# Patient Record
Sex: MALE | Race: WHITE | NOT HISPANIC OR LATINO | Employment: FULL TIME | URBAN - METROPOLITAN AREA
[De-identification: names, ages, dates, MRNs, and addresses within clinical notes are randomized per-mention and may not be internally consistent; named-entity substitution may affect disease eponyms.]

---

## 2017-01-03 ENCOUNTER — PATIENT MESSAGE (OUTPATIENT)
Dept: UROLOGY | Facility: CLINIC | Age: 47
End: 2017-01-03

## 2017-01-04 ENCOUNTER — PATIENT MESSAGE (OUTPATIENT)
Dept: UROLOGY | Facility: CLINIC | Age: 47
End: 2017-01-04

## 2017-01-04 ENCOUNTER — TELEPHONE (OUTPATIENT)
Dept: UROLOGY | Facility: CLINIC | Age: 47
End: 2017-01-04

## 2017-01-10 ENCOUNTER — CLINICAL SUPPORT (OUTPATIENT)
Dept: UROLOGY | Facility: CLINIC | Age: 47
End: 2017-01-10
Payer: COMMERCIAL

## 2017-01-10 ENCOUNTER — TELEPHONE (OUTPATIENT)
Dept: UROLOGY | Facility: CLINIC | Age: 47
End: 2017-01-10

## 2017-01-10 DIAGNOSIS — Z30.2 ENCOUNTER FOR STERILIZATION: Primary | ICD-10-CM

## 2017-01-10 NOTE — PROGRESS NOTES
Dr Tomlin reviewed the pt's semen specimen under the microscope and saw no sperm. Pt is still sterile. Pt will be informed.

## 2017-01-13 ENCOUNTER — OFFICE VISIT (OUTPATIENT)
Dept: OPTOMETRY | Facility: CLINIC | Age: 47
End: 2017-01-13
Payer: COMMERCIAL

## 2017-01-13 DIAGNOSIS — Z01.00 EXAMINATION OF EYES AND VISION: Primary | ICD-10-CM

## 2017-01-13 DIAGNOSIS — H52.7 REFRACTIVE ERROR: ICD-10-CM

## 2017-01-13 PROCEDURE — 99999 PR PBB SHADOW E&M-EST. PATIENT-LVL II: CPT | Mod: PBBFAC,,, | Performed by: OPTOMETRIST

## 2017-01-13 PROCEDURE — 92015 DETERMINE REFRACTIVE STATE: CPT | Mod: S$GLB,,, | Performed by: OPTOMETRIST

## 2017-01-13 PROCEDURE — 92014 COMPRE OPH EXAM EST PT 1/>: CPT | Mod: S$GLB,,, | Performed by: OPTOMETRIST

## 2017-01-13 NOTE — PROGRESS NOTES
HPI     CC: Pt here today yearly eye exam. Pt states over the last year reading   has gotten blurry. Pt has tried using OTC readers but does not see clear   and sharp with the OTC readers.     (-) pain / (-) discomfort  (-) headaches  (-) diplopia   (-) flashes / (-) floaters         Last edited by Heriberto Flores on 1/13/2017  8:05 AM.     ROS     Positive for: Eyes    Negative for: Constitutional, Gastrointestinal, Neurological, Skin,   Genitourinary, Musculoskeletal, HENT, Endocrine, Cardiovascular,   Respiratory, Psychiatric, Allergic/Imm, Heme/Lymph    Last edited by Brenton Alejo, OD on 1/13/2017  8:30 AM. (History)        Assessment /Plan     For exam results, see Encounter Report.    Examination of eyes and vision    Refractive error      Good ocular health OU. Dispensed updated spectacle Rx. Discussed various spectacle lens options, recommend PALs. Discussed adaptation period to new specs.     Discussed good CLs candidacy, will return as desired for first time fit.       RTC in 1 year for comprehensive eye exam, or sooner prn.

## 2017-01-17 ENCOUNTER — PATIENT MESSAGE (OUTPATIENT)
Dept: FAMILY MEDICINE | Facility: CLINIC | Age: 47
End: 2017-01-17

## 2017-01-20 NOTE — TELEPHONE ENCOUNTER
Talked with patient; acute issues were discussed at visit is why additional coding than physical. This is why there is copay. He is not happy, but voices understanding

## 2017-10-12 ENCOUNTER — TELEPHONE (OUTPATIENT)
Dept: NEUROSURGERY | Facility: CLINIC | Age: 47
End: 2017-10-12

## 2017-10-12 NOTE — TELEPHONE ENCOUNTER
Called pt to schedule appt with CATHY Rascon. Date, time, and location confirmed. Pt verbalized understanding.    ----- Message from Catherine Hylton sent at 10/12/2017 10:41 AM CDT -----  Contact: Self  Calling to schedule an appointment for lower back pain and is radiating down both legs.  Please call patient to schedule.

## 2017-11-03 ENCOUNTER — TELEPHONE (OUTPATIENT)
Dept: NEUROSURGERY | Facility: CLINIC | Age: 47
End: 2017-11-03

## 2017-11-29 DIAGNOSIS — M54.50 LOW BACK PAIN RADIATING TO LOWER EXTREMITY: Primary | ICD-10-CM

## 2017-11-29 DIAGNOSIS — M79.606 LOW BACK PAIN RADIATING TO LOWER EXTREMITY: Primary | ICD-10-CM

## 2017-12-05 ENCOUNTER — HOSPITAL ENCOUNTER (OUTPATIENT)
Dept: RADIOLOGY | Facility: HOSPITAL | Age: 47
Discharge: HOME | End: 2017-12-05
Attending: NURSE PRACTITIONER
Payer: COMMERCIAL

## 2017-12-05 DIAGNOSIS — M54.50 LOW BACK PAIN RADIATING TO LOWER EXTREMITY: ICD-10-CM

## 2017-12-05 DIAGNOSIS — M79.606 LOW BACK PAIN RADIATING TO LOWER EXTREMITY: ICD-10-CM

## 2017-12-07 ENCOUNTER — HOSPITAL ENCOUNTER (OUTPATIENT)
Dept: RADIOLOGY | Facility: HOSPITAL | Age: 47
Discharge: HOME | End: 2017-12-07
Attending: NURSE PRACTITIONER
Payer: COMMERCIAL

## 2017-12-07 PROCEDURE — 72148 MRI LUMBAR SPINE W/O DYE: CPT | Mod: TC

## 2017-12-07 PROCEDURE — 72148 MRI LUMBAR SPINE W/O DYE: CPT | Mod: 26,,, | Performed by: RADIOLOGY

## 2017-12-12 ENCOUNTER — TELEPHONE (OUTPATIENT)
Dept: NEUROSURGERY | Facility: CLINIC | Age: 47
End: 2017-12-12

## 2017-12-12 NOTE — TELEPHONE ENCOUNTER
Left voice message with pt regarding request to make appt. Awaiting response. Can see ANDRÉS Randle, unless he can get a referral from MD then we can schedule with Dr. Smith. He has MRI.

## 2017-12-12 NOTE — TELEPHONE ENCOUNTER
Pt called back and stated he will have his MD send a referral to see Dr. Smith. Will call pt once referral is sent.

## 2017-12-14 ENCOUNTER — OFFICE VISIT (OUTPATIENT)
Dept: PAIN MEDICINE | Facility: CLINIC | Age: 47
End: 2017-12-14
Payer: COMMERCIAL

## 2017-12-14 VITALS
HEART RATE: 72 BPM | BODY MASS INDEX: 24.52 KG/M2 | HEIGHT: 73 IN | WEIGHT: 185 LBS | SYSTOLIC BLOOD PRESSURE: 122 MMHG | DIASTOLIC BLOOD PRESSURE: 70 MMHG

## 2017-12-14 DIAGNOSIS — M51.26 LUMBAR DISC HERNIATION: Primary | ICD-10-CM

## 2017-12-14 DIAGNOSIS — M54.16 LUMBAR RADICULITIS: ICD-10-CM

## 2017-12-14 DIAGNOSIS — M54.16 LUMBAR RADICULOPATHY: Primary | ICD-10-CM

## 2017-12-14 PROCEDURE — 99999 PR PBB SHADOW E&M-EST. PATIENT-LVL III: CPT | Mod: PBBFAC,,, | Performed by: ANESTHESIOLOGY

## 2017-12-14 PROCEDURE — 99204 OFFICE O/P NEW MOD 45 MIN: CPT | Mod: S$GLB,,, | Performed by: ANESTHESIOLOGY

## 2017-12-14 NOTE — PROGRESS NOTES
This note was completed with dictation software and grammatical errors may exist.    Referring Physician: No ref. provider found    PCP: Gregory Parmar MD      CC: low back and leg pain    HPI:   Valentin Johnson is a 47 y.o. male presents to us with low back and bilateral leg pain.  Pain has gradually worsened over past 4 months.  No recent traumatic incident.  His constant aching, burning, grabbing pain in his lower back.  Pain radiates down his bilateral legs, right greater than left.  Pain travels down to his right calf.  Pain worsens with standing, bending, lateral rotation.  Pain improves with rest and walking.  He has tried chiropractor therapy with minimal benefit.  MRI of his lumbar spine performed recently shows L5-S1 disc extrusion.  He denies any weakness.  No bowel bladder changes.  He has not had any interventional procedures.  He rates pain 6/10 today.    ROS:  CONSTITUTIONAL: No fevers, chills, night sweats, wt. loss, appetite changes  SKIN: no rashes or itching  ENT: No headaches, head trauma, vision changes, or eye pain  LYMPH NODES: None noticed   CV: No chest pain, palpitations.   RESP: No shortness of breath, dyspnea on exertion, cough, wheezing, or hemoptysis  GI: No nausea, emesis, diarrhea, constipation, melena, hematochezia, pain.    : No dysuria, hematuria, urgency, or frequency   HEME: No easy bruising, bleeding problems  PSYCHIATRIC: No depression, anxiety, psychosis, hallucinations.  NEURO: No seizures, memory loss, dizziness or difficulty sleeping  MSK:  +history of present illness      Past Medical History:   Diagnosis Date    Colon polyp     GERD (gastroesophageal reflux disease)     Giardial enteritis     H. pylori infection     Prostatitis      Past Surgical History:   Procedure Laterality Date    COLONOSCOPY      COLONOSCOPY N/A 12/16/2015    Procedure: COLONOSCOPY;  Surgeon: Anton Matos MD;  Location: [facility];  Service: Endoscopy;  Laterality: N/A;    HERNIA  "REPAIR Right     inguinal    NASAL SEPTUM SURGERY      UPPER GASTROINTESTINAL ENDOSCOPY       Family History   Problem Relation Age of Onset    Hypertension Mother     Hypertension Father     Macular degeneration Father     Migraines Sister     Hypertension Brother     Obesity Brother     Macular degeneration Maternal Grandmother     Glaucoma Neg Hx     Retinal detachment Neg Hx     Strabismus Neg Hx      Social History     Social History    Marital status:      Spouse name: N/A    Number of children: N/A    Years of education: N/A     Occupational History    sports medicine      Social History Main Topics    Smoking status: Never Smoker    Smokeless tobacco: Never Used    Alcohol use Yes      Comment: seldom    Drug use: No    Sexual activity: Not Asked     Other Topics Concern    None     Social History Narrative    None         Medications/Allergies: See med card    Vitals:    12/14/17 0955   BP: 122/70   Pulse: 72   Weight: 83.9 kg (185 lb)   Height: 6' 1" (1.854 m)   PainSc:   4   PainLoc: Back         Physical exam:    GENERAL: A and O x3, the patient appears well groomed and is in no acute distress.  Skin: No rashes or obvious lesions  HEENT: normocephalic, atraumatic  CARDIOVASCULAR:  Palpable peripheral pulses  LUNGS: easy work of breathing  ABDOMEN: soft, nontender   UPPER EXTREMITIES: Normal alignment, normal range of motion, no atrophy, no skin changes,  hair growth and nail growth normal and equal bilaterally. No swelling, no tenderness.    LOWER EXTREMITIES:  Normal alignment, normal range of motion, no atrophy, no skin changes,  hair growth and nail growth normal and equal bilaterally. No swelling, no tenderness.    LUMBAR SPINE  Lumbar spine: ROM is full with flexion extension and oblique extension with moderate increased pain.    Yo's test causes no increased pain on either side.    Supine straight leg raise is positive on the right at 45 degrees  Internal and " external rotation of the hip causes no increased pain on either side.  Myofascial exam: No tenderness to palpation across lumbar paraspinous muscles.      MENTAL STATUS: normal orientation, speech, language, and fund of knowledge for social situation.  Emotional state appropriate.    CRANIAL NERVES:  II:  PERRL bilaterally,   III,IV,VI: EOMI.    V:  Facial sensation equal bilaterally  VII:  Facial motor function normal.  VIII:  Hearing equal to finger rub bilaterally  IX/X: Gag normal, palate symmetric  XI:  Shoulder shrug equal, head turn equal  XII:  Tongue midline without fasciculations      MOTOR: Tone and bulk: normal bilateral upper and lower Strength: normal   Delt Bi Tri WE WF     R 5 5 5 5 5 5   L 5 5 5 5 5 5     IP ADD ABD Quad TA Gas HAM  R 5 5 5 5 5 5 5  L 5 5 5 5 5 5 5    SENSATION: Light touch and pinprick intact bilaterally  REFLEXES: normal, symmetric, nonbrisk.  Toes down, no clonus. No hoffmans.  GAIT: normal rise, base, steps, and arm swing.        Imaging:  MRI L-spine 12/7/2017  Conus terminates at L1. Within the lateral recess at the L5-S1 level and extending cephalad along the posterior aspect of the L5 vertebral body, there is an oval, circumscribed T1/T2 isointense extra dural mass measuring 1.4 x 0.8 x 1.0 cm (craniocaudal by AP by transverse). This abuts the superior margin of the L5-S1 disc. There is moderate broad-based posterior bulging of L5-S1 disc and the findings may represent migrated disc material in the setting of a disc extrusion.    L1-L2:  No disc herniation. No spinal canal or neuroforaminal narrowing.    L2-L3:  Mild generalized disc bulging and facet arthropathy are present resulting in mild left neuroforaminal narrowing.    L3-L4:  Bilateral facet arthropathy contributes to mild bilateral neuroforaminal narrowing.    L4-L5:  No disc herniation. Moderate bilateral facet arthropathy is present, contributing to mild bilateral neuroforaminal narrowing.    L5-S1:  Bilateral  L5 pars interarticularis defects are present without associated subluxation. The above described moderate broad-based posterior disc bulge along with the right lateral recess/right paracentral extradural mass are clear there is also a 1.2 x 1.2 x 0.5 cm extradural cystic lesion within the left lateral aspect of the spinal canal, abutting the left facet joint, compatible with a synovial cyst. There is also moderate bilateral facet arthropathy. These findings contribute to moderate bilateral neuroforaminal narrowing and mild spinal canal narrowing.    Assessment:  Patient presents with low back and bilateral leg pain  1. Lumbar disc herniation    2. Lumbar radiculitis          Plan:  - I have stressed the importance of physical activity and exercise to improve overall health  - I think that the patient's back pain and radicular leg symptoms are due to disc extrusion and have recommended a lumbar epidural steroid injection to the Bilateral L5-S1 level.   - May consider neurosurgery referral if above is not helpful  - Follow up after procedure        Thank you for referring this interesting patient, and I look forward to continuing to collaborate in his care.

## 2017-12-19 ENCOUNTER — SURGERY (OUTPATIENT)
Age: 47
End: 2017-12-19

## 2017-12-19 ENCOUNTER — HOSPITAL ENCOUNTER (OUTPATIENT)
Facility: AMBULARY SURGERY CENTER | Age: 47
Discharge: HOME | End: 2017-12-19
Attending: ANESTHESIOLOGY | Admitting: ANESTHESIOLOGY
Payer: COMMERCIAL

## 2017-12-19 DIAGNOSIS — M54.16 LUMBAR RADICULITIS: Primary | ICD-10-CM

## 2017-12-19 PROCEDURE — 64483 NJX AA&/STRD TFRM EPI L/S 1: CPT | Mod: 50,,, | Performed by: ANESTHESIOLOGY

## 2017-12-19 PROCEDURE — 64483 NJX AA&/STRD TFRM EPI L/S 1: CPT | Mod: RT | Performed by: ANESTHESIOLOGY

## 2017-12-19 RX ORDER — LIDOCAINE HYDROCHLORIDE 10 MG/ML
INJECTION, SOLUTION EPIDURAL; INFILTRATION; INTRACAUDAL; PERINEURAL
Status: DISCONTINUED
Start: 2017-12-19 | End: 2017-12-19 | Stop reason: HOSPADM

## 2017-12-19 RX ORDER — LIDOCAINE HYDROCHLORIDE 10 MG/ML
INJECTION, SOLUTION EPIDURAL; INFILTRATION; INTRACAUDAL; PERINEURAL
Status: DISCONTINUED | OUTPATIENT
Start: 2017-12-19 | End: 2017-12-19 | Stop reason: HOSPADM

## 2017-12-19 RX ORDER — BUPIVACAINE HYDROCHLORIDE 2.5 MG/ML
INJECTION, SOLUTION EPIDURAL; INFILTRATION; INTRACAUDAL
Status: DISCONTINUED | OUTPATIENT
Start: 2017-12-19 | End: 2017-12-19 | Stop reason: HOSPADM

## 2017-12-19 RX ORDER — SODIUM CHLORIDE, SODIUM LACTATE, POTASSIUM CHLORIDE, CALCIUM CHLORIDE 600; 310; 30; 20 MG/100ML; MG/100ML; MG/100ML; MG/100ML
INJECTION, SOLUTION INTRAVENOUS ONCE AS NEEDED
Status: DISCONTINUED | OUTPATIENT
Start: 2017-12-19 | End: 2017-12-19 | Stop reason: HOSPADM

## 2017-12-19 RX ORDER — DEXAMETHASONE SODIUM PHOSPHATE 10 MG/ML
INJECTION INTRAMUSCULAR; INTRAVENOUS
Status: DISCONTINUED | OUTPATIENT
Start: 2017-12-19 | End: 2017-12-19 | Stop reason: HOSPADM

## 2017-12-19 RX ORDER — DEXAMETHASONE SODIUM PHOSPHATE 10 MG/ML
INJECTION INTRAMUSCULAR; INTRAVENOUS
Status: DISCONTINUED
Start: 2017-12-19 | End: 2017-12-19 | Stop reason: HOSPADM

## 2017-12-19 RX ORDER — BUPIVACAINE HYDROCHLORIDE 2.5 MG/ML
INJECTION, SOLUTION EPIDURAL; INFILTRATION; INTRACAUDAL
Status: DISCONTINUED
Start: 2017-12-19 | End: 2017-12-19 | Stop reason: HOSPADM

## 2017-12-19 RX ADMIN — LIDOCAINE HYDROCHLORIDE 5 ML: 10 INJECTION, SOLUTION EPIDURAL; INFILTRATION; INTRACAUDAL; PERINEURAL at 01:12

## 2017-12-19 RX ADMIN — DEXAMETHASONE SODIUM PHOSPHATE 10 MG: 10 INJECTION INTRAMUSCULAR; INTRAVENOUS at 01:12

## 2017-12-19 RX ADMIN — BUPIVACAINE HYDROCHLORIDE 3 ML: 2.5 INJECTION, SOLUTION EPIDURAL; INFILTRATION; INTRACAUDAL at 01:12

## 2017-12-19 NOTE — PLAN OF CARE
Patient denies he has pain, nausea or any weakness. Able to transfer to chair independently. States he is ready to drive self home.

## 2017-12-19 NOTE — H&P (VIEW-ONLY)
This note was completed with dictation software and grammatical errors may exist.    Referring Physician: No ref. provider found    PCP: Gregory Parmar MD      CC: low back and leg pain    HPI:   Valentin Johnson is a 47 y.o. male presents to us with low back and bilateral leg pain.  Pain has gradually worsened over past 4 months.  No recent traumatic incident.  His constant aching, burning, grabbing pain in his lower back.  Pain radiates down his bilateral legs, right greater than left.  Pain travels down to his right calf.  Pain worsens with standing, bending, lateral rotation.  Pain improves with rest and walking.  He has tried chiropractor therapy with minimal benefit.  MRI of his lumbar spine performed recently shows L5-S1 disc extrusion.  He denies any weakness.  No bowel bladder changes.  He has not had any interventional procedures.  He rates pain 6/10 today.    ROS:  CONSTITUTIONAL: No fevers, chills, night sweats, wt. loss, appetite changes  SKIN: no rashes or itching  ENT: No headaches, head trauma, vision changes, or eye pain  LYMPH NODES: None noticed   CV: No chest pain, palpitations.   RESP: No shortness of breath, dyspnea on exertion, cough, wheezing, or hemoptysis  GI: No nausea, emesis, diarrhea, constipation, melena, hematochezia, pain.    : No dysuria, hematuria, urgency, or frequency   HEME: No easy bruising, bleeding problems  PSYCHIATRIC: No depression, anxiety, psychosis, hallucinations.  NEURO: No seizures, memory loss, dizziness or difficulty sleeping  MSK:  +history of present illness      Past Medical History:   Diagnosis Date    Colon polyp     GERD (gastroesophageal reflux disease)     Giardial enteritis     H. pylori infection     Prostatitis      Past Surgical History:   Procedure Laterality Date    COLONOSCOPY      COLONOSCOPY N/A 12/16/2015    Procedure: COLONOSCOPY;  Surgeon: Anton Matos MD;  Location: [facility];  Service: Endoscopy;  Laterality: N/A;    HERNIA  "REPAIR Right     inguinal    NASAL SEPTUM SURGERY      UPPER GASTROINTESTINAL ENDOSCOPY       Family History   Problem Relation Age of Onset    Hypertension Mother     Hypertension Father     Macular degeneration Father     Migraines Sister     Hypertension Brother     Obesity Brother     Macular degeneration Maternal Grandmother     Glaucoma Neg Hx     Retinal detachment Neg Hx     Strabismus Neg Hx      Social History     Social History    Marital status:      Spouse name: N/A    Number of children: N/A    Years of education: N/A     Occupational History    sports medicine      Social History Main Topics    Smoking status: Never Smoker    Smokeless tobacco: Never Used    Alcohol use Yes      Comment: seldom    Drug use: No    Sexual activity: Not Asked     Other Topics Concern    None     Social History Narrative    None         Medications/Allergies: See med card    Vitals:    12/14/17 0955   BP: 122/70   Pulse: 72   Weight: 83.9 kg (185 lb)   Height: 6' 1" (1.854 m)   PainSc:   4   PainLoc: Back         Physical exam:    GENERAL: A and O x3, the patient appears well groomed and is in no acute distress.  Skin: No rashes or obvious lesions  HEENT: normocephalic, atraumatic  CARDIOVASCULAR:  Palpable peripheral pulses  LUNGS: easy work of breathing  ABDOMEN: soft, nontender   UPPER EXTREMITIES: Normal alignment, normal range of motion, no atrophy, no skin changes,  hair growth and nail growth normal and equal bilaterally. No swelling, no tenderness.    LOWER EXTREMITIES:  Normal alignment, normal range of motion, no atrophy, no skin changes,  hair growth and nail growth normal and equal bilaterally. No swelling, no tenderness.    LUMBAR SPINE  Lumbar spine: ROM is full with flexion extension and oblique extension with moderate increased pain.    Yo's test causes no increased pain on either side.    Supine straight leg raise is positive on the right at 45 degrees  Internal and " external rotation of the hip causes no increased pain on either side.  Myofascial exam: No tenderness to palpation across lumbar paraspinous muscles.      MENTAL STATUS: normal orientation, speech, language, and fund of knowledge for social situation.  Emotional state appropriate.    CRANIAL NERVES:  II:  PERRL bilaterally,   III,IV,VI: EOMI.    V:  Facial sensation equal bilaterally  VII:  Facial motor function normal.  VIII:  Hearing equal to finger rub bilaterally  IX/X: Gag normal, palate symmetric  XI:  Shoulder shrug equal, head turn equal  XII:  Tongue midline without fasciculations      MOTOR: Tone and bulk: normal bilateral upper and lower Strength: normal   Delt Bi Tri WE WF     R 5 5 5 5 5 5   L 5 5 5 5 5 5     IP ADD ABD Quad TA Gas HAM  R 5 5 5 5 5 5 5  L 5 5 5 5 5 5 5    SENSATION: Light touch and pinprick intact bilaterally  REFLEXES: normal, symmetric, nonbrisk.  Toes down, no clonus. No hoffmans.  GAIT: normal rise, base, steps, and arm swing.        Imaging:  MRI L-spine 12/7/2017  Conus terminates at L1. Within the lateral recess at the L5-S1 level and extending cephalad along the posterior aspect of the L5 vertebral body, there is an oval, circumscribed T1/T2 isointense extra dural mass measuring 1.4 x 0.8 x 1.0 cm (craniocaudal by AP by transverse). This abuts the superior margin of the L5-S1 disc. There is moderate broad-based posterior bulging of L5-S1 disc and the findings may represent migrated disc material in the setting of a disc extrusion.    L1-L2:  No disc herniation. No spinal canal or neuroforaminal narrowing.    L2-L3:  Mild generalized disc bulging and facet arthropathy are present resulting in mild left neuroforaminal narrowing.    L3-L4:  Bilateral facet arthropathy contributes to mild bilateral neuroforaminal narrowing.    L4-L5:  No disc herniation. Moderate bilateral facet arthropathy is present, contributing to mild bilateral neuroforaminal narrowing.    L5-S1:  Bilateral  L5 pars interarticularis defects are present without associated subluxation. The above described moderate broad-based posterior disc bulge along with the right lateral recess/right paracentral extradural mass are clear there is also a 1.2 x 1.2 x 0.5 cm extradural cystic lesion within the left lateral aspect of the spinal canal, abutting the left facet joint, compatible with a synovial cyst. There is also moderate bilateral facet arthropathy. These findings contribute to moderate bilateral neuroforaminal narrowing and mild spinal canal narrowing.    Assessment:  Patient presents with low back and bilateral leg pain  1. Lumbar disc herniation    2. Lumbar radiculitis          Plan:  - I have stressed the importance of physical activity and exercise to improve overall health  - I think that the patient's back pain and radicular leg symptoms are due to disc extrusion and have recommended a lumbar epidural steroid injection to the Bilateral L5-S1 level.   - May consider neurosurgery referral if above is not helpful  - Follow up after procedure        Thank you for referring this interesting patient, and I look forward to continuing to collaborate in his care.

## 2017-12-19 NOTE — DISCHARGE SUMMARY
[facility]  Discharge Note  Short Stay    Admit Date: 12/19/2017    Discharge Date and Time: 12/19/2017    Attending Physician: Servando Jo MD     Discharge Provider: Servando Jo    Diagnoses:  Active Hospital Problems    Diagnosis  POA    *Lumbar radiculitis [M54.16]  Yes      Resolved Hospital Problems    Diagnosis Date Resolved POA   No resolved problems to display.       Hospital Course: Lumbar MARIAMA  Discharged Condition: Good    Final Diagnoses:   Active Hospital Problems    Diagnosis  POA    *Lumbar radiculitis [M54.16]  Yes      Resolved Hospital Problems    Diagnosis Date Resolved POA   No resolved problems to display.       Disposition: Home or Self Care    Follow up/Patient Instructions:    Medications:  Reconciled Home Medications:   Current Discharge Medication List      CONTINUE these medications which have NOT CHANGED    Details   azelastine (ASTELIN) 137 mcg (0.1 %) nasal spray 2 sprays (274 mcg total) by Nasal route once daily. May use once to twice per day depending on severity of symptoms  Qty: 30 mL, Refills: 12    Associated Diagnoses: Right acute serous otitis media, recurrence not specified      B INFANTIS/B ANI/B SASHA/B BIFID (PROBIOTIC 4X ORAL) Take by mouth once daily.      multivitamin (THERAGRAN) per tablet Take 1 tablet by mouth once daily.             Discharge Procedure Orders  Diet general     Activity as tolerated     Call MD for:  temperature >100.4     Call MD for:  persistent nausea and vomiting or diarrhea     Call MD for:  severe uncontrolled pain     Call MD for:  redness, tenderness, or signs of infection (pain, swelling, redness, odor or green/yellow discharge around incision site)     Call MD for:  difficulty breathing or increased cough     Call MD for:  severe persistent headache          Follow up with MD in 2-3 weeks    Discharge Procedure Orders (must include Diet, Follow-up, Activity):    Discharge Procedure Orders (must include Diet, Follow-up, Activity)  Diet  general     Activity as tolerated     Call MD for:  temperature >100.4     Call MD for:  persistent nausea and vomiting or diarrhea     Call MD for:  severe uncontrolled pain     Call MD for:  redness, tenderness, or signs of infection (pain, swelling, redness, odor or green/yellow discharge around incision site)     Call MD for:  difficulty breathing or increased cough     Call MD for:  severe persistent headache

## 2017-12-19 NOTE — DISCHARGE INSTRUCTIONS
Recovery After Procedural Sedation (Adult)  You have been given medicine by vein to make you sleep during your surgery. This may have included both a pain medicine and sleeping medicine. Most of the effects have worn off. But you may still have some drowsiness for the next 6 to 8 hours.  Home care  Follow these guidelines when you get home:  · For the next 8 hours, you should be watched by a responsible adult. This person should make sure your condition is not getting worse.  · Don't drink any alcohol for the next 24 hours or when taking narcotic pain medications.  · Don't drive, operate dangerous machinery, or make important business or personal decisions during the next 24 hours.  Note: Your healthcare provider may tell you not to take any medicine by mouth for pain or sleep in the next 4 hours. These medicines may react with the medicines you were given in the hospital. This could cause a much stronger response than usual.  Follow-up care  Follow up with your healthcare provider if you are not alert and back to your usual level of activity within 12 hours.  When to seek medical advice  Call your healthcare provider right away if any of these occur:  · Drowsiness gets worse  · Weakness or dizziness gets worse  · Repeated vomiting  · You can't be awakened   Date Last Reviewed: 10/18/2016  © 6508-5616 [employer]. [address] [place] [address], [place] [address]. All rights reserved. This information is not intended as a substitute for professional medical care. Always follow your healthcare professional's instructions.      Pain injection instructions:     Steroids take about a week to relieve pain.  Initially you may get pain relief from the local anesthetic but this may wear off before the steroid works.    No driving for 24 hrs   Activity as tolerated- gradually increase activities.  Dont lift over 10 lbs for 24 hrs   No heat at injection sites x 2 days. No heating pads, hot tubs, saunas or swimming  pools for 2 days  Use ice pack( given)  for mild swelling and for comfort at 20 minute intervals, 20 minutes on 20 minutes off.  May shower today. No  Tub baths for two days.      Resume Aspirin, Plavix, or Coumadin the day after the procedure unless otherwise instructed.   If diabetic,monitor your glucose carefully as steroids can increase glucose level    Seek immediate medical help for:   Severe increase in your usual pain or appearance of new pain.  Prolonged or increasing weakness or numbness in the legs or arms.    - Numbing medicine was injected that affects nerves that carry information from  The muscles to brain and the brain to the muscles.  This numbness can last 4-6 hrs so be very careful  get assistance when standing or walking.    Fever above 101 ,Drainage,redness,active bleeding, or increased swelling at the injection site.  Headache, shortness of breath, chest pain, or breathing problems.

## 2017-12-19 NOTE — OP NOTE
PROCEDURE DATE: 12/19/2017    PROCEDURE: Bilateral L5-S1 transforaminal epidural steroid injection under fluoroscopy    DIAGNOSIS: Lumbar disc displacement without myelopathy  Post op diagnosis: Same    PHYSICIAN: Servando Jo MD    MEDICATIONS INJECTED:  Dexamethasone 5mg (0.5ml) and 1.5ml 0.25% bupivicaine at each nerve root.     LOCAL ANESTHETIC INJECTED:  Lidocaine 1%. 2 ml per site.    SEDATION MEDICATIONS: None    ESTIMATED BLOOD LOSS:  None    COMPLICATIONS:  None    TECHNIQUE:   A time-out was taken to identify patient and procedure side prior to starting the procedure. The patient was placed in a prone position, prepped and draped in the usual sterile fashion using ChloraPrep and sterile towels.  The area to be injected was determined under fluoroscopic guidance in AP and oblique view.  Local anesthetic was given by raising a wheal and going down to the hub of a 25-gauge 1.5 inch needle.  In oblique view, a 3.5 inch 22-gauge bent-tip spinal needle was introduced towards 6 oclock position of the pedicle of each above named nerve root level.  The needle was walked medially then hinged into the neural foramen and position was confirmed in AP and lateral views.  1ml contrast dye was injected to confirm appropriate placement and that there was no vascular uptake.  After negative aspiration for blood or CSF, the medication was then injected. This was performed at the bilateral L5-S1 level(s). The patient tolerated the procedure well.    The patient was monitored after the procedure.  Patient was given post procedure and discharge instructions to follow at home. The patient was discharged in a stable condition.

## 2017-12-22 VITALS
WEIGHT: 185 LBS | RESPIRATION RATE: 16 BRPM | DIASTOLIC BLOOD PRESSURE: 89 MMHG | HEART RATE: 68 BPM | TEMPERATURE: 98 F | SYSTOLIC BLOOD PRESSURE: 135 MMHG | OXYGEN SATURATION: 98 % | HEIGHT: 73 IN | BODY MASS INDEX: 24.52 KG/M2

## 2018-01-02 ENCOUNTER — PATIENT MESSAGE (OUTPATIENT)
Dept: PAIN MEDICINE | Facility: CLINIC | Age: 48
End: 2018-01-02

## 2018-01-02 RX ORDER — BACLOFEN 10 MG/1
10 TABLET ORAL 3 TIMES DAILY
Qty: 90 TABLET | Refills: 1 | Status: SHIPPED | OUTPATIENT
Start: 2018-01-02 | End: 2020-07-21

## 2018-01-09 ENCOUNTER — DOCUMENTATION ONLY (OUTPATIENT)
Dept: FAMILY MEDICINE | Facility: CLINIC | Age: 48
End: 2018-01-09

## 2018-01-09 NOTE — PROGRESS NOTES
Pre-Visit Chart Review  For Appointment Scheduled on 1/12/18.    Health Maintenance Due   Topic Date Due    Influenza Vaccine  08/01/2017

## 2018-01-12 ENCOUNTER — LAB VISIT (OUTPATIENT)
Dept: LAB | Facility: HOSPITAL | Age: 48
End: 2018-01-12
Attending: FAMILY MEDICINE
Payer: COMMERCIAL

## 2018-01-12 ENCOUNTER — OFFICE VISIT (OUTPATIENT)
Dept: FAMILY MEDICINE | Facility: CLINIC | Age: 48
End: 2018-01-12
Payer: COMMERCIAL

## 2018-01-12 ENCOUNTER — TELEPHONE (OUTPATIENT)
Dept: FAMILY MEDICINE | Facility: CLINIC | Age: 48
End: 2018-01-12

## 2018-01-12 VITALS
BODY MASS INDEX: 24.69 KG/M2 | WEIGHT: 186.31 LBS | SYSTOLIC BLOOD PRESSURE: 128 MMHG | HEART RATE: 75 BPM | HEIGHT: 73 IN | DIASTOLIC BLOOD PRESSURE: 73 MMHG | TEMPERATURE: 98 F

## 2018-01-12 DIAGNOSIS — R35.0 INCREASED URINARY FREQUENCY: ICD-10-CM

## 2018-01-12 DIAGNOSIS — Z00.00 ROUTINE MEDICAL EXAM: ICD-10-CM

## 2018-01-12 DIAGNOSIS — R35.1 NOCTURIA: ICD-10-CM

## 2018-01-12 DIAGNOSIS — M51.9 LUMBAR DISC DISEASE: ICD-10-CM

## 2018-01-12 DIAGNOSIS — M54.16 LUMBAR RADICULOPATHY: ICD-10-CM

## 2018-01-12 DIAGNOSIS — Z00.00 ROUTINE MEDICAL EXAM: Primary | ICD-10-CM

## 2018-01-12 LAB
ALBUMIN SERPL BCP-MCNC: 4.2 G/DL
ALP SERPL-CCNC: 69 U/L
ALT SERPL W/O P-5'-P-CCNC: 14 U/L
ANION GAP SERPL CALC-SCNC: 7 MMOL/L
AST SERPL-CCNC: 17 U/L
BASOPHILS # BLD AUTO: 0.02 K/UL
BASOPHILS NFR BLD: 0.4 %
BILIRUB SERPL-MCNC: 0.8 MG/DL
BUN SERPL-MCNC: 16 MG/DL
CALCIUM SERPL-MCNC: 9.8 MG/DL
CHLORIDE SERPL-SCNC: 101 MMOL/L
CHOLEST SERPL-MCNC: 141 MG/DL
CHOLEST/HDLC SERPL: 3.1 {RATIO}
CO2 SERPL-SCNC: 30 MMOL/L
COMPLEXED PSA SERPL-MCNC: 0.94 NG/ML
CREAT SERPL-MCNC: 1.2 MG/DL
DIFFERENTIAL METHOD: NORMAL
EOSINOPHIL # BLD AUTO: 0.1 K/UL
EOSINOPHIL NFR BLD: 2.9 %
ERYTHROCYTE [DISTWIDTH] IN BLOOD BY AUTOMATED COUNT: 13.4 %
EST. GFR  (AFRICAN AMERICAN): >60 ML/MIN/1.73 M^2
EST. GFR  (NON AFRICAN AMERICAN): >60 ML/MIN/1.73 M^2
GLUCOSE SERPL-MCNC: 96 MG/DL
HCT VFR BLD AUTO: 45.2 %
HDLC SERPL-MCNC: 46 MG/DL
HDLC SERPL: 32.6 %
HGB BLD-MCNC: 14.9 G/DL
IMM GRANULOCYTES # BLD AUTO: 0.02 K/UL
IMM GRANULOCYTES NFR BLD AUTO: 0.4 %
LDLC SERPL CALC-MCNC: 77.2 MG/DL
LYMPHOCYTES # BLD AUTO: 1.4 K/UL
LYMPHOCYTES NFR BLD: 28.5 %
MCH RBC QN AUTO: 29.3 PG
MCHC RBC AUTO-ENTMCNC: 33 G/DL
MCV RBC AUTO: 89 FL
MONOCYTES # BLD AUTO: 0.4 K/UL
MONOCYTES NFR BLD: 7.2 %
NEUTROPHILS # BLD AUTO: 2.9 K/UL
NEUTROPHILS NFR BLD: 60.6 %
NONHDLC SERPL-MCNC: 95 MG/DL
NRBC BLD-RTO: 0 /100 WBC
PLATELET # BLD AUTO: 317 K/UL
PMV BLD AUTO: 9.8 FL
POTASSIUM SERPL-SCNC: 4.4 MMOL/L
PROT SERPL-MCNC: 7.6 G/DL
RBC # BLD AUTO: 5.09 M/UL
SODIUM SERPL-SCNC: 138 MMOL/L
TRIGL SERPL-MCNC: 89 MG/DL
TSH SERPL DL<=0.005 MIU/L-ACNC: 1.1 UIU/ML
WBC # BLD AUTO: 4.85 K/UL

## 2018-01-12 PROCEDURE — 99396 PREV VISIT EST AGE 40-64: CPT | Mod: S$GLB,,, | Performed by: FAMILY MEDICINE

## 2018-01-12 PROCEDURE — 36415 COLL VENOUS BLD VENIPUNCTURE: CPT | Mod: PO

## 2018-01-12 PROCEDURE — 84153 ASSAY OF PSA TOTAL: CPT

## 2018-01-12 PROCEDURE — 84443 ASSAY THYROID STIM HORMONE: CPT

## 2018-01-12 PROCEDURE — 85025 COMPLETE CBC W/AUTO DIFF WBC: CPT

## 2018-01-12 PROCEDURE — 99999 PR PBB SHADOW E&M-EST. PATIENT-LVL IV: CPT | Mod: PBBFAC,,, | Performed by: FAMILY MEDICINE

## 2018-01-12 PROCEDURE — 80053 COMPREHEN METABOLIC PANEL: CPT

## 2018-01-12 PROCEDURE — 80061 LIPID PANEL: CPT

## 2018-01-12 NOTE — TELEPHONE ENCOUNTER
Spoke with pt and confirmed appt. Date, time, and location verified. Pt verbalized understanding.

## 2018-01-15 ENCOUNTER — TELEPHONE (OUTPATIENT)
Dept: FAMILY MEDICINE | Facility: CLINIC | Age: 48
End: 2018-01-15

## 2018-01-15 DIAGNOSIS — R35.0 INCREASED URINARY FREQUENCY: ICD-10-CM

## 2018-01-15 DIAGNOSIS — R35.1 NOCTURIA: Primary | ICD-10-CM

## 2018-01-15 NOTE — TELEPHONE ENCOUNTER
UA and culture is negative. Am referring to urology for evaluation of his nocturia, increased urinary frequency

## 2018-01-16 ENCOUNTER — OFFICE VISIT (OUTPATIENT)
Dept: UROLOGY | Facility: CLINIC | Age: 48
End: 2018-01-16
Payer: COMMERCIAL

## 2018-01-16 VITALS
TEMPERATURE: 98 F | HEART RATE: 87 BPM | BODY MASS INDEX: 24.66 KG/M2 | SYSTOLIC BLOOD PRESSURE: 120 MMHG | HEIGHT: 73 IN | WEIGHT: 186.06 LBS | DIASTOLIC BLOOD PRESSURE: 81 MMHG | RESPIRATION RATE: 16 BRPM

## 2018-01-16 DIAGNOSIS — R35.0 URINARY FREQUENCY: ICD-10-CM

## 2018-01-16 DIAGNOSIS — R39.198 DECREASED URINE STREAM: Primary | ICD-10-CM

## 2018-01-16 DIAGNOSIS — R35.1 NOCTURIA: ICD-10-CM

## 2018-01-16 PROCEDURE — 99999 PR PBB SHADOW E&M-EST. PATIENT-LVL III: CPT | Mod: PBBFAC,,, | Performed by: NURSE PRACTITIONER

## 2018-01-16 PROCEDURE — 99214 OFFICE O/P EST MOD 30 MIN: CPT | Mod: S$GLB,,, | Performed by: NURSE PRACTITIONER

## 2018-01-16 PROCEDURE — 51798 US URINE CAPACITY MEASURE: CPT | Mod: S$GLB,,, | Performed by: NURSE PRACTITIONER

## 2018-01-16 NOTE — PROGRESS NOTES
"[facility] Note  Staff: CHARMAINE Balderas    Referring provider and please cc:   PCP: Dr. Gregory Parmar    Chief Complaint: Nocturia and urinary frequency, weak urine stream.    Subjective:        HPI: Valentin Johnson is a 47 y.o. male presents with complaints of increased frequency in urination and nocturia symptoms for "awhile now".  The reason for evaluation today is recently he noticed now his urine stream weaken.    The patient was last seen by Dr. Tomlin on 12/16/2015 for same symptoms and was treated at that time for probable prostatitis which pt does not remember today at that time whether his symptoms resolved after regimen of antibiotics.    *Pt does travel (since 2012) to [place], tx every 2-3 weeks to see his wife.  He tries to hold urine until completion of trip without stopping in between driving.  Back issues in past which are improving.    Nocturia 3-4x on worst nights.  On his best night he goes 1-2x at night.  Drinks a lot of water during the daytime.  Weak urine flow/stream    Family history:  Father recently diagnosed and treated for prostate cancer at 71 years old.    Last PSA Screening:   Lab Results   Component Value Date    PSADIAG 0.94 01/12/2018     REVIEW OF SYSTEMS:  Review of Systems   Constitutional: Negative for chills, diaphoresis, fever and weight loss.   HENT: Negative for congestion, hearing loss, nosebleeds and sore throat.    Eyes: Negative for blurred vision and pain.   Respiratory: Negative for cough and wheezing.    Cardiovascular: Negative for chest pain, palpitations and leg swelling.   Gastrointestinal: Negative for abdominal pain, heartburn, nausea and vomiting.   Genitourinary: Positive for frequency. Negative for dysuria, flank pain, hematuria and urgency.        +Nocturia  +Weak urine stream   Musculoskeletal: Negative for back pain, joint pain, myalgias and neck pain.   Skin: Negative for itching and rash.   Neurological: Negative for " dizziness, tremors, sensory change, seizures, loss of consciousness, weakness and headaches.   Endo/Heme/Allergies: Does not bruise/bleed easily.   Psychiatric/Behavioral: Negative for depression and suicidal ideas. The patient is not nervous/anxious.      Physical Exam    PMHx:  Past Medical History:   Diagnosis Date    Colon polyp     GERD (gastroesophageal reflux disease)     Giardial enteritis     H. pylori infection 2015    Prostatitis      Kidney stones: No  Cataracts? None    PSHx:  Past Surgical History:   Procedure Laterality Date    COLONOSCOPY N/A 12/16/2015    Procedure: COLONOSCOPY;  Surgeon: Anton Matos MD;  Location: [facility];  Service: Endoscopy;  Laterality: N/A;    HERNIA REPAIR Right     inguinal    NASAL SEPTUM SURGERY      deviated septum, chronic sinusitis    UPPER GASTROINTESTINAL ENDOSCOPY       Fam Hx:   malignancies: Yes - Father was recently diagnosed with prostate cancer.    kidney stones: No     Soc Hx:  No tobacco use  2 cups of coffee daily, remainder of day is water, soda can once per week.  *Pt does travel (since 2012) to [place], tx every 2-3 weeks to see his wife.  He tries to hold urine until completion of trip without stopping.    Allergies:  Penicillins    Medications: reviewed   Anticoagulation: No    Objective:     Vitals:    01/16/18 1105   BP: 120/81   Pulse: 87   Resp: 16   Temp: 98.2 °F (36.8 °C)     General:WDWN in NAD  Eyes: PERRLA, normal conjunctiva  Respiratory: no increased work on breathing, clear to auscultation  Cardiovascular: regular rate and rhythm. No obvious extremity edema.  GI: palpation of masses. No tenderness. No hepatosplenomegaly to palpation.  Musculoskeletal: normal range of motion of bilateral upper extremities. Normal muscle strength and tone.  Skin: no obvious rashes or lesions. No tightening of skin noted.  Neurologic: CN grossly normal. Normal sensation.   Psychiatric: awake, alert and oriented x 3. Mood and affect normal.  Cooperative.    :  Inspection of anus and perineum normal  No scrotal rashes, cysts or lesions  Epididymis normal in size, no tenderness  Testes normal and size, equal size bilaterally, no masses  Urethral meatus normal without discharge  Penis is circumcised,   NGUYEN: 25g gland without masses, tenderness. SV not palpable. Normal sphincter tone. No bilateral inguinal hernias noted     PVR by bladder scan performed by me today: 0 ML*    LABS REVIEW:  UA today:  Color:Clear, Yellow  Spec. Grav.  1.005  PH  7.0  Negative for leukocytes, nitrates, protein, glucose, ketones, urobili, bili, and blood.    UCx:   Results for orders placed or performed in visit on 01/12/18   Urine culture   Result Value Ref Range    Urine Culture, Routine No growth      Cr:   Lab Results   Component Value Date    CREATININE 1.2 01/12/2018     Assessment:       1. Decreased urine stream    2. Nocturia    3. Urinary frequency          Plan:   Prostate vs. Bladder issues vs. Interstitial cystitis symptoms with nocturia and frequency x 2-3 years:    -Due to pt chronically travelling long distances every 2-3 weeks--I have discussed with pt today about the importance of not holding his urine and training his bladder as appropriate and stopping to urinate when traveling when he gets the urge to urinate.      -Pt will f/up with one of Urologists for further evaluation.  Prior to ov with MD, pt will have a Uroflow study done.  He may need cysto in future.    [facility]: Active    Eula Oneil, CHARMAINE

## 2018-01-16 NOTE — LETTER
January 16, 2018      Rossi Gutierrez MD  [address]  [place] [address]           [place] [facility]  [address] [place] [address] [address]  [place] [address]  Phone: [phone]  Fax: [phone]          Patient: Valentin Johnson   MR Number: [id number]   YOB: 1970   Date of Visit: 1/16/2018       Dear Dr. Rossi Gutierrez:    Thank you for referring Valentin Johnson to me for evaluation. Attached you will find relevant portions of my assessment and plan of care.    If you have questions, please do not hesitate to call me. I look forward to following Valentin Johnson along with you.    Sincerely,    Eula Oneil, [facility]    Enclosure  CC:  No Recipients    If you would like to receive this communication electronically, please contact [email or web address] or [phone] to request more information on [employer] Link access.    For providers and/or their staff who would like to refer a patient to [facility], please contact us through our one-stop-shop provider referral line, [facility] , at [phone].    If you feel you have received this communication in error or would no longer like to receive these types of communications, please e-mail [email or web address]

## 2018-01-22 ENCOUNTER — INITIAL CONSULT (OUTPATIENT)
Dept: NEUROSURGERY | Facility: CLINIC | Age: 48
End: 2018-01-22
Payer: COMMERCIAL

## 2018-01-22 VITALS
BODY MASS INDEX: 24.73 KG/M2 | DIASTOLIC BLOOD PRESSURE: 72 MMHG | WEIGHT: 186.63 LBS | HEART RATE: 81 BPM | SYSTOLIC BLOOD PRESSURE: 99 MMHG | HEIGHT: 73 IN

## 2018-01-22 DIAGNOSIS — M51.27 LUMBOSACRAL DISC HERNIATION: ICD-10-CM

## 2018-01-22 DIAGNOSIS — G95.9 MYELOPATHY: ICD-10-CM

## 2018-01-22 DIAGNOSIS — M43.10 PARS DEFECT WITH SPONDYLOLISTHESIS: ICD-10-CM

## 2018-01-22 PROCEDURE — 99204 OFFICE O/P NEW MOD 45 MIN: CPT | Mod: S$GLB,,, | Performed by: NEUROLOGICAL SURGERY

## 2018-01-22 PROCEDURE — 99999 PR PBB SHADOW E&M-EST. PATIENT-LVL III: CPT | Mod: PBBFAC,,, | Performed by: NEUROLOGICAL SURGERY

## 2018-01-22 NOTE — PROGRESS NOTES
CHIEF COMPLAINT:  Routine medical exam      HISTORY OF PRESENT ILLNESS:  Valentin Johnson is a 47 y.o. male patient of Dr. Parmar who presents to clinic for a routine medical exam.  He also has noticed increased urinary frequency, urgency, nocturia and decreased urinary stream.  He has lumbar disc disease and radiculopathy and requests referral to neurosurgery.  He is up to date on his immunizations. He follows up with his dentist. He tries to eat a healthy diet.       REVIEW OF SYSTEMS:  The patient denies any fever, chills, night sweats, headaches, vision changes, difficulty speaking or swallowing, decreased hearing, weight loss, weight gain, chest pain, palpitations, shortness of breath, cough, nausea, vomiting, abdominal pain, dysuria, diarrhea, constipation, hematuria, hematochezia, melena, changes in his hair, skin, nails, numbness or weakness in his extremities, erythema,  over any of his joints, myalgia, swollen glands, easy bruising, fatigue, edema. He denies any scrotal/testicular masses, penile discharge. He denies any symptoms of anxiety or depression.      MEDICATIONS:   Reviewed and/or reconciled in EPIC    ALLERGIES:  Reviewed and/or reconciled in [facility]    PAST MEDICAL/SURGICAL HISTORY:   Past Medical History:   Diagnosis Date    Colon polyp     GERD (gastroesophageal reflux disease)     Giardial enteritis     H. pylori infection 2015    Prostatitis       Past Surgical History:   Procedure Laterality Date    COLONOSCOPY N/A 12/16/2015    Procedure: COLONOSCOPY;  Surgeon: Anton Matos MD;  Location: [facility];  Service: Endoscopy;  Laterality: N/A;    HERNIA REPAIR Right     inguinal    NASAL SEPTUM SURGERY      deviated septum, chronic sinusitis    UPPER GASTROINTESTINAL ENDOSCOPY         FAMILY HISTORY:    Family History   Problem Relation Age of Onset    Hypertension Mother     Hypertension Father     Macular degeneration Father     Prostate cancer Father 70    Migraines  "Sister     Hypertension Brother     Obesity Brother     Macular degeneration Maternal Grandmother     Stroke Maternal Grandmother     Cancer Paternal Grandfather      unknown    Glaucoma Neg Hx     Retinal detachment Neg Hx     Strabismus Neg Hx        SOCIAL HISTORY:    Social History     Social History    Marital status:      Spouse name: N/A    Number of children: N/A    Years of education: N/A     Occupational History    sports medicine      Social History Main Topics    Smoking status: Never Smoker    Smokeless tobacco: Never Used    Alcohol use Yes      Comment: seldom    Drug use: No    Sexual activity: Yes     Partners: Female     Other Topics Concern    Not on file     Social History Narrative    No narrative on file       PHYSICAL EXAM:  VITAL SIGNS:   Vitals:    01/12/18 0923   BP: 128/73   BP Location: Right arm   Patient Position: Sitting   BP Method: Small (Automatic)   Pulse: 75   Temp: 98 °F (36.7 °C)   TempSrc: Oral   Weight: 84.5 kg (186 lb 4.6 oz)   Height: 6' 1" (1.854 m)     GENERAL:  Patient appears well nourished, sitting on exam table, in no acute distress.  HEENT:  Atraumatic, normocephalic, PERRLA, EOMI, no conjunctival injection, sclerae are anicteric, normal external auditory canals,TMs clear b/l, gross hearing intact to whisper, MMM, no oropharygneal erythema or exudate.  NECK:  Supple, normal ROM, trachea is midline , no supraclavicular or cervical LAD or masses palpated.  Thyroid gland not palpable.  CARDIOVASCULAR:  RRR, normal S1 and S2, no m/r/g.  RESPIRATORY:  CTA b/l, no wheezes, rhonchi, rales.  No increased work of breathing, no  use of accessory muscles.  ABDOMEN:  Soft, nontender, nondistended, normoactive bowel sounds in all four quadrants, no rebound or guarding, no HSM or masses palpated.  Normal percussion.  EXTREMITIES:  2+ DP pulses b/l, no edema.  SKIN:  Warm, no lesions on exposed skin.  NEUROMUSCULAR:  Cranial nerves II-XII grossly intact.  " Strength is 4+/5 over upper and lower extremity flexors/extensors b/l, 2+ biceps and patellar reflexes b/l. No clubbing or cyanosis of digits/nails.  Steady gait.  PSYCH:  Patient is alert and oriented to person, time, place. They are appropriately dressed and groomed. There is normal eye contact. Rate and tone of speech is normal. Normal insight, judgement. Normal thought content and process.     LABORATORY/IMAGING STUDIES: pending    ASSESSMENT/PLAN: This is a 47 y.o. male who presents to clinic for routine medical exam    1. Routine medical exam   We will check a screening CBC, CMP, TSH, fasting lipid panel. Patient will be notified of these results and the need for any further evaluation and treatment. He will follow up with his dentist/optometrist as scheduled. We discussed the importance of increasing his exercise, continuing with a healthy, well-balanced diet.       2. Nocturia  - Urinalysis; Future  - Urine culture; Future  - PROSTATE SPECIFIC ANTIGEN, DIAGNOSTIC; Future    3. Increased urinary frequency  - Urinalysis; Future  - Urine culture; Future  - PROSTATE SPECIFIC ANTIGEN, DIAGNOSTIC; Future    4. Lumbar radiculopathy  - Ambulatory referral to Neurosurgery    5. Lumbar disc disease  - Ambulatory referral to Neurosurgery        Rossi Gutierrez MD

## 2018-01-22 NOTE — LETTER
January 23, 2018      Rossi Gutierrez MD  [address] [facility] [address]           [facility]  [address] [facility] [address] [place] [address]  Phone: [phone]  Fax: [phone]          Patient: Valentin Johnson   MR Number: [id number]   YOB: 1970   Date of Visit: 1/22/2018       Dear Dr. Rossi Gutierrez:    Thank you for referring Valentin Johnson to me for evaluation. Attached you will find relevant portions of my assessment and plan of care.    If you have questions, please do not hesitate to call me. I look forward to following Valentin Johnson along with you.    Sincerely,    Peter Smith MD    Enclosure  CC:  No Recipients    If you would like to receive this communication electronically, please contact [email or web address] or [phone] to request more information on [employer] Link access.    For providers and/or their staff who would like to refer a patient to [facility], please contact us through our one-stop-shop provider referral line, [facility], at [phone].    If you feel you have received this communication in error or would no longer like to receive these types of communications, please e-mail [email or web address]

## 2018-01-22 NOTE — PROGRESS NOTES
Neurosurgery History and Physical    Patient ID: Valentin Johnson is a 47 y.o. male.    Chief Complaint   Patient presents with    Lumbar Spine Pain (L-Spine)     pt states not extreme pain, just like back cramps and stiffness, began around the end of summer, pains began radiating to left side and down leg, has seen Dr. Jo and has had injections that did help some, but is not getting comfort anymore, pt states he wants to start exercising again and is unable to, bladder issues with decrease in flow, no bowel issues other than crampy         Review of Systems    Past Medical History:   Diagnosis Date    Colon polyp     GERD (gastroesophageal reflux disease)     Giardial enteritis     H. pylori infection 2015    Prostatitis      Social History     Social History    Marital status:      Spouse name: N/A    Number of children: N/A    Years of education: N/A     Occupational History    sports medicine      Social History Main Topics    Smoking status: Never Smoker    Smokeless tobacco: Never Used    Alcohol use Yes      Comment: seldom    Drug use: No    Sexual activity: Yes     Partners: Female     Other Topics Concern    Not on file     Social History Narrative    No narrative on file     Family History   Problem Relation Age of Onset    Hypertension Mother     Hypertension Father     Macular degeneration Father     Prostate cancer Father 70    Migraines Sister     Hypertension Brother     Obesity Brother     Macular degeneration Maternal Grandmother     Stroke Maternal Grandmother     Cancer Paternal Grandfather      unknown    Glaucoma Neg Hx     Retinal detachment Neg Hx     Strabismus Neg Hx      Review of patient's allergies indicates:   Allergen Reactions    Penicillins      Childhood allergy. Reaction unknown.       Current Outpatient Prescriptions:     B INFANTIS/B ANI/B SASHA/B BIFID (PROBIOTIC 4X ORAL), Take by mouth once daily., Disp: , Rfl:     baclofen (LIORESAL) 10  "MG tablet, Take 1 tablet (10 mg total) by mouth 3 (three) times daily., Disp: 90 tablet, Rfl: 1    multivitamin (THERAGRAN) per tablet, Take 1 tablet by mouth once daily., Disp: , Rfl:   Blood pressure 99/72, pulse 81, height 6' 1" (1.854 m), weight 84.6 kg (186 lb 9.9 oz).      Neurologic Exam     Mental Status   Oriented to person, place, and time.   Attention: normal. Concentration: normal.   Speech: speech is normal   Level of consciousness: alert  Knowledge: good.     Cranial Nerves     CN II   Visual acuity: normal    CN III, IV, VI   Pupils are equal, round, and reactive to light.  Extraocular motions are normal.     CN V   Facial sensation intact.     CN VII   Facial expression full, symmetric.     CN VIII   Hearing: intact    CN IX, X   Palate: symmetric    CN XI   CN XI normal.     CN XII   CN XII normal.     Motor Exam   Muscle bulk: normal  Overall muscle tone: normal  Right arm pronator drift: absent  Left arm pronator drift: absent    Strength   Right deltoid: 5/5  Left deltoid: 5/5  Right biceps: 5/5  Left biceps: 5/5  Right triceps: 5/5  Left triceps: 5/5  Right wrist flexion: 5/5  Left wrist flexion: 5/5  Right wrist extension: 5/5  Left wrist extension: 5/5  Right interossei: 5/5  Left interossei: 5/5  Right iliopsoas: 5/5  Left iliopsoas: 5/5  Right quadriceps: 5/5  Left quadriceps: 5/5  Right hamstrin/5  Left hamstrin/5  Right anterior tibial: 5/5  Left anterior tibial: 5/5  Right posterior tibial: 5/5  Left posterior tibial: 5/5  Right peroneal: 5/5  Left peroneal: 5/5  Right gastroc: 5/5  Left gastroc: 5/5    Sensory Exam   Light touch normal.     Gait, Coordination, and Reflexes     Gait  Gait: normal    Coordination   Romberg: negative  Finger to nose coordination: normal    Tremor   Resting tremor: absent    Reflexes   Right brachioradialis: 3+  Left brachioradialis: 3+  Right biceps: 3+  Left biceps: 3+  Right triceps: 3+  Left triceps: 3+  Right patellar: 3+  Left patellar: " "3+  Right achilles: 2+  Left achilles: 2+  Right plantar: normal  Left plantar: normal  Right Campos: present  Left Campos: present  Right ankle clonus: present (3 beats)  Left ankle clonus: present (3 beats)      Physical Exam   Constitutional: He is oriented to person, place, and time. He appears well-developed and well-nourished.   HENT:   Head: Normocephalic and atraumatic.   Eyes: EOM are normal. Pupils are equal, round, and reactive to light.   Neck: Normal range of motion. Neck supple.   Cardiovascular: Normal rate and regular rhythm.    Pulmonary/Chest: Effort normal.   Abdominal: Soft.   Musculoskeletal: Normal range of motion.   Neurological: He is alert and oriented to person, place, and time. He has a normal Finger-Nose-Finger Test and a normal Romberg Test. Gait normal.   Reflex Scores:       Tricep reflexes are 3+ on the right side and 3+ on the left side.       Bicep reflexes are 3+ on the right side and 3+ on the left side.       Brachioradialis reflexes are 3+ on the right side and 3+ on the left side.       Patellar reflexes are 3+ on the right side and 3+ on the left side.       Achilles reflexes are 2+ on the right side and 2+ on the left side.  Skin: Skin is warm and dry.   Psychiatric: He has a normal mood and affect. His speech is normal and behavior is normal. Judgment and thought content normal.   Nursing note and vitals reviewed.      Vital Signs  Pulse: 81  BP: 99/72  Pain Score:   2  Pain Loc: Back  Height and Weight  Height: 6' 1" (185.4 cm)  Weight: 84.6 kg (186 lb 9.9 oz)  BSA (Calculated - sq m): 2.09 sq meters  BMI (Calculated): 24.7  Weight in (lb) to have BMI = 25: 189.1]    Provider dictation:  I reviewed the imaging. He appears to have bilateral L5 pars defects and a slight L5-S1 spondylolisthesis. There is a large rostrally extruded disc fragment compressing the right L5 nerve root. He states he has known about the spondylolysis for decades and has been suffering from low back " pain over the years. This current exacerbation has been particularly debilitating and associated with severe radiating right buttock and leg pain. It began 4-5 months ago. He has gotten relief of his leg pain following the MARIAMA but he remains significantly impaired by his low back pain. It is interfering with him playing with his children, and any bending or lifting. It is also affecting his sleep. On exam, he has no weakness but was incidentally found to have signs of cervical myelopathy. He denies gait imbalance, upper extremity paresthesias or numbness or clumsiness.     At this point, I will order a round of PT for his low back pain, an MRI C spine without contrast to investigate his cervical myelopathy and have him return in one month. If his low back issues continue to be severe or worsen at that time, I will evaluate him for an L5-S1 TLIF, although if severe cervical spine issues are discovered on MRI, these may need to be addressed first.     Visit Diagnosis:  Lumbosacral disc herniation    Pars defect with spondylolisthesis    Myelopathy

## 2018-01-30 ENCOUNTER — HOSPITAL ENCOUNTER (OUTPATIENT)
Dept: RADIOLOGY | Facility: HOSPITAL | Age: 48
Discharge: HOME | End: 2018-01-30
Attending: NEUROLOGICAL SURGERY
Payer: COMMERCIAL

## 2018-01-30 DIAGNOSIS — G95.9 CERVICAL MYELOPATHY: Primary | ICD-10-CM

## 2018-01-30 DIAGNOSIS — M51.27 LUMBOSACRAL DISC HERNIATION: ICD-10-CM

## 2018-01-30 DIAGNOSIS — G95.9 MYELOPATHY: ICD-10-CM

## 2018-01-30 DIAGNOSIS — M54.2 NECK PAIN: ICD-10-CM

## 2018-01-30 DIAGNOSIS — G95.9 CERVICAL MYELOPATHY: ICD-10-CM

## 2018-01-30 DIAGNOSIS — M43.10 PARS DEFECT WITH SPONDYLOLISTHESIS: ICD-10-CM

## 2018-01-30 PROCEDURE — 72141 MRI NECK SPINE W/O DYE: CPT | Mod: TC

## 2018-01-30 PROCEDURE — 72100 X-RAY EXAM L-S SPINE 2/3 VWS: CPT | Mod: TC,FY

## 2018-01-30 PROCEDURE — 72100 X-RAY EXAM L-S SPINE 2/3 VWS: CPT | Mod: 26,,, | Performed by: RADIOLOGY

## 2018-01-30 PROCEDURE — 72131 CT LUMBAR SPINE W/O DYE: CPT | Mod: 26,,, | Performed by: RADIOLOGY

## 2018-01-30 PROCEDURE — 72131 CT LUMBAR SPINE W/O DYE: CPT | Mod: TC

## 2018-01-30 PROCEDURE — 72120 X-RAY BEND ONLY L-S SPINE: CPT | Mod: 26,,, | Performed by: RADIOLOGY

## 2018-01-30 PROCEDURE — 72141 MRI NECK SPINE W/O DYE: CPT | Mod: 26,,, | Performed by: RADIOLOGY

## 2018-01-31 ENCOUNTER — OFFICE VISIT (OUTPATIENT)
Dept: UROLOGY | Facility: CLINIC | Age: 48
End: 2018-01-31
Payer: COMMERCIAL

## 2018-01-31 VITALS
HEART RATE: 85 BPM | WEIGHT: 186.5 LBS | DIASTOLIC BLOOD PRESSURE: 85 MMHG | RESPIRATION RATE: 18 BRPM | HEIGHT: 73 IN | BODY MASS INDEX: 24.72 KG/M2 | SYSTOLIC BLOOD PRESSURE: 125 MMHG

## 2018-01-31 DIAGNOSIS — R39.9 LOWER URINARY TRACT SYMPTOMS (LUTS): Primary | ICD-10-CM

## 2018-01-31 PROCEDURE — 99999 PR PBB SHADOW E&M-EST. PATIENT-LVL III: CPT | Mod: PBBFAC,,, | Performed by: UROLOGY

## 2018-01-31 PROCEDURE — 3008F BODY MASS INDEX DOCD: CPT | Mod: S$GLB,,, | Performed by: UROLOGY

## 2018-01-31 PROCEDURE — 99212 OFFICE O/P EST SF 10 MIN: CPT | Mod: S$GLB,,, | Performed by: UROLOGY

## 2018-01-31 NOTE — PROGRESS NOTES
OFFICE NOTE    CHIEF COMPLAINT:  Urinary tract symptoms.    Mr. Johnson is a 47-year-old male initially evaluated by Ms. Eula Oneil on   01/16/2018.  The patient's main complaint is nocturia and urinary frequency with   decrease in the urine flow.  Her evaluation revealed that the patient have no   evidence of any predisposing conditions for urethral strictures or significant   age for BPH.  In any extent, the patient was referred to our evaluation for   further treatment.  He refers to have nocturia x2.  In occasions, the flow is   adequate.  In occasion, is restricted.  Denies dysuria.  Denies any gross   hematuria.  The patient also refers that most of the time the sensation that he   can empty the bladder satisfactorily, but many times have interrupted voiding.    The patient underwent today a flow study that was nondiagnostic.  The volume of   urine was 750, and evidently, the patient has significant abdominal voiding and   in general in between the abdominal contractions, the patient has a low urinary   flow.  I suggested to the patient that we need to do a better evaluation and I   explained to him how he needs to behave and urinate in the urine flow machine   and we are going to proceed with a new urine flow study and an ultrasound of the   prostate to rule out median lobe enlargement.  All the questions were answered   at his satisfaction and he agreed to proceed with it.  Depending on the result   of those tests, we may or not require to perform a cystoscopic evaluation.    It is to be noted that family history of Mr. Adrian is positive for prostate   cancer.  His father has recently been diagnosed with that condition.    He agreed to proceed with that testing.  He left the office in satisfactory   condition.  I spent approximately 15 to 20 minutes with Mr. Adrian.  All the time   was spent was counseling.      EOR/HN  dd: 01/31/2018 12:08:21 (CST)  td: 02/01/2018 04:26:58 (CST)  Doc ID   #[id number]  Job ID  #[id number]    CC:

## 2018-02-01 ENCOUNTER — TELEPHONE (OUTPATIENT)
Dept: NEUROSURGERY | Facility: CLINIC | Age: 48
End: 2018-02-01

## 2018-02-01 NOTE — TELEPHONE ENCOUNTER
Spoke with Smiley with insurance company and stated that MRI is valid from 1/30/18-2/14/18 with ref#[id number]

## 2018-02-08 ENCOUNTER — PROCEDURE VISIT (OUTPATIENT)
Dept: UROLOGY | Facility: CLINIC | Age: 48
End: 2018-02-08
Payer: COMMERCIAL

## 2018-02-08 VITALS
DIASTOLIC BLOOD PRESSURE: 86 MMHG | HEART RATE: 73 BPM | RESPIRATION RATE: 18 BRPM | HEIGHT: 73 IN | BODY MASS INDEX: 24.72 KG/M2 | SYSTOLIC BLOOD PRESSURE: 128 MMHG | WEIGHT: 186.5 LBS

## 2018-02-08 DIAGNOSIS — R39.9 LOWER URINARY TRACT SYMPTOMS (LUTS): ICD-10-CM

## 2018-02-08 PROCEDURE — 51741 ELECTRO-UROFLOWMETRY FIRST: CPT | Mod: 51,S$GLB,, | Performed by: UROLOGY

## 2018-02-08 PROCEDURE — 76872 US TRANSRECTAL: CPT | Mod: S$GLB,,, | Performed by: UROLOGY

## 2018-02-08 NOTE — PROCEDURES
Procedures   TRANSRECTAL PROSTATIC ULTRASOUND AND URINE FLOW STUDY REPORT    DATE: 2018  NAME:Valentin Johnson  :1970   DIAGNOSIS:Severe LUTS  Current PSA:0.94  PSAD: 0.04         URINE FLOW STUDY:  Volume:  243 ml             Average Flow:  4  ml/sec  Peak Flow: 6 ml/sec      Curve Shape: Obstructed  IMPRESSION: Restricted urine flow study                      TRANSRECTAL ULTRASOUND  Measurements:  Height: 26 mm  Width: 46 mm  Length: 40.9 mm  Volume: 25.6 cm3    Seminal vesicles/Ejaculatory Ducts: Normal  Outline/Symmetry of Prostate: WNL  Central Gland/Transition Zone: Well demarcated  Peripheral Zone: WNL  Bladder: Normal  MedianLobe: Normal    Documentation images were taken.  IMP: No evidence of BPH or median lobe  PLAN: I suggested a cystoscopy under anesthesia in case we found a urethral stricture. The rational risks and compliocations fully explained. He took the consent.  He is to call with the most convenient date.

## 2018-02-09 ENCOUNTER — CLINICAL SUPPORT (OUTPATIENT)
Dept: REHABILITATION | Facility: HOSPITAL | Age: 48
End: 2018-02-09
Attending: NEUROLOGICAL SURGERY
Payer: COMMERCIAL

## 2018-02-09 DIAGNOSIS — M51.27 LUMBOSACRAL DISC HERNIATION: ICD-10-CM

## 2018-02-09 DIAGNOSIS — M43.10 PARS DEFECT WITH SPONDYLOLISTHESIS: ICD-10-CM

## 2018-02-09 DIAGNOSIS — G95.9 MYELOPATHY: ICD-10-CM

## 2018-02-09 PROCEDURE — 97110 THERAPEUTIC EXERCISES: CPT | Mod: PN | Performed by: PHYSICAL THERAPIST

## 2018-02-09 PROCEDURE — 97161 PT EVAL LOW COMPLEX 20 MIN: CPT | Mod: PN | Performed by: PHYSICAL THERAPIST

## 2018-02-09 PROCEDURE — 97140 MANUAL THERAPY 1/> REGIONS: CPT | Mod: PN | Performed by: PHYSICAL THERAPIST

## 2018-02-09 NOTE — PATIENT INSTRUCTIONS
Hamstring Stretch: Active        Support behind right knee. Starting with knee bent, attempt to straighten knee until a comfortable stretch is felt in back of thigh. Hold _15___ seconds.  Repeat _10___ times per set. Do __1__ sets per session. Do __2__ sessions per day.     [email or web address]     Copyright © [employer]. All rights reserved.   Knee-to-Chest Stretch: Unilateral        With hand behind right knee, pull knee in to chest until a comfortable stretch is felt in lower back and buttocks. Keep back relaxed. Hold _2___ seconds.  Repeat _10___ times per set. Do _3___ sets per session. Do __2__ sessions per day.     [email or web address]     Copyright © [employer]. All rights reserved.   Piriformis (Supine)        Cross legs, right on top. Gently pull other knee toward chest until stretch is felt in buttock/hip of top leg. Hold __10__ seconds.  Repeat __10__ times per set. Do _1___ sets per session. Do __2__ sessions per day.     [email or web address]     Copyright © [employer]. All rights reserved.

## 2018-02-09 NOTE — PLAN OF CARE
TIME RECORD    Date: 02/09/2018    Start Time:  900  Stop Time:  1000    PROCEDURES:    TIMED  Procedure Time Min.    Start:  Stop:     Start:  Stop:     Start:  Stop:     Start:  Stop:          UNTIMED  Procedure Time Min.    Start:  Stop:     Start:  Stop:      Total Timed Minutes:  30  Total Timed Units:  2  Total Untimed Units:  1  Charges Billed/# of units:  3    OUTPATIENT PHYSICAL THERAPY   PATIENT EVALUATION  Onset Date: Chronic  Primary Diagnosis: No diagnosis found.  Treatment Diagnosis: Lumbar disc herniation & Right hip pain  Past Medical History:   Diagnosis Date    Colon polyp     GERD (gastroesophageal reflux disease)     Giardial enteritis     H. pylori infection 2015    Prostatitis      Precautions: Standard  Prior Therapy: PT  Medications: Valentin Johnson has a current medication list which includes the following prescription(s): b infantis/b ani/b manju/b bifid, baclofen, and multivitamin.  Nutrition:  Normal  History of Present Illness: The patient reports a 6 month h/o lumbar and right LE pain.  Prior Level of Function: Independent  Social History: The patient is  and works as a Certified Athletic Trainer  Place of Residence (Steps/Adaptations): Single story on a slab  Functional Deficits Leading to Referral/Nature of Injury: Decreased functional status, limitations with ADL  Patient Therapy Goals: Return to previous level of function    Subjective     Valentin Johnson states he has had a 6 month h/o bilateral lumbar pain with symptoms extending into the right LE past the knee. He reports pain with prolonged sitting and standing. An MRI revealed multilevel degenerative spondylosis. The patient reports he has pain with extension and pain at end range with flexion.    Pain:  Location: back  and buttocks   Description: Aching  Activities Which Increase Pain: Sitting, Standing and Lifting  Activities Which Decrease Pain: rest  Pain Scale: 0/10 at best 3/10 now  5/10 at  worst    Objective     Posture: Decreased lumbar lordosis  Palpation: Moderate point tenderness noted with palpation to the right piriformis and TFL.  Sensation: intact  Range of Motion/Strength:     Lumbar AROM: ROM-   Response to repeated movements   Flexion 22* - pain at end range- decreased pain as a result   Extension 10* - pain during the movement - no worse as a result   Right side bending    Left side bending         Flexibility:   Left   Right  Hamstrings  60*   55*   Achilles  10*   10*    Gait: Without AD    Special Tests:   Left  Right  SLR    Neg  Neg   PHILIP   Pos  Pos   Piriformis   Pos  Pos     Other:   Oswestry low back questionnaire: 46% impairment ( G code CK )    Treatment:     Dry needling was performed to the patient's right piriformis with 60 mm needles for 15 minutes. Written consent was obtained prior to the procedure and decreased pain was the result  Hamstring stretch in supine 10 x 15 sec bilaterally  SKTC x 30 bilaterally  LTR x 30   Piriformis stretch 10 x 10 sec bilaterally      Assessment       Initial Assessment (Pertinent finding, problem list and factors affecting outcome):   1. Decreased LROM  2. Decreased hamstring flexibility  3. Soft tissue tenderness  4. Oswestry impairment rating    Rehab Potiential: good    Short Term Goals (3 Weeks):   1. The patient will begin a written HEP  2. Increase hamstring flexibility to 65*  3. Decrease soft tissue tenderness to mild    Long Term Goals (6 Weeks):   1. The patient will be independent with his HEP for maintenance  2. Increase hamstring flexibility to 70*  3. Decrease Oswestry score to <20 ( G code CI )  4. Eliminate soft tissue tenderness    Plan     Certification Period: 2/9/2018 to 3/30/2018  Recommended Treatment Plan: 1 times per week for 6 weeks: Cervical/Lumbar Traction, Electrical Stimulation IFC/TENS, Group Therapy, Manual Therapy, Moist Heat/ Ice, Patient Education, Therapeutic Activites, Therapeutic Exercise and Other Dry  needling  Other Recommendations: LROM exercises, LE flexibility exercises, lumbar stabilization/core strengthening, soft tissue mobilization, cardiovascular conditioning exercises, LE strengthening exercises    Thank you for this referral        Therapist: Richardson Maravilla PT    I CERTIFY THE NEED FOR THESE SERVICES FURNISHED UNDER THIS PLAN OF TREATMENT AND WHILE UNDER MY CARE    Physician's comments: ________________________________________________________________________________________________________________________________________________      Physician's Name: ___________________________________

## 2018-02-20 ENCOUNTER — CLINICAL SUPPORT (OUTPATIENT)
Dept: REHABILITATION | Facility: HOSPITAL | Age: 48
End: 2018-02-20
Attending: NEUROLOGICAL SURGERY
Payer: COMMERCIAL

## 2018-02-20 DIAGNOSIS — M51.27 LUMBOSACRAL DISC HERNIATION: ICD-10-CM

## 2018-02-20 PROCEDURE — 97110 THERAPEUTIC EXERCISES: CPT | Mod: PN

## 2018-02-20 NOTE — PATIENT INSTRUCTIONS
Strengthening: Hip Adduction - Isometric        With ball or folded pillow between knees, squeeze knees together. Hold ____ seconds.  Repeat ____ times per set. Do ____ sets per session. Do ____ sessions per day.     https://[employer]/612     Copyright © [employer]. All rights reserved.   Strengthening: Hip Abductor - Resisted        With band looped around both legs above knees, push thighs apart.  Repeat ____ times per set. Do ____ sets per session. Do ____ sessions per day.     https://[employer]/688     Copyright © [employer]. All rights reserved.   Lumbar Rotation (Non-Weight Bearing)        Feet on floor, slowly rock knees from side to side in small, pain-free range of motion. Allow lower back to rotate slightly.  Repeat ____ times per set. Do ____ sets per session. Do ____ sessions per day.     [email or web address]/160     Copyright © [employer]. All rights reserved.   Bridging        Slowly raise buttocks from floor, keeping stomach tight.  Repeat ____ times per set. Do ____ sets per session. Do ____ sessions per day.     [email or web address]/1096     Copyright © [employer]. All rights reserved.     Straight Leg Raise        Tighten stomach and slowly raise locked right leg ____ inches from floor.  Repeat ____ times per set. Do ____ sets per session. Do ____ sessions per day.     [email or web address]/1102     Copyright © [employer]. All rights reserved.

## 2018-02-20 NOTE — PROGRESS NOTES
[id number]  Date of Treatment: 02/20/2018   Diagnosis:   Encounter Diagnosis   Name Primary?    Lumbosacral disc herniation        Time in: 0905  Time Out: 0955  Total Treatment Time: 50    Subjective:    Valentin reports improvement of symptoms.  Patient denies pain at present, stating he has been feeling much better since eval/DN session.     Objective:    Patient received individual therapy to increase strength, endurance, ROM, flexibility, posture and core stabilization with activities as follows:     Valentin received therapeutic exercises to develop strength, endurance, ROM, flexibility, posture and core stabilization for 50 minutes including:     NuStep L5 10' LE's   Standing TrA sets with swiss ball 30  Squats 10/3   Standing gastroc stretches 3/30s B over 1/2 foam roll in // bars  Supine hamstring stretches 10 x 15s L/R  Supine piriformis stretches 10 x 10s L/R  Supine hooklying hip aDd ballsqueezes 10/3  Supine hooklying hip aBd clamshells isometrics 10/3 B  Bridges 10/3 B  Supine SLR L/R 10/3 low range for stabilization    Continue HEP daily. Pt verbalizes, demonstrates good understanding of proper body mechanics. Written and pictorial HEP of ex's in EPIC to add to HEP reviewed and issued to pt. Pt instructed to perform HEP daily and stop if symptoms increase.       Pt demo good understanding of the education provided. Patient demonstrated good return demonstration of activities.     Assessment:     Good tolerance for progression of ex's without discomfort reported. Motivated and eager to improve.     Pt will continue to benefit from skilled PT intervention. Medical Necessity is demonstrated by:  Requires skilled supervision to complete and progress HEP and Weakness.    Patient is making good progress towards established goals.    Plan:    Continue with established Plan of Care towards PT goals.

## 2018-04-20 NOTE — PROGRESS NOTES
REHAB SERVICES OUTPATIENT DISCHARGE SUMMARY  Physical Therapy      Name:  Valentin Johnson  Date:  4/20/2018  Date of Evaluation:  2/9/2018  Physician:  Peter Smith MD  Total # Of Visits:  2  Cancelled:  0  No Shows:  0  Diagnosis:    1. Lumbosacral disc herniation         Physical/Functional Status:  At time of discharge, patient was able to independently perform his/her written HEP.    The patient is to be discharged from our Therapy service for the following reason(s):  Patient has met all of his/her goals    Degree of Goal Achievement:  Patient has met all goals    Patient Education:  Patient was given a written HEP    Discharge Plan:  Home Program:

## 2018-05-23 ENCOUNTER — TELEPHONE (OUTPATIENT)
Dept: UROLOGY | Facility: CLINIC | Age: 48
End: 2018-05-23

## 2018-05-23 NOTE — TELEPHONE ENCOUNTER
----- Message from Tonya Delgado LPN sent at 5/22/2018  5:10 PM CDT -----  Kathleen JIMENEZ Staff  Caller: self (Today,  4:20 PM)         Patient want to speak with a nurse regarding scheduling procedure please call back at [phone]

## 2018-06-13 ENCOUNTER — OFFICE VISIT (OUTPATIENT)
Dept: OPTOMETRY | Facility: CLINIC | Age: 48
End: 2018-06-13
Payer: COMMERCIAL

## 2018-06-13 DIAGNOSIS — H52.7 REFRACTIVE ERROR: ICD-10-CM

## 2018-06-13 DIAGNOSIS — Z01.00 EXAMINATION OF EYES AND VISION: Primary | ICD-10-CM

## 2018-06-13 PROCEDURE — 92014 COMPRE OPH EXAM EST PT 1/>: CPT | Mod: S$GLB,,, | Performed by: OPTOMETRIST

## 2018-06-13 PROCEDURE — 92015 DETERMINE REFRACTIVE STATE: CPT | Mod: S$GLB,,, | Performed by: OPTOMETRIST

## 2018-06-13 PROCEDURE — 99999 PR PBB SHADOW E&M-EST. PATIENT-LVL II: CPT | Mod: PBBFAC,,, | Performed by: OPTOMETRIST

## 2018-06-13 NOTE — PROGRESS NOTES
HPI     Presenting Complaint: Pt here today for yearly eye exam. Pt states   distance vision has been stable.  Pt uses RX glasses for near and computer   distance. Pt tried progressive lens and was not happy with lens.    Ophthalmic medication / drops: None    (-) headaches  (-) diplopia   (-) flashes / (-) floaters      Last edited by Brenton Alejo, OD on 6/13/2018  1:50 PM. (History)            Assessment /Plan     For exam results, see Encounter Report.    Examination of eyes and vision    Refractive error      Good ocular health OU. Dispensed updated spectacle Rx. Discussed various spectacle lens options, pt prefers intermediate only and near only. Cut rx, pt has long arms and holds things at a distance. Discussed adaptation period to new specs.       RTC in 1 year for comprehensive eye exam, or sooner prn.

## 2019-06-05 DIAGNOSIS — Z00.00 ANNUAL PHYSICAL EXAM: Primary | ICD-10-CM

## 2019-06-11 ENCOUNTER — CLINICAL SUPPORT (OUTPATIENT)
Dept: INTERNAL MEDICINE | Facility: CLINIC | Age: 49
End: 2019-06-11
Attending: FAMILY MEDICINE
Payer: COMMERCIAL

## 2019-06-11 ENCOUNTER — HOSPITAL ENCOUNTER (OUTPATIENT)
Dept: RADIOLOGY | Facility: HOSPITAL | Age: 49
Discharge: HOME | End: 2019-06-11
Attending: FAMILY MEDICINE
Payer: COMMERCIAL

## 2019-06-11 ENCOUNTER — OFFICE VISIT (OUTPATIENT)
Dept: FAMILY MEDICINE | Facility: CLINIC | Age: 49
End: 2019-06-11
Attending: FAMILY MEDICINE
Payer: COMMERCIAL

## 2019-06-11 VITALS
WEIGHT: 185.88 LBS | HEIGHT: 72 IN | SYSTOLIC BLOOD PRESSURE: 118 MMHG | BODY MASS INDEX: 25.18 KG/M2 | TEMPERATURE: 98 F | DIASTOLIC BLOOD PRESSURE: 82 MMHG | HEART RATE: 62 BPM

## 2019-06-11 DIAGNOSIS — Z00.00 WELLNESS EXAMINATION: Primary | ICD-10-CM

## 2019-06-11 DIAGNOSIS — Z00.00 ANNUAL PHYSICAL EXAM: ICD-10-CM

## 2019-06-11 DIAGNOSIS — Z00.00 ANNUAL PHYSICAL EXAM: Primary | ICD-10-CM

## 2019-06-11 LAB
ALBUMIN SERPL BCP-MCNC: 4 G/DL (ref 3.5–5.2)
ALP SERPL-CCNC: 60 U/L (ref 55–135)
ALT SERPL W/O P-5'-P-CCNC: 12 U/L (ref 10–44)
ANION GAP SERPL CALC-SCNC: 7 MMOL/L (ref 8–16)
AST SERPL-CCNC: 16 U/L (ref 10–40)
BILIRUB SERPL-MCNC: 0.4 MG/DL (ref 0.1–1)
BUN SERPL-MCNC: 16 MG/DL (ref 6–20)
CALCIUM SERPL-MCNC: 9.7 MG/DL (ref 8.7–10.5)
CHLORIDE SERPL-SCNC: 105 MMOL/L (ref 95–110)
CHOLEST SERPL-MCNC: 141 MG/DL (ref 120–199)
CHOLEST/HDLC SERPL: 2.9 {RATIO} (ref 2–5)
CO2 SERPL-SCNC: 28 MMOL/L (ref 23–29)
CREAT SERPL-MCNC: 1 MG/DL (ref 0.5–1.4)
ERYTHROCYTE [DISTWIDTH] IN BLOOD BY AUTOMATED COUNT: 13.7 % (ref 11.5–14.5)
EST. GFR  (AFRICAN AMERICAN): >60 ML/MIN/1.73 M^2
EST. GFR  (NON AFRICAN AMERICAN): >60 ML/MIN/1.73 M^2
GLUCOSE SERPL-MCNC: 100 MG/DL (ref 70–110)
HCT VFR BLD AUTO: 42.9 % (ref 40–54)
HDLC SERPL-MCNC: 48 MG/DL (ref 40–75)
HDLC SERPL: 34 % (ref 20–50)
HGB BLD-MCNC: 13.8 G/DL (ref 14–18)
LDLC SERPL CALC-MCNC: 82.6 MG/DL (ref 63–159)
MCH RBC QN AUTO: 29.2 PG (ref 27–31)
MCHC RBC AUTO-ENTMCNC: 32.2 G/DL (ref 32–36)
MCV RBC AUTO: 91 FL (ref 82–98)
NONHDLC SERPL-MCNC: 93 MG/DL
PLATELET # BLD AUTO: 326 K/UL (ref 150–350)
PMV BLD AUTO: 10.4 FL (ref 9.2–12.9)
POTASSIUM SERPL-SCNC: 4.4 MMOL/L (ref 3.5–5.1)
PROT SERPL-MCNC: 6.9 G/DL (ref 6–8.4)
RBC # BLD AUTO: 4.72 M/UL (ref 4.6–6.2)
SODIUM SERPL-SCNC: 140 MMOL/L (ref 136–145)
TRIGL SERPL-MCNC: 52 MG/DL (ref 30–150)
WBC # BLD AUTO: 4.43 K/UL (ref 3.9–12.7)

## 2019-06-11 PROCEDURE — 71046 XR CHEST PA AND LATERAL: ICD-10-PCS | Mod: 26,,, | Performed by: RADIOLOGY

## 2019-06-11 PROCEDURE — 80053 COMPREHEN METABOLIC PANEL: CPT

## 2019-06-11 PROCEDURE — 99386 PR PREVENTIVE VISIT,NEW,40-64: ICD-10-PCS | Mod: S$GLB,,, | Performed by: FAMILY MEDICINE

## 2019-06-11 PROCEDURE — 71046 X-RAY EXAM CHEST 2 VIEWS: CPT | Mod: TC,FY,PO

## 2019-06-11 PROCEDURE — 85027 COMPLETE CBC AUTOMATED: CPT

## 2019-06-11 PROCEDURE — 99999 PR PBB SHADOW E&M-EST. PATIENT-LVL III: CPT | Mod: PBBFAC,,, | Performed by: FAMILY MEDICINE

## 2019-06-11 PROCEDURE — 99999 PR PBB SHADOW E&M-EST. PATIENT-LVL III: ICD-10-PCS | Mod: PBBFAC,,, | Performed by: FAMILY MEDICINE

## 2019-06-11 PROCEDURE — 99386 PREV VISIT NEW AGE 40-64: CPT | Mod: S$GLB,,, | Performed by: FAMILY MEDICINE

## 2019-06-11 PROCEDURE — 71046 X-RAY EXAM CHEST 2 VIEWS: CPT | Mod: 26,,, | Performed by: RADIOLOGY

## 2019-06-11 PROCEDURE — 80061 LIPID PANEL: CPT

## 2019-06-11 RX ORDER — DICLOFENAC SODIUM 50 MG/1
50 TABLET, DELAYED RELEASE ORAL 2 TIMES DAILY PRN
Qty: 30 TABLET | Refills: 1 | Status: SHIPPED | OUTPATIENT
Start: 2019-06-11 | End: 2020-07-21

## 2019-06-11 NOTE — PROGRESS NOTES
Subjective:       Patient ID: Valentin Johnson is a 48 y.o. male.    Chief Complaint: Chest Pain (cramps in his chest)    Here for executive health exam. Some chest discomfort with certain positions and movements.     Review of Systems   Constitutional: Negative for chills, fatigue and fever.   Respiratory: Negative for cough, chest tightness and shortness of breath.    Cardiovascular: Negative for chest pain, palpitations and leg swelling.   Endocrine: Negative for cold intolerance and heat intolerance.   Skin: Negative for rash.   Psychiatric/Behavioral: Negative for dysphoric mood. The patient is not nervous/anxious.        Objective:      Physical Exam   Constitutional: He appears well-developed and well-nourished.   HENT:   Head: Normocephalic and atraumatic.   Cardiovascular: Normal rate, regular rhythm and normal heart sounds.   Pulmonary/Chest: Effort normal and breath sounds normal.   Psychiatric: He has a normal mood and affect.   Nursing note and vitals reviewed.      Assessment:       1. Wellness examination        Plan:       Wellness examination    Other orders  -     diclofenac (VOLTAREN) 50 MG EC tablet; Take 1 tablet (50 mg total) by mouth 2 (two) times daily as needed.  Dispense: 30 tablet; Refill: 1      Trial of nsaid for likely costochondritis  I have reviewed your recent labs and stress test. They are in acceptable range with good exercise tolerance.  Thank you for choosing me for your executive health exam. If you have any questions do not hesitate to contact my office.    Thank you,    Dr. Castañeda     Follow up in about 1 year (around 6/11/2020), or if symptoms worsen or fail to improve.

## 2019-06-13 ENCOUNTER — CLINICAL SUPPORT (OUTPATIENT)
Dept: CARDIOLOGY | Facility: CLINIC | Age: 49
End: 2019-06-13
Attending: FAMILY MEDICINE
Payer: COMMERCIAL

## 2019-06-13 VITALS — BODY MASS INDEX: 25.06 KG/M2 | WEIGHT: 185 LBS | HEIGHT: 72 IN

## 2019-06-13 DIAGNOSIS — Z00.00 ANNUAL PHYSICAL EXAM: ICD-10-CM

## 2019-06-13 LAB
CV STRESS BASE HR: 58 BPM
DIASTOLIC BLOOD PRESSURE: 80 MMHG
OHS CV CPX 1 MINUTE RECOVERY HEART RATE: 144 BPM
OHS CV CPX 85 PERCENT MAX PREDICTED HEART RATE MALE: 146
OHS CV CPX ESTIMATED METS: 16
OHS CV CPX MAX PREDICTED HEART RATE: 172
OHS CV CPX PATIENT IS FEMALE: 0
OHS CV CPX PATIENT IS MALE: 1
OHS CV CPX PEAK DIASTOLIC BLOOD PRESSURE: 62 MMHG
OHS CV CPX PEAK HEAR RATE: 162 BPM
OHS CV CPX PEAK RATE PRESSURE PRODUCT: NORMAL
OHS CV CPX PEAK SYSTOLIC BLOOD PRESSURE: 156 MMHG
OHS CV CPX PERCENT MAX PREDICTED HEART RATE ACHIEVED: 94
OHS CV CPX RATE PRESSURE PRODUCT PRESENTING: 7076
STRESS ECHO POST EXERCISE DUR MIN: 8 MINUTES
STRESS ECHO POST EXERCISE DUR SEC: 49 SECONDS
STRESS ECHO TARGET HR: 146.2 BPM
SYSTOLIC BLOOD PRESSURE: 122 MMHG

## 2019-06-13 PROCEDURE — 99999 PR PBB SHADOW E&M-EST. PATIENT-LVL I: ICD-10-PCS | Mod: PBBFAC,,,

## 2019-06-13 PROCEDURE — 93015 TREADMILL STRESS TEST (CUPID ONLY): ICD-10-PCS | Mod: S$GLB,,, | Performed by: INTERNAL MEDICINE

## 2019-06-13 PROCEDURE — 93015 CV STRESS TEST SUPVJ I&R: CPT | Mod: S$GLB,,, | Performed by: INTERNAL MEDICINE

## 2019-06-13 PROCEDURE — 99999 PR PBB SHADOW E&M-EST. PATIENT-LVL I: CPT | Mod: PBBFAC,,,

## 2019-06-17 ENCOUNTER — TELEPHONE (OUTPATIENT)
Dept: FAMILY MEDICINE | Facility: CLINIC | Age: 49
End: 2019-06-17

## 2019-06-17 NOTE — TELEPHONE ENCOUNTER
Patient requesting Stress test results   States he is having Tingling in the left arm   No Chest pain   Advised ED if symptoms get worse

## 2019-06-17 NOTE — TELEPHONE ENCOUNTER
----- Message from Catherine Hylton sent at 6/17/2019  4:15 PM CDT -----  Contact: Patient  Patient wants to know if anything showed on the results of his stress test.  The symptoms are getting a little worse and feels like a tingling in his left arm.  Is there another test he needs to follow up with Dr. Castañeda?  Please call.

## 2020-02-05 ENCOUNTER — OFFICE VISIT (OUTPATIENT)
Dept: OPTOMETRY | Facility: CLINIC | Age: 50
End: 2020-02-05
Payer: COMMERCIAL

## 2020-02-05 DIAGNOSIS — H52.7 REFRACTIVE ERROR: ICD-10-CM

## 2020-02-05 DIAGNOSIS — Z01.00 EXAMINATION OF EYES AND VISION: Primary | ICD-10-CM

## 2020-02-05 PROCEDURE — 92014 PR EYE EXAM, EST PATIENT,COMPREHESV: ICD-10-PCS | Mod: S$GLB,,, | Performed by: OPTOMETRIST

## 2020-02-05 PROCEDURE — 92014 COMPRE OPH EXAM EST PT 1/>: CPT | Mod: S$GLB,,, | Performed by: OPTOMETRIST

## 2020-02-05 PROCEDURE — 92015 PR REFRACTION: ICD-10-PCS | Mod: S$GLB,,, | Performed by: OPTOMETRIST

## 2020-02-05 PROCEDURE — 99999 PR PBB SHADOW E&M-EST. PATIENT-LVL II: ICD-10-PCS | Mod: PBBFAC,,, | Performed by: OPTOMETRIST

## 2020-02-05 PROCEDURE — 92015 DETERMINE REFRACTIVE STATE: CPT | Mod: S$GLB,,, | Performed by: OPTOMETRIST

## 2020-02-05 PROCEDURE — 99999 PR PBB SHADOW E&M-EST. PATIENT-LVL II: CPT | Mod: PBBFAC,,, | Performed by: OPTOMETRIST

## 2020-02-05 NOTE — PROGRESS NOTES
HPI     DLS: 6/13/18    Pt here for annual exam. Pt states distance vision is great, has trouble   with near. Hard when driving and looking down at dashboard. States lost   specs, only has computer specs still. Pt denies pain in eyes today. Denies   F/F. Not using any gtts.     Last edited by Carmelita Robison on 2/5/2020  9:12 AM. (History)            Assessment /Plan     For exam results, see Encounter Report.    Examination of eyes and vision    Refractive error      Good ocular health of both eyes. Dispensed updated spectacle Rx. Discussed various spectacle lens options, tried PALs in the past with trouble. Recommend trying lined bifocal for distance/intermediate. Discussed adaptation period to new specs. Return if any trouble with new specs.       RTC in 1 year for comprehensive eye exam, or sooner prn.

## 2020-04-21 DIAGNOSIS — Z01.84 ANTIBODY RESPONSE EXAMINATION: ICD-10-CM

## 2020-04-23 ENCOUNTER — LAB VISIT (OUTPATIENT)
Dept: LAB | Facility: HOSPITAL | Age: 50
End: 2020-04-23
Attending: INTERNAL MEDICINE
Payer: COMMERCIAL

## 2020-04-23 DIAGNOSIS — Z01.84 ANTIBODY RESPONSE EXAMINATION: ICD-10-CM

## 2020-04-23 LAB — SARS-COV-2 IGG SERPL QL IA: NEGATIVE

## 2020-04-23 PROCEDURE — 36415 COLL VENOUS BLD VENIPUNCTURE: CPT

## 2020-04-23 PROCEDURE — 86769 SARS-COV-2 COVID-19 ANTIBODY: CPT

## 2020-07-10 ENCOUNTER — OFFICE VISIT (OUTPATIENT)
Dept: FAMILY MEDICINE | Facility: CLINIC | Age: 50
End: 2020-07-10
Payer: COMMERCIAL

## 2020-07-10 ENCOUNTER — HOSPITAL ENCOUNTER (OUTPATIENT)
Dept: RADIOLOGY | Facility: HOSPITAL | Age: 50
Discharge: HOME | End: 2020-07-10
Attending: NURSE PRACTITIONER
Payer: COMMERCIAL

## 2020-07-10 VITALS
WEIGHT: 195.13 LBS | HEART RATE: 60 BPM | HEIGHT: 72 IN | DIASTOLIC BLOOD PRESSURE: 82 MMHG | RESPIRATION RATE: 16 BRPM | TEMPERATURE: 98 F | OXYGEN SATURATION: 98 % | BODY MASS INDEX: 26.43 KG/M2 | SYSTOLIC BLOOD PRESSURE: 126 MMHG

## 2020-07-10 DIAGNOSIS — R10.32 LEFT LOWER QUADRANT PAIN: ICD-10-CM

## 2020-07-10 DIAGNOSIS — K57.30 DIVERTICULOSIS OF LARGE INTESTINE WITHOUT HEMORRHAGE: ICD-10-CM

## 2020-07-10 DIAGNOSIS — R19.5 ABNORMAL FINDINGS IN STOOL: Primary | ICD-10-CM

## 2020-07-10 DIAGNOSIS — Z86.010 HX OF COLONIC POLYPS: ICD-10-CM

## 2020-07-10 PROCEDURE — 3008F PR BODY MASS INDEX (BMI) DOCUMENTED: ICD-10-PCS | Mod: CPTII,S$GLB,, | Performed by: NURSE PRACTITIONER

## 2020-07-10 PROCEDURE — 3008F BODY MASS INDEX DOCD: CPT | Mod: CPTII,S$GLB,, | Performed by: NURSE PRACTITIONER

## 2020-07-10 PROCEDURE — 99214 PR OFFICE/OUTPT VISIT, EST, LEVL IV, 30-39 MIN: ICD-10-PCS | Mod: S$GLB,,, | Performed by: NURSE PRACTITIONER

## 2020-07-10 PROCEDURE — 74178 CT ABD&PLV WO CNTR FLWD CNTR: CPT | Mod: 26,,, | Performed by: RADIOLOGY

## 2020-07-10 PROCEDURE — 99999 PR PBB SHADOW E&M-EST. PATIENT-LVL V: CPT | Mod: PBBFAC,,, | Performed by: NURSE PRACTITIONER

## 2020-07-10 PROCEDURE — 74178 CT ABDOMEN PELVIS W WO CONTRAST: ICD-10-PCS | Mod: 26,,, | Performed by: RADIOLOGY

## 2020-07-10 PROCEDURE — 74178 CT ABD&PLV WO CNTR FLWD CNTR: CPT | Mod: TC

## 2020-07-10 PROCEDURE — 99214 OFFICE O/P EST MOD 30 MIN: CPT | Mod: S$GLB,,, | Performed by: NURSE PRACTITIONER

## 2020-07-10 PROCEDURE — 25500020 PHARM REV CODE 255

## 2020-07-10 PROCEDURE — A9698 NON-RAD CONTRAST MATERIALNOC: HCPCS

## 2020-07-10 PROCEDURE — 99999 PR PBB SHADOW E&M-EST. PATIENT-LVL V: ICD-10-PCS | Mod: PBBFAC,,, | Performed by: NURSE PRACTITIONER

## 2020-07-10 RX ADMIN — IOHEXOL 100 ML: 350 INJECTION, SOLUTION INTRAVENOUS at 07:07

## 2020-07-10 RX ADMIN — IOHEXOL 1000 ML: 9 SOLUTION ORAL at 07:07

## 2020-07-10 NOTE — PATIENT INSTRUCTIONS
Lower GI Bleeding (Stable)  You have signs of blood in your stool. This is called rectal bleeding. The bleeding may have begun in another part of your gastrointestinal (GI) tract. If the blood is bright red, it is likely coming from the lower part of the GI tract. If the blood is black or dark, it might be coming from higher up in the GI tract. Very small amounts of GI bleeding may not be visible and can only be discovered during a test on your stool. Possible causes of lower GI bleeding include:  · Hemorrhoids  · Anal fissures  · Diverticulitis  · Inflammatory bowel disease (Crohn's disease or ulcerative colitis)  · Polyps (growths) in the intestine  Note: Iron supplements and medicines for diarrhea or upset stomach can cause black stools. Foods such as licorice and red beets can also discolor the stool and be mistaken for bleeding. These are not bleeding and are not a cause for alarm.  Home care  You have not lost a large amount of blood and your condition appears stable at this time. You may resume normal activity as long as you feel well.  Avoid NSAIDs, such as aspirin, ibuprofen, or naproxen. They can irritate the stomach and cause further bleeding. If you are taking these medicines for other medical reasons, talk to your healthcare provider before you stop them.   Follow-up care  Follow up with your healthcare provider as advised. Further tests may be needed to find the cause of your bleeding.  When to seek medical advice  Call your healthcare provider for any of the following:  · Large amount of rectal bleeding   · Increasing abdominal pain  · Weakness, dizziness  Call 911  Get emergency medical care if any of the following occur:  · Loss of consciousness  · Vomiting blood  Date Last Reviewed: 6/24/2015  © 2952-0827 [employer]. [address] [place] [address]. All rights reserved. This information is not intended as a substitute for professional medical care. Always follow your  healthcare professional's instructions.

## 2020-07-10 NOTE — PROGRESS NOTES
Patient ID: Valentin Johnson is a 49 y.o. male.    Chief Complaint:   GI Problem    GI Problem  The primary symptoms include fatigue, abdominal pain and hematochezia. Primary symptoms do not include fever, weight loss, nausea, vomiting, diarrhea, melena, hematemesis, jaundice, dysuria, myalgias, arthralgias or rash. The illness began 3 to 5 days ago. The onset was gradual. The problem has been gradually worsening.   The hematochezia began 3 to 5 days ago. The hematochezia has occurred 2 to 5 times per day. The hematochezia is a new problem.   The illness does not include chills, dysphagia, odynophagia, bloating, constipation, back pain or itching.      Patient is new to me. Reviewed past medical and social history.    Past Medical History:   Diagnosis Date    Colon polyp     GERD (gastroesophageal reflux disease)     Giardial enteritis     H. pylori infection 2015    Prostatitis      Past Surgical History:   Procedure Laterality Date    COLONOSCOPY N/A 12/16/2015    Procedure: COLONOSCOPY;  Surgeon: Anton Matos MD;  Location: [facility];  Service: Endoscopy;  Laterality: N/A;    HERNIA REPAIR Right     inguinal    NASAL SEPTUM SURGERY      deviated septum, chronic sinusitis    UPPER GASTROINTESTINAL ENDOSCOPY       Current Outpatient Medications on File Prior to Visit   Medication Sig Dispense Refill    B INFANTIS/B ANI/B SASHA/B BIFID (PROBIOTIC 4X ORAL) Take by mouth once daily.      baclofen (LIORESAL) 10 MG tablet Take 1 tablet (10 mg total) by mouth 3 (three) times daily. (Patient not taking: Reported on 2/5/2020) 90 tablet 1    diclofenac (VOLTAREN) 50 MG EC tablet Take 1 tablet (50 mg total) by mouth 2 (two) times daily as needed. (Patient not taking: Reported on 2/5/2020) 30 tablet 1    multivitamin (THERAGRAN) per tablet Take 1 tablet by mouth once daily.       No current facility-administered medications on file prior to visit.        Review of Systems   Constitutional: Positive for  fatigue. Negative for chills, fever and weight loss.   HENT: Negative for congestion.    Respiratory: Negative for shortness of breath.    Cardiovascular: Negative for chest pain and palpitations.   Gastrointestinal: Positive for abdominal pain, blood in stool and hematochezia. Negative for abdominal distention, bloating, constipation, diarrhea, dysphagia, hematemesis, jaundice, melena, nausea, rectal pain and vomiting.   Genitourinary: Negative for dysuria.   Musculoskeletal: Negative for arthralgias, back pain and myalgias.   Skin: Negative for itching and rash.   All other systems reviewed and are negative.      Objective:      Physical Exam  Vitals signs reviewed.   Constitutional:       Appearance: Normal appearance. He is well-developed.   HENT:      Head: Normocephalic and atraumatic.      Mouth/Throat:      Pharynx: No oropharyngeal exudate.   Eyes:      Conjunctiva/sclera: Conjunctivae normal.   Neck:      Musculoskeletal: Normal range of motion.      Thyroid: No thyromegaly.      Vascular: No JVD.      Trachea: No tracheal deviation.   Cardiovascular:      Rate and Rhythm: Normal rate and regular rhythm.   Pulmonary:      Effort: Pulmonary effort is normal.   Abdominal:      General: Bowel sounds are normal. There is no distension.      Palpations: Abdomen is soft.      Tenderness: There is no abdominal tenderness.   Genitourinary:     Comments: Exam deferred per patient request  Musculoskeletal: Normal range of motion.   Skin:     General: Skin is warm and dry.      Capillary Refill: Capillary refill takes less than 2 seconds.   Neurological:      General: No focal deficit present.      Mental Status: He is alert and oriented to person, place, and time.   Psychiatric:         Mood and Affect: Mood normal.         Assessment:       1. Abnormal findings in stool    2. Left lower quadrant pain    3. Hx of colonic polyps    4. Diverticulosis of large intestine without hemorrhage        Plan:       Vaelntin mora  seen today for gi problem.    Diagnoses and all orders for this visit:    Abnormal findings in stool  -     CBC auto differential; Future    Left lower quadrant pain  -     CBC auto differential; Future  -     Basic metabolic panel; Future  -     Comprehensive metabolic panel; Future  -     CT Abdomen Pelvis W Wo Contrast; Future  -     Ambulatory referral/consult to Gastroenterology; Future    Hx of colonic polyps    Diverticulosis of large intestine without hemorrhage    CT and lab work today. Will call  Patient with results and discuss further plan of care.  Follow up with GI for further evaluation.  If bleeding worsens as well as you begin to feel weak, light headed, or dizzy go to the nearest ED for further evaluation.  Patient education provided.  All questions and concerns addressed  RTC PRN and if symptoms worsen or fail to improve  Patient verbalizes understanding        Patient Instructions     Lower GI Bleeding (Stable)  You have signs of blood in your stool. This is called rectal bleeding. The bleeding may have begun in another part of your gastrointestinal (GI) tract. If the blood is bright red, it is likely coming from the lower part of the GI tract. If the blood is black or dark, it might be coming from higher up in the GI tract. Very small amounts of GI bleeding may not be visible and can only be discovered during a test on your stool. Possible causes of lower GI bleeding include:  · Hemorrhoids  · Anal fissures  · Diverticulitis  · Inflammatory bowel disease (Crohn's disease or ulcerative colitis)  · Polyps (growths) in the intestine  Note: Iron supplements and medicines for diarrhea or upset stomach can cause black stools. Foods such as licorice and red beets can also discolor the stool and be mistaken for bleeding. These are not bleeding and are not a cause for alarm.  Home care  You have not lost a large amount of blood and your condition appears stable at this time. You may resume normal  activity as long as you feel well.  Avoid NSAIDs, such as aspirin, ibuprofen, or naproxen. They can irritate the stomach and cause further bleeding. If you are taking these medicines for other medical reasons, talk to your healthcare provider before you stop them.   Follow-up care  Follow up with your healthcare provider as advised. Further tests may be needed to find the cause of your bleeding.  When to seek medical advice  Call your healthcare provider for any of the following:  · Large amount of rectal bleeding   · Increasing abdominal pain  · Weakness, dizziness  Call 911  Get emergency medical care if any of the following occur:  · Loss of consciousness  · Vomiting blood  Date Last Reviewed: 6/24/2015  © 8589-2054 [employer]. [address] [place] [address], [place] [address]. All rights reserved. This information is not intended as a substitute for professional medical care. Always follow your healthcare professional's instructions.

## 2020-07-13 ENCOUNTER — TELEPHONE (OUTPATIENT)
Dept: FAMILY MEDICINE | Facility: CLINIC | Age: 50
End: 2020-07-13

## 2020-07-13 DIAGNOSIS — N28.89 OTHER SPECIFIED DISORDERS OF KIDNEY AND URETER: ICD-10-CM

## 2020-07-13 NOTE — TELEPHONE ENCOUNTER
----- Message from Diane Colón sent at 7/13/2020 10:35 AM CDT -----  Contact: Patient  Type: Needs Medical Advice  Who Called:  Patient  Best Call Back Number: [phone]  Additional Information: Calling to speak with someone about his upcoming MRI. Patient has already spoken with the  but he is concerned about waiting til the 15th for his appointment.

## 2020-07-13 NOTE — TELEPHONE ENCOUNTER
Spoke to patient in regards to test results. Patient verbalized understanding but is getting worried. Pt bleeding has subsided and he has not made an appointment with GI yet d/t waiting on CT results/labs. Pt stated, his MRI appointment is scheduled for Wednesday, 07/15/2020 at 0600 but would like to get it done sooner and would like to know if you could put order in as stat? Pt also have questions in regards to current CT compared to CT done in 2018 and would like further explanation as to if mass was noted in 2018 as well. Please advise.        Message sent to JOSE Stark DNP in separate encounter via result notes.

## 2020-07-14 ENCOUNTER — HOSPITAL ENCOUNTER (OUTPATIENT)
Dept: RADIOLOGY | Facility: HOSPITAL | Age: 50
Discharge: HOME | End: 2020-07-14
Attending: NURSE PRACTITIONER
Payer: COMMERCIAL

## 2020-07-14 ENCOUNTER — TELEPHONE (OUTPATIENT)
Dept: UROLOGY | Facility: CLINIC | Age: 50
End: 2020-07-14

## 2020-07-14 DIAGNOSIS — N28.89 RENAL MASS, RIGHT: Primary | ICD-10-CM

## 2020-07-14 DIAGNOSIS — N28.89 OTHER SPECIFIED DISORDERS OF KIDNEY AND URETER: ICD-10-CM

## 2020-07-14 PROCEDURE — 74183 MRI ABD W/O CNTR FLWD CNTR: CPT | Mod: 26,,, | Performed by: RADIOLOGY

## 2020-07-14 PROCEDURE — 25500020 PHARM REV CODE 255

## 2020-07-14 PROCEDURE — 74183 MRI ABDOMEN W WO CONTRAST: ICD-10-PCS | Mod: 26,,, | Performed by: RADIOLOGY

## 2020-07-14 PROCEDURE — A9585 GADOBUTROL INJECTION: HCPCS

## 2020-07-14 PROCEDURE — 74183 MRI ABD W/O CNTR FLWD CNTR: CPT | Mod: TC

## 2020-07-14 RX ORDER — GADOBUTROL 604.72 MG/ML
INJECTION INTRAVENOUS
Status: COMPLETED
Start: 2020-07-14 | End: 2020-07-14

## 2020-07-14 RX ADMIN — GADOBUTROL 8 ML: 604.72 INJECTION INTRAVENOUS at 06:07

## 2020-07-14 NOTE — TELEPHONE ENCOUNTER
Patient scheduled incorrectly with NP. Patient voiced understanding but stated that he needed to be seen very soon. Next available appointment in [facility] is in August, patient requested to look elsewhere. Soonest found in [place], patient scheduled with Dr. Tomlin 7/21/20

## 2020-07-21 ENCOUNTER — OFFICE VISIT (OUTPATIENT)
Dept: UROLOGY | Facility: CLINIC | Age: 50
End: 2020-07-21
Payer: COMMERCIAL

## 2020-07-21 VITALS
DIASTOLIC BLOOD PRESSURE: 86 MMHG | BODY MASS INDEX: 26.43 KG/M2 | HEIGHT: 72 IN | SYSTOLIC BLOOD PRESSURE: 119 MMHG | WEIGHT: 195.13 LBS | HEART RATE: 61 BPM

## 2020-07-21 DIAGNOSIS — N28.89 RENAL MASS, RIGHT: ICD-10-CM

## 2020-07-21 LAB
BILIRUB SERPL-MCNC: NORMAL MG/DL
BLOOD URINE, POC: NORMAL
CLARITY, POC UA: CLEAR
COLOR, POC UA: YELLOW
GLUCOSE UR QL STRIP: NORMAL
KETONES UR QL STRIP: NORMAL
LEUKOCYTE ESTERASE URINE, POC: NORMAL
NITRITE, POC UA: NORMAL
PH, POC UA: 7
PROTEIN, POC: NORMAL
SPECIFIC GRAVITY, POC UA: 1.01
UROBILINOGEN, POC UA: 0.2

## 2020-07-21 PROCEDURE — 99214 PR OFFICE/OUTPT VISIT, EST, LEVL IV, 30-39 MIN: ICD-10-PCS | Mod: 25,S$GLB,, | Performed by: UROLOGY

## 2020-07-21 PROCEDURE — 81002 POCT URINE DIPSTICK WITHOUT MICROSCOPE: ICD-10-PCS | Mod: S$GLB,,, | Performed by: UROLOGY

## 2020-07-21 PROCEDURE — 99214 OFFICE O/P EST MOD 30 MIN: CPT | Mod: 25,S$GLB,, | Performed by: UROLOGY

## 2020-07-21 PROCEDURE — 99999 PR PBB SHADOW E&M-EST. PATIENT-LVL III: ICD-10-PCS | Mod: PBBFAC,,, | Performed by: UROLOGY

## 2020-07-21 PROCEDURE — 3008F PR BODY MASS INDEX (BMI) DOCUMENTED: ICD-10-PCS | Mod: CPTII,S$GLB,, | Performed by: UROLOGY

## 2020-07-21 PROCEDURE — 99999 PR PBB SHADOW E&M-EST. PATIENT-LVL III: CPT | Mod: PBBFAC,,, | Performed by: UROLOGY

## 2020-07-21 PROCEDURE — 81002 URINALYSIS NONAUTO W/O SCOPE: CPT | Mod: S$GLB,,, | Performed by: UROLOGY

## 2020-07-21 PROCEDURE — 3008F BODY MASS INDEX DOCD: CPT | Mod: CPTII,S$GLB,, | Performed by: UROLOGY

## 2020-07-21 NOTE — PROGRESS NOTES
UROLOGY [facility]  7 21 20    Cc R renal mass    Age 49, was worried about hematochezia he had for 4 days in a row, and sought medical attention. During the diagnostic w/u, they did a CT scan with and without contrast. This showed a renal mass 3.3 cm in diameter towards the lower pole of the kidney. A confirmatory MRI measured the solid mass at the same size and found no fat content. There are no abnormal lymph nodes.     Since the original presentation of the hematochezia, it has ceased.     Says voids well, although with reduced stream force; has nocturia x 0-1. No urgency or abnormal frequency.   No abdominal pains.     PMH    Surgical:  has a past surgical history that includes Nasal septum surgery; Upper gastrointestinal endoscopy; Hernia repair (Right); and Colonoscopy (N/A, 12/16/2015). circumcised    Medical:  has a past medical history of Colon polyp, GERD (gastroesophageal reflux disease), Giardial enteritis, H. pylori infection, and Prostatitis.    Familial: father had prostate ca, paternal grandfather unknown cancer 1977, no fh of kidney stones    Social: , lives in [place]. Works for [employer], sports medicine, and     Meds:   Current Outpatient Medications on File Prior to Visit   Medication Sig Dispense Refill    multivitamin (THERAGRAN) per tablet Take 1 tablet by mouth once daily.      B INFANTIS/B ANI/B SASHA/B BIFID (PROBIOTIC 4X ORAL) Take by mouth once daily.       REVIEW OF SYSTEMS  GENERAL: No complaints of fatigue. No headaches or dizzy spells.   HEENT: vision preserved. Sinuses: No complaints.   CARDIOPULMONARY: No swelling of the legs; no chest pain. No shortness of breath.   GASTROINTESTINAL: has heartburn. occasional diarrhea; denies constipation, no blood or mucus in stools.   GENITOURINARY: Denies dysuria, bleeding or incontinence.   MUSCULOSKELETAL: No arthritic complaints such as pain or stiffness.   PSYCHIATRIC: No history of depression or anxiety.    ENDOCRINOLOGIC: No reports of sweating, cold or heat intolerance. No polyuria or polydipsia.   NEUROLOGICAL: No dizziness, syncope, paralysis  LYMPHATICS: No enlarged nodes. No history of splenectomy.  ==    Pt alert, oriented, no distress  HEENT: wnl.  Neck: supple, no JVD, no lymphadenopathy  Chest: CV NSR  Lungs: normal chest expansion, no labored breathing  Abdomen flat, markedly athletic, nontender, no organomegaly, no masses.  No hernias  Penis circumcised, meatus nl  Testes nl, epi nl, scrotum nl  Extremities: no edema, peripheral pulses nl  Neuro: preserved    IMP    Solid renal mass, R side, 3.3 cm in diameter, located towards the lower pole but actually situated near the core of the kidney. I suspect will need radical nephrectomy.   Because pt is extremely athletic, would try to minimize muscle damage and robotic option would be ideal.   I will refer to dr rock hoffman in [place]; pt lives in [place].

## 2020-07-21 NOTE — LETTER
July 21, 2020      Richard Stark, [facility]  [address] [facility] [address]           [facility]  [address] [place] [address]  Phone: [phone]  Fax: [phone]          Patient: Valentin Johnson   MR Number: [id number]   YOB: 1970   Date of Visit: 7/21/2020       Dear Richard Stark:    Thank you for referring Valentin Johnson to me for evaluation. Attached you will find relevant portions of my assessment and plan of care.    If you have questions, please do not hesitate to call me. I look forward to following Valentin Johnson along with you.    Sincerely,    Asad Tomlin MD    Enclosure  CC:  No Recipients    If you would like to receive this communication electronically, please contact [email or web address] or [phone] to request more information on [employer] access.    For providers and/or their staff who would like to refer a patient to [facility], please contact us through our one-stop-shop provider referral line, [facility] , at [phone].    If you feel you have received this communication in error or would no longer like to receive these types of communications, please e-mail [email or web address]

## 2020-07-22 ENCOUNTER — TELEPHONE (OUTPATIENT)
Dept: UROLOGY | Facility: CLINIC | Age: 50
End: 2020-07-22

## 2020-07-22 NOTE — TELEPHONE ENCOUNTER
Would make it clear that it is an office copay either way whether virtual or in person, and while VV gets him in sooner and is more convenient, if we proceed with surgery will recommend in person preop prior, which is another visit  So for him, may recommend the in person slot

## 2020-07-22 NOTE — TELEPHONE ENCOUNTER
"Spoke with patient informed him referral placed by . patient is not happy about paying another copay he states "I should have scheduled with  from the beginning." but patient is in agreement for a virtual visit. Virtual visit scheduled on Monday 7/27 at 2pm with . patient is active on [email or web address] and reviewed virtual visit check in instructions with patient.  "

## 2020-07-22 NOTE — TELEPHONE ENCOUNTER
Spoke with patient went over recommendations below. Patient is in agreement to a in person visit. Appointment scheduled on 7/28 at 9:15am with . patient verbally voiced understanding.

## 2020-07-22 NOTE — TELEPHONE ENCOUNTER
Patient with renal mass referred by Dr Tomlin to discuss robotic surgery.    Can book in for initial consult Tuesday 7/28 at 0915 in office or Can do VV Monday afternoon if prefers

## 2020-07-28 ENCOUNTER — OFFICE VISIT (OUTPATIENT)
Dept: UROLOGY | Facility: CLINIC | Age: 50
End: 2020-07-28
Payer: COMMERCIAL

## 2020-07-28 VITALS
SYSTOLIC BLOOD PRESSURE: 135 MMHG | BODY MASS INDEX: 26.55 KG/M2 | WEIGHT: 196 LBS | HEART RATE: 69 BPM | HEIGHT: 72 IN | DIASTOLIC BLOOD PRESSURE: 89 MMHG | TEMPERATURE: 99 F

## 2020-07-28 DIAGNOSIS — N28.89 RIGHT RENAL MASS: Primary | ICD-10-CM

## 2020-07-28 DIAGNOSIS — Z12.5 PROSTATE CANCER SCREENING: ICD-10-CM

## 2020-07-28 DIAGNOSIS — Z03.818 ENCOUNTER FOR OBSERVATION FOR SUSPECTED EXPOSURE TO OTHER BIOLOGICAL AGENTS RULED OUT: ICD-10-CM

## 2020-07-28 PROCEDURE — 99354 PR PROLONGED SVC, OUPT, 1ST HR: ICD-10-PCS | Mod: S$GLB,,, | Performed by: UROLOGY

## 2020-07-28 PROCEDURE — 3008F PR BODY MASS INDEX (BMI) DOCUMENTED: ICD-10-PCS | Mod: CPTII,S$GLB,, | Performed by: UROLOGY

## 2020-07-28 PROCEDURE — 81002 POCT URINE DIPSTICK WITHOUT MICROSCOPE: ICD-10-PCS | Mod: S$GLB,,, | Performed by: UROLOGY

## 2020-07-28 PROCEDURE — 99354 PR PROLONGED SVC, OUPT, 1ST HR: CPT | Mod: S$GLB,,, | Performed by: UROLOGY

## 2020-07-28 PROCEDURE — 99999 PR PBB SHADOW E&M-EST. PATIENT-LVL IV: CPT | Mod: PBBFAC,,, | Performed by: UROLOGY

## 2020-07-28 PROCEDURE — 99215 OFFICE O/P EST HI 40 MIN: CPT | Mod: S$GLB,,, | Performed by: UROLOGY

## 2020-07-28 PROCEDURE — 99215 PR OFFICE/OUTPT VISIT, EST, LEVL V, 40-54 MIN: ICD-10-PCS | Mod: S$GLB,,, | Performed by: UROLOGY

## 2020-07-28 PROCEDURE — 81002 URINALYSIS NONAUTO W/O SCOPE: CPT | Mod: S$GLB,,, | Performed by: UROLOGY

## 2020-07-28 PROCEDURE — 99999 PR PBB SHADOW E&M-EST. PATIENT-LVL IV: ICD-10-PCS | Mod: PBBFAC,,, | Performed by: UROLOGY

## 2020-07-28 PROCEDURE — 3008F BODY MASS INDEX DOCD: CPT | Mod: CPTII,S$GLB,, | Performed by: UROLOGY

## 2020-07-28 RX ORDER — CIPROFLOXACIN 2 MG/ML
400 INJECTION, SOLUTION INTRAVENOUS
Status: CANCELLED | OUTPATIENT
Start: 2020-07-28

## 2020-07-28 NOTE — Clinical Note
Sorry for chart delay - I saw your eval of this healthy gentleman planning renal mass resection vs nephrectomy planned for next week.   His ekg has been reviewed by anesthesia as his bradycardia is expected.   Will CC you with result of surgery.

## 2020-07-28 NOTE — PROGRESS NOTES
[facility] New Patient/H&P:    Valentin Johnson is a 49 y.o. male who presents for evaluation of right renal mass    He was seen on 7/10 by MONIKA Stark in family medicine for LLQ pain and blood in stool with concerns for diverticulitis and lower GI bleed  CT ordered to workup and advised to follow up with GI    CT abdomen/pelvis w/wo contrast 7/10/20: 3.3 cm mass lower pole of the right kidney not fully characterized but not appearing a simple cyst with internal septations in could be complex cyst or solid mas. Prostate is 4cm. Diverticulosis without diverticulitis.  MRI ordered to further characterize as review of prior imaging noted 1/2018 ct only of L spine and 1/2016 abdominal CT noted to have no mass    7/13: Pt bleeding has subsided and he has not made an appointment with GI yet.  7/14 MRI w/wo contrast: In the lower half of the right kidney is a 3.3 x 2.7 cm mildly irregular solid mass.  This does not display signal dropout on out of phase imaging and therefore does not contain significant fat.  It is not MRI signal of a cyst.  There is only mild contrast enhancement out at 3 minutes.  It was not present on the CT of January 20, 2016.  This is most likely a renal neoplasm. Other masses are not seen in either kidney which show normal contrast enhancement.  Retroperitoneal adenopathy or masses are not seen.    He was seen by Dr Tomlin on 7/21 noting the mass at right lower pole but closer to the core of the kidney, and referred to discussed minimally invasive surgery  Noted at that visit to have fam hx CaP in father, and voids well, although with reduced stream force; has nocturia x 0-1. No urgency or abnormal frequency.   Uologic Hx includes vasectomy in 2012 with Dr Tomlin, empiric treatment for prostatitis in 2015 when presenting with obstructive LUTS, and trus/uroflow in 2018 to evaluate.  2015: nocturia and poor voiding stream with some hesitancy, intermittency and less pressure on his  stream.  2018: weak stream, frequency, nocturia. PVR 0cc -- trus 25.6g no medial lobe, and uroflow obstructed with voided volume 243cc with Qa/Qm 4/6cc/s and restricted curve  Recommended cysto but did not schedule    He presents today noting  Cr 1.1/eGFR >60 on 7/10/20; psa 0.94 in 2018  Sees GI tomorrow, Dr Velasquez. Has had prior endoscopy and has had brbpr before but not for that sig length of time.  Non/never smoker  No hematuria    Still some weak stream  Has gotten better about timed voiding  AUA SS:  6/1 (2:  Weak stream; 1:  Emptying, intermittency, straining, sleeping)  Lumbar spine imaging in 2018 on clear, however imaging for 2012 in 2016 reviewed without mass at that time.  Personal review imaging demonstrates 2.9 x 3.4 cm right mid to lower pole central endophytic mass abutting hilum  Family history of prostate ca in father, no fam Hx RCC/UC    Past Medical History:   Diagnosis Date    Colon polyp     GERD (gastroesophageal reflux disease)     Giardial enteritis     H. pylori infection 2015    Prostatitis        Past Surgical History:   Procedure Laterality Date    CIRCUMCISION      COLONOSCOPY N/A 12/16/2015    Procedure: COLONOSCOPY;  Surgeon: Anton Matos MD;  Location: [facility] ENDO;  Service: Endoscopy;  Laterality: N/A;    COLONOSCOPY N/A 8/14/2020    Procedure: COLONOSCOPY;  Surgeon: Christian Velasquez MD;  Location: [facility] ENDO;  Service: Endoscopy;  Laterality: N/A;    HERNIA REPAIR Right     inguinal    NASAL SEPTUM SURGERY      deviated septum, chronic sinusitis    UPPER GASTROINTESTINAL ENDOSCOPY         Family History   Problem Relation Age of Onset    Hypertension Mother     Hypertension Father     Macular degeneration Father     Prostate cancer Father 70    Migraines Sister     Hypertension Brother     Obesity Brother     Macular degeneration Maternal Grandmother     Stroke Maternal Grandmother     Cancer Paternal Grandfather         unknown    Glaucoma Neg Hx      Retinal detachment Neg Hx     Strabismus Neg Hx        Social History     Socioeconomic History    Marital status:      Spouse name: Not on file    Number of children: Not on file    Years of education: Not on file    Highest education level: Not on file   Occupational History    Occupation: sports medicine    Occupation:      Employer: [employer]     Comment: [employer]   Social Needs    Financial resource strain: Somewhat hard    Food insecurity     Worry: Never true     Inability: Never true    Transportation needs     Medical: No     Non-medical: No   Tobacco Use    Smoking status: Never Smoker    Smokeless tobacco: Never Used   Substance and Sexual Activity    Alcohol use: Yes     Frequency: 2-4 times a month     Drinks per session: 1 or 2     Binge frequency: Less than monthly     Comment: seldom    Drug use: No    Sexual activity: Yes     Partners: Female   Lifestyle    Physical activity     Days per week: 4 days     Minutes per session: 50 min    Stress: Only a little   Relationships    Social connections     Talks on phone: More than three times a week     Gets together: Once a week     Attends Nondenominational service: Not on file     Active member of club or organization: Yes     Attends meetings of clubs or organizations: More than 4 times per year     Relationship status:    Other Topics Concern    Not on file   Social History Narrative    Not on file       Review of patient's allergies indicates:   Allergen Reactions    Penicillins      Childhood allergy. Reaction unknown.       Medications Reviewed: see MAR    ROS:    Constitutional: denies fevers, chills, night sweats, fatigue, malaise  Respiratory: negative for cough, shortness of breath, wheezing, dyspnea.  Cardiovascular: negative for chest pain, varicose veins, ankle swelling, palpitations, syncope.  GI: negative for abdominal pain, heartburn, indigestion, nausea, vomiting,  constipation, diarrhea, blood in stool.   Urology: as noted above in HPI  Endocrinology: negative for cold intolerance, excessive thirst, not feeling tired/sluggish, no heat intolerance.   Hematology/Lymph: negative for easy bleeding, easy bruising, swollen glands.  Musculoskeletal: negative for back pain, joint pain, joint swelling, neck pain.  Allergy-Immunology: negative for seasonal allergies, negative for unusual infections.   Skin: negative for boils, breast lumps, hives, itching, rash.   Neurology: negative for, dizziness, headache, tingling/numbness, tremors.   Psych: satisfied with life; negative for, anxiety, depression, suicidal thoughts.     PHYSICAL EXAM:    Vitals:    07/28/20 0916   BP: 135/89   Pulse: 69   Temp: 98.6 °F (37 °C)     Body mass index is 26.58 kg/m². Weight: 88.9 kg (195 lb 15.8 oz) Height: 6' (182.9 cm)       General: Alert, cooperative, no distress, appears stated age  Head: Normocephalic, without obvious abnormality, atraumatic  Neck: no masses, no thyromegaly, no lymphadenopathy  Eyes: PERRL, conjunctiva/corneas clear  Lungs: Respirations unlabored, normal effort, no accessory muscle use  CV: Warm and well perfused extremities  Abdomen: Soft, non-tender, no CVA tenderness, no hepatosplenomegaly, no hernia  Penis: phallus normal, circumcised, well cared for, no plaques or lesions.   Scrotum: no cysts, no lesions, no rash, no hydrocele.   Epididymes: normal, nontender, symmetrical, no masses or cysts.   Testes: normal, both descended, no masses.   Urethra: palpably normal with orthotopic meatus of normal size    NGUYEN: normal sphincter tone, no masses, no hemmorrhoids   PROSTATE: 15-20g, no nodules, non-tender, symmetrical.   Extremities: Extremities normal, atraumatic, no cyanosis or edema  Skin: Normal color, texture, and turgor, no rashes or lesions  Psych: Appropriate, well oriented, normal affect, normal mood  Neuro: Non-focal      Assessment/Diagnosis:    1. Right renal mass  POCT  URINE DIPSTICK WITHOUT MICROSCOPE    Diet NPO    Case Request Operating Room: ROBOTIC NEPHRECTOMY, ROBOTIC NEPHRECTOMY, PARTIAL    Admit to Inpatient    Insert peripheral IV    Reason for No Pharmacological VTE Prophylaxis    Place MILADYS hose    Place sequential compression device    Comprehensive metabolic panel    CBC auto differential    Protime-INR    Urinalysis    Urine culture    Prepare RBC 2 Units; preop    CANCELED: Type & Screen    CANCELED: EKG 12-lead   2. Prostate cancer screening  PSA, Screening       Plans:  In review of imaging with the patient, I did delineate his concerning solid enhancing right mid to lower pole renal mass  We did discuss that approximately 80% of enhancing renal masses represent a renal cell carcinoma, and surgical excision of these masses is the preferred treatment strategy, with nephron-sparing approach/partial nephrectomy preferred for small renal masses < 3-4 cm. We did discuss alternative options such as active surveillance, and ablative techniques such as percutaneous or laparoscopic cryoablation. In general, these alternative therapies are reserved for patients with increased comorbidities or at higher surgical risk. We also discussed higher recurrence rates with ablative vs. extirpative therapy, and that resection of T1a masses has excellent oncologic outcomes and is 90%+ curative with no further adjuvant therapies necessary. In discussing that the goal of partial nephrectomies (vs. Radical nephrectomies) is to maximize long-term renal function, they are associated with a slightly increased risk of nicholas-procedural complications, which we discussed in detail.     We did however extensively review his imaging denoting the central endophytic nature of this mass abutting the hilum and collecting system.  Certainly given the challenging anatomy of this mass, partial nephrectomy may not be possible, thus necessitating radical nephrectomy and we did have an extensive risk benefit  discussion about both options.  Given location of the mass, we discussed the use of intraoperative ultrasound to help localize and determine margins, and if reasonable to resect without compromising hilar collecting system structures, would attempt partial nephrectomy, which would likely be more of a lower pole heminephrectomy.  Of course review the anatomic challenges to this, and prepared the patient for the likely possibility of radical nephrectomy, though will take efforts as above at nerve-sparing approach if safe/possible to do so.    After thorough discussion of his options, he elected to proceed with right robotic partial vs radical nephrectomy.   He understands the greater than 50-60% chance conversion to radical nephrectomy given the location of mass and anatomic considerations.     We did discuss risks and benefits of the procedure including but not limited to pain, infection, bleeding, scarring, injury to surrounding intra-abdominal structures, vascular injury, need for removal of entire kidney, fistula or pseudoaneurysm, declining renal function, need for blood transfusion, need to convert to open procedure, urine leak or urinary fistula. We discussed the need for clamping of the renal artery for warm ischemia time, noting that the warm ischemia time will be kept to a minimum, but the longer the warm ischemia time the more potential for renal damage. We discussed the postoperative course in detail including hospital stay, Wilson catheter, DYLAN drain.  Also discussed the differences in risk in recovery for radical nephrectomy, and all questions answered, and ppropriate informed consent was obtained.  A reviewed overnight stay in the hospital, and the potential for 2 nights in the hospital of partial nephrectomy is performed to monitor for bleeding and drain outputs, though in a young healthy patient is also possible to be discharged home on postop day 1 if there were no complications    Right robot assisted  laparoscopic partial versus radical nephrectomy on 8/26/20  PAT 10-12d prior.  Patient will have labs done with [facility] before preop visit, so will just need his urine, type and cross, and EKG at preop.    100 min spent in encounter, over half in counseling, and including review of prior workup and complex management discussion partial versus radical nephrectomy as above

## 2020-07-29 ENCOUNTER — CLINICAL SUPPORT (OUTPATIENT)
Dept: INTERNAL MEDICINE | Facility: CLINIC | Age: 50
End: 2020-07-29
Payer: COMMERCIAL

## 2020-07-29 ENCOUNTER — OFFICE VISIT (OUTPATIENT)
Dept: GASTROENTEROLOGY | Facility: CLINIC | Age: 50
End: 2020-07-29
Payer: COMMERCIAL

## 2020-07-29 VITALS
SYSTOLIC BLOOD PRESSURE: 138 MMHG | HEART RATE: 69 BPM | WEIGHT: 194 LBS | DIASTOLIC BLOOD PRESSURE: 85 MMHG | BODY MASS INDEX: 26.31 KG/M2

## 2020-07-29 DIAGNOSIS — N28.89 RIGHT RENAL MASS: ICD-10-CM

## 2020-07-29 DIAGNOSIS — Z12.5 PROSTATE CANCER SCREENING: ICD-10-CM

## 2020-07-29 DIAGNOSIS — Z01.818 PRE-OP TESTING: ICD-10-CM

## 2020-07-29 DIAGNOSIS — Z00.00 ANNUAL PHYSICAL EXAM: Primary | ICD-10-CM

## 2020-07-29 DIAGNOSIS — R93.5 ABNORMAL CT OF THE ABDOMEN: ICD-10-CM

## 2020-07-29 DIAGNOSIS — R10.32 LEFT LOWER QUADRANT PAIN: Primary | ICD-10-CM

## 2020-07-29 DIAGNOSIS — K62.5 RECTAL BLEEDING: ICD-10-CM

## 2020-07-29 LAB
25(OH)D3+25(OH)D2 SERPL-MCNC: 50 NG/ML (ref 30–96)
ALBUMIN SERPL BCP-MCNC: 4.7 G/DL (ref 3.5–5.2)
ALP SERPL-CCNC: 63 U/L (ref 55–135)
ALT SERPL W/O P-5'-P-CCNC: 22 U/L (ref 10–44)
ANION GAP SERPL CALC-SCNC: 9 MMOL/L (ref 8–16)
AST SERPL-CCNC: 19 U/L (ref 10–40)
BASOPHILS # BLD AUTO: 0.03 K/UL (ref 0–0.2)
BASOPHILS NFR BLD: 0.6 % (ref 0–1.9)
BILIRUB SERPL-MCNC: 0.6 MG/DL (ref 0.1–1)
BUN SERPL-MCNC: 15 MG/DL (ref 6–20)
CALCIUM SERPL-MCNC: 9.8 MG/DL (ref 8.7–10.5)
CHLORIDE SERPL-SCNC: 103 MMOL/L (ref 95–110)
CHOLEST SERPL-MCNC: 151 MG/DL (ref 120–199)
CHOLEST/HDLC SERPL: 2.9 {RATIO} (ref 2–5)
CO2 SERPL-SCNC: 29 MMOL/L (ref 23–29)
COMPLEXED PSA SERPL-MCNC: 0.67 NG/ML (ref 0–4)
CREAT SERPL-MCNC: 1.1 MG/DL (ref 0.5–1.4)
DIFFERENTIAL METHOD: NORMAL
EOSINOPHIL # BLD AUTO: 0.1 K/UL (ref 0–0.5)
EOSINOPHIL NFR BLD: 2.6 % (ref 0–8)
ERYTHROCYTE [DISTWIDTH] IN BLOOD BY AUTOMATED COUNT: 14.2 % (ref 11.5–14.5)
EST. GFR  (AFRICAN AMERICAN): >60 ML/MIN/1.73 M^2
EST. GFR  (NON AFRICAN AMERICAN): >60 ML/MIN/1.73 M^2
ESTIMATED AVG GLUCOSE: 114 MG/DL (ref 68–131)
GLUCOSE SERPL-MCNC: 93 MG/DL (ref 70–110)
HBA1C MFR BLD HPLC: 5.6 % (ref 4–5.6)
HCT VFR BLD AUTO: 46.9 % (ref 40–54)
HDLC SERPL-MCNC: 52 MG/DL (ref 40–75)
HDLC SERPL: 34.4 % (ref 20–50)
HGB BLD-MCNC: 15.7 G/DL (ref 14–18)
INR PPP: 1 (ref 0.8–1.2)
LDLC SERPL CALC-MCNC: 69.8 MG/DL (ref 63–159)
LYMPHOCYTES # BLD AUTO: 1.6 K/UL (ref 1–4.8)
LYMPHOCYTES NFR BLD: 30.1 % (ref 18–48)
MCH RBC QN AUTO: 29.4 PG (ref 27–31)
MCHC RBC AUTO-ENTMCNC: 33.5 G/DL (ref 32–36)
MCV RBC AUTO: 88 FL (ref 82–98)
MONOCYTES # BLD AUTO: 0.4 K/UL (ref 0.3–1)
MONOCYTES NFR BLD: 7.2 % (ref 4–15)
NEUTROPHILS # BLD AUTO: 3.2 K/UL (ref 1.8–7.7)
NEUTROPHILS NFR BLD: 59.5 % (ref 38–73)
NONHDLC SERPL-MCNC: 99 MG/DL
PLATELET # BLD AUTO: 314 K/UL (ref 150–350)
PMV BLD AUTO: 10.3 FL (ref 9.2–12.9)
POTASSIUM SERPL-SCNC: 4 MMOL/L (ref 3.5–5.1)
PROT SERPL-MCNC: 7.3 G/DL (ref 6–8.4)
PROTHROMBIN TIME: 10.2 SEC (ref 9–12.5)
RBC # BLD AUTO: 5.34 M/UL (ref 4.6–6.2)
SODIUM SERPL-SCNC: 141 MMOL/L (ref 136–145)
TRIGL SERPL-MCNC: 146 MG/DL (ref 30–150)
TSH SERPL DL<=0.005 MIU/L-ACNC: 1.39 UIU/ML (ref 0.4–4)
WBC # BLD AUTO: 5.41 K/UL (ref 3.9–12.7)

## 2020-07-29 PROCEDURE — 99999 PR PBB SHADOW E&M-EST. PATIENT-LVL III: CPT | Mod: PBBFAC,,, | Performed by: INTERNAL MEDICINE

## 2020-07-29 PROCEDURE — 99244 PR OFFICE CONSULTATION,LEVEL IV: ICD-10-PCS | Mod: S$GLB,,, | Performed by: INTERNAL MEDICINE

## 2020-07-29 PROCEDURE — 84153 ASSAY OF PSA TOTAL: CPT

## 2020-07-29 PROCEDURE — 80061 LIPID PANEL: CPT

## 2020-07-29 PROCEDURE — 85610 PROTHROMBIN TIME: CPT | Mod: PO

## 2020-07-29 PROCEDURE — 99999 PR PBB SHADOW E&M-EST. PATIENT-LVL III: ICD-10-PCS | Mod: PBBFAC,,, | Performed by: INTERNAL MEDICINE

## 2020-07-29 PROCEDURE — 84443 ASSAY THYROID STIM HORMONE: CPT

## 2020-07-29 PROCEDURE — 99244 OFF/OP CNSLTJ NEW/EST MOD 40: CPT | Mod: S$GLB,,, | Performed by: INTERNAL MEDICINE

## 2020-07-29 PROCEDURE — 85025 COMPLETE CBC W/AUTO DIFF WBC: CPT | Mod: PO

## 2020-07-29 PROCEDURE — 82306 VITAMIN D 25 HYDROXY: CPT

## 2020-07-29 PROCEDURE — 83036 HEMOGLOBIN GLYCOSYLATED A1C: CPT

## 2020-07-29 PROCEDURE — 80053 COMPREHEN METABOLIC PANEL: CPT | Mod: PO

## 2020-07-29 NOTE — PROGRESS NOTES
Subjective:       Patient ID: Valentin Johnson is a 49 y.o. male.    This patient is new to me.  Referring provider:  Dr. Castañeda for rectal bleeding.  He was seen by Dr. Matos in the past but not in the last 3 years.      Chief Complaint: Rectal bleeding    Patient seen for rectal bleeding, onset recent, moderate to copious amount, bright red in color, with associated signs/symptoms including vague abdominal discomfort and one episode of diarrhea which has now resolved, and alleviating/exacerbating factors including none.  He had CT scan for the above and had an incidentally discovered right kidney lesion for which operative intervention is planned per notes from Dr. Gomez which were independently reviewed.  He denies weight loss or upper GI complaints.  No other GI issues currently.  He has some left sided abdominal discomfort, the description of which sounds more musculoskeletal.      Review of Systems   Constitutional: Negative for chills, fatigue and fever.   HENT: Negative for trouble swallowing.    Respiratory: Negative for cough, shortness of breath and wheezing.    Cardiovascular: Negative for chest pain and palpitations.   Gastrointestinal: Positive for blood in stool. Negative for abdominal pain, constipation, diarrhea, nausea and vomiting.   Musculoskeletal: Negative for arthralgias and myalgias.   Integumentary:  Negative for color change and rash.   Neurological: Negative for dizziness, weakness and numbness.   Psychiatric/Behavioral: Negative for confusion. The patient is not nervous/anxious.    All other systems reviewed and are negative.        Objective:       Vitals:    07/29/20 1402   BP: 138/85   Pulse: 69   Weight: 88 kg (194 lb 0.1 oz)       Physical Exam  Constitutional:       Appearance: He is well-developed.   HENT:      Head: Normocephalic and atraumatic.   Eyes:      General: No scleral icterus.     Pupils: Pupils are equal, round, and reactive to light.   Neck:       Musculoskeletal: Normal range of motion and neck supple.      Thyroid: No thyromegaly.   Cardiovascular:      Rate and Rhythm: Normal rate and regular rhythm.      Heart sounds: No murmur.   Pulmonary:      Effort: Pulmonary effort is normal.      Breath sounds: Normal breath sounds. No wheezing.   Abdominal:      General: Bowel sounds are normal. There is no distension.      Palpations: Abdomen is soft.      Tenderness: There is no abdominal tenderness.   Lymphadenopathy:      Cervical: No cervical adenopathy.   Skin:     General: Skin is warm and dry.      Findings: No erythema or rash.   Neurological:      Mental Status: He is alert and oriented to person, place, and time.   Psychiatric:         Behavior: Behavior normal.           Lab Results   Component Value Date    WBC 5.41 07/29/2020    HGB 15.7 07/29/2020    HCT 46.9 07/29/2020    MCV 88 07/29/2020     07/29/2020         CMP  Sodium   Date Value Ref Range Status   07/29/2020 141 136 - 145 mmol/L Final     Potassium   Date Value Ref Range Status   07/29/2020 4.0 3.5 - 5.1 mmol/L Final     Chloride   Date Value Ref Range Status   07/29/2020 103 95 - 110 mmol/L Final     CO2   Date Value Ref Range Status   07/29/2020 29 23 - 29 mmol/L Final     Glucose   Date Value Ref Range Status   07/29/2020 93 70 - 110 mg/dL Final     BUN, Bld   Date Value Ref Range Status   07/29/2020 15 6 - 20 mg/dL Final     Creatinine   Date Value Ref Range Status   07/29/2020 1.1 0.5 - 1.4 mg/dL Final     Calcium   Date Value Ref Range Status   07/29/2020 9.8 8.7 - 10.5 mg/dL Final     Total Protein   Date Value Ref Range Status   07/29/2020 7.3 6.0 - 8.4 g/dL Final     Albumin   Date Value Ref Range Status   07/29/2020 4.7 3.5 - 5.2 g/dL Final     Total Bilirubin   Date Value Ref Range Status   07/29/2020 0.6 0.1 - 1.0 mg/dL Final     Comment:     For infants and newborns, interpretation of results should be based  on gestational age, weight and in agreement with  clinical  observations.  Premature Infant recommended reference ranges:  Up to 24 hours.............<8.0 mg/dL  Up to 48 hours............<12.0 mg/dL  3-5 days..................<15.0 mg/dL  6-29 days.................<15.0 mg/dL       Alkaline Phosphatase   Date Value Ref Range Status   07/29/2020 63 55 - 135 U/L Final     AST   Date Value Ref Range Status   07/29/2020 19 10 - 40 U/L Final     ALT   Date Value Ref Range Status   07/29/2020 22 10 - 44 U/L Final     Anion Gap   Date Value Ref Range Status   07/29/2020 9 8 - 16 mmol/L Final     eGFR if    Date Value Ref Range Status   07/29/2020 >60 >60 mL/min/1.73 m^2 Final     eGFR if non    Date Value Ref Range Status   07/29/2020 >60 >60 mL/min/1.73 m^2 Final     Comment:     Calculation used to obtain the estimated glomerular filtration  rate (eGFR) is the CKD-EPI equation.          CT scan was independently visualized and reviewed by me and showed findings as above in HPI.      Old records from Dr. Gomez reviewed and are as summarized above in the HPI.    Assessment:       1. Left lower quadrant pain    2. Rectal bleeding    3. Abnormal CT of the abdomen        Plan:       1.  Continue current diet   2.  Schedule colonoscopy for rectal bleeding  3.  Keep follow up with urology  4.  Further recommendations to follow after above.  5.  Communication will be sent to the referring provider, Dr. Castañeda regarding my assessment and plan on this patient via EPIC.

## 2020-07-29 NOTE — LETTER
July 29, 2020      Richard Stark, MELONIE  [address]  [facility] [address]           [facility]  [address], [address]  [facility] [address]  Phone: [phone]          Patient: Valentin Johnson   MR Number: [id number]   YOB: 1970   Date of Visit: 7/29/2020       Dear Richard Stark:    Thank you for referring Valentin Johnson to me for evaluation. Attached you will find relevant portions of my assessment and plan of care.    If you have questions, please do not hesitate to call me. I look forward to following Valentin Johnson along with you.    Sincerely,    Christian Velasquez MD    Enclosure  CC:  No Recipients    If you would like to receive this communication electronically, please contact [email or web address] or [phone] to request more information on [employer] Link access.    For providers and/or their staff who would like to refer a patient to [facility], please contact us through our one-stop-shop provider referral line, [facility], at [phone].    If you feel you have received this communication in error or would no longer like to receive these types of communications, please e-mail [email or web address]

## 2020-08-04 ENCOUNTER — OFFICE VISIT (OUTPATIENT)
Dept: FAMILY MEDICINE | Facility: CLINIC | Age: 50
End: 2020-08-04
Payer: COMMERCIAL

## 2020-08-04 VITALS
HEIGHT: 72 IN | DIASTOLIC BLOOD PRESSURE: 86 MMHG | HEART RATE: 73 BPM | TEMPERATURE: 98 F | WEIGHT: 195.13 LBS | OXYGEN SATURATION: 97 % | BODY MASS INDEX: 26.43 KG/M2 | SYSTOLIC BLOOD PRESSURE: 120 MMHG

## 2020-08-04 DIAGNOSIS — Z00.00 WELLNESS EXAMINATION: Primary | ICD-10-CM

## 2020-08-04 PROCEDURE — 99999 PR PBB SHADOW E&M-EST. PATIENT-LVL III: ICD-10-PCS | Mod: PBBFAC,,, | Performed by: FAMILY MEDICINE

## 2020-08-04 PROCEDURE — 3008F PR BODY MASS INDEX (BMI) DOCUMENTED: ICD-10-PCS | Mod: CPTII,S$GLB,, | Performed by: FAMILY MEDICINE

## 2020-08-04 PROCEDURE — 99396 PREV VISIT EST AGE 40-64: CPT | Mod: S$GLB,,, | Performed by: FAMILY MEDICINE

## 2020-08-04 PROCEDURE — 99396 PR PREVENTIVE VISIT,EST,40-64: ICD-10-PCS | Mod: S$GLB,,, | Performed by: FAMILY MEDICINE

## 2020-08-04 PROCEDURE — 3008F BODY MASS INDEX DOCD: CPT | Mod: CPTII,S$GLB,, | Performed by: FAMILY MEDICINE

## 2020-08-04 PROCEDURE — 99999 PR PBB SHADOW E&M-EST. PATIENT-LVL III: CPT | Mod: PBBFAC,,, | Performed by: FAMILY MEDICINE

## 2020-08-04 NOTE — PROGRESS NOTES
Subjective:       Patient ID: Valentin Johnson is a 49 y.o. male.    Chief Complaint: Executive Health    Here for executive health exam.  In his normal state of health but with renal mass discovered with planned resection later this month.     Review of Systems   Constitutional: Negative for chills and fever.   Respiratory: Negative for cough, chest tightness and shortness of breath.    Cardiovascular: Negative for chest pain, palpitations and leg swelling.   Endocrine: Negative for cold intolerance and heat intolerance.   Psychiatric/Behavioral: Negative for decreased concentration. The patient is not nervous/anxious.        Objective:      Physical Exam  Vitals signs and nursing note reviewed.   Constitutional:       Appearance: He is well-developed.   HENT:      Head: Normocephalic and atraumatic.   Cardiovascular:      Rate and Rhythm: Normal rate and regular rhythm.      Heart sounds: Normal heart sounds.   Pulmonary:      Effort: Pulmonary effort is normal.      Breath sounds: Normal breath sounds.         Assessment:       1. Wellness examination        Plan:       Wellness examination        Thank you for choosing me for your executive health exam.  I have reviewed your labs and they look good. Please do not hesitate to contact me with any questions or concerns regarding any health issues.     Thank you,          Dr. Castañeda    Follow up in about 1 year (around 8/4/2021), or if symptoms worsen or fail to improve.

## 2020-08-11 ENCOUNTER — LAB VISIT (OUTPATIENT)
Dept: PRIMARY CARE CLINIC | Facility: CLINIC | Age: 50
End: 2020-08-11
Payer: COMMERCIAL

## 2020-08-11 DIAGNOSIS — Z01.818 PRE-OP TESTING: ICD-10-CM

## 2020-08-11 PROCEDURE — U0003 INFECTIOUS AGENT DETECTION BY NUCLEIC ACID (DNA OR RNA); SEVERE ACUTE RESPIRATORY SYNDROME CORONAVIRUS 2 (SARS-COV-2) (CORONAVIRUS DISEASE [COVID-19]), AMPLIFIED PROBE TECHNIQUE, MAKING USE OF HIGH THROUGHPUT TECHNOLOGIES AS DESCRIBED BY CMS-2020-01-R: HCPCS

## 2020-08-12 LAB — SARS-COV-2 RNA RESP QL NAA+PROBE: NOT DETECTED

## 2020-08-13 ENCOUNTER — HOSPITAL ENCOUNTER (OUTPATIENT)
Dept: PREADMISSION TESTING | Facility: HOSPITAL | Age: 50
Discharge: HOME | End: 2020-08-13
Attending: UROLOGY
Payer: COMMERCIAL

## 2020-08-13 VITALS — HEIGHT: 72 IN | BODY MASS INDEX: 26.14 KG/M2 | WEIGHT: 193 LBS

## 2020-08-13 DIAGNOSIS — N28.89 RIGHT RENAL MASS: ICD-10-CM

## 2020-08-13 LAB
ABO + RH BLD: NORMAL
BLD GP AB SCN CELLS X3 SERPL QL: NORMAL

## 2020-08-13 PROCEDURE — 99900104 DSU ONLY-NO CHARGE-EA ADD'L HR (STAT)

## 2020-08-13 PROCEDURE — 93005 ELECTROCARDIOGRAM TRACING: CPT

## 2020-08-13 PROCEDURE — 99900103 DSU ONLY-NO CHARGE-INITIAL HR (STAT)

## 2020-08-13 PROCEDURE — 36415 COLL VENOUS BLD VENIPUNCTURE: CPT

## 2020-08-13 PROCEDURE — 86901 BLOOD TYPING SEROLOGIC RH(D): CPT

## 2020-08-13 NOTE — DISCHARGE INSTRUCTIONS
To confirm, Your doctor has instructed you that surgery is scheduled for: 8/26/20   Esthela  [phone]    Covid test - 8/23/20    Please report to [facility], [facility] the morning of surgery. You must check-in and receive a wristband before going to your procedure.    Pre-Op will call the afternoon prior to surgery between 1:00 and 6:00 PM with the final arrival time.  Phone number: [phone]    PLEASE NOTE:  The surgery schedule has many variables which may affect the time of your surgery case.  Family members should be available if your surgery time changes.  Plan to be here the day of your procedure between 4-6 hours.    MEDICATIONS:  TAKE ONLY THESE MEDICATIONS WITH A SMALL SIP OF WATER THE MORNING OF YOUR PROCEDURE:    -0-    DO NOT TAKE THESE MEDICATIONS 5-7 DAYS PRIOR to your procedure or per your surgeon's request: ASPIRIN, ALEVE, ADVIL, IBUPROFEN, FISH OIL VITAMIN E, HERBALS  (May take Tylenol)    ONLY if you are prescribed any types of blood thinners such as:  Aspirin, Coumadin, Plavix, Pradaxa, Xarelto, Aggrenox, Effient, Eliquis, Savasya, Brilinta, or any other, ask your surgeon whether you should stop taking them and how long before surgery you should stop.  You may also need to verify with the prescribing physician if it is ok to stop your medication.      INSTRUCTIONS IMPORTANT!!  · Do not eat or drink anything between midnight and the time of your procedure- this includes gum, mints, and candy.  · Do not smoke or drink alcoholic beverages 24 hours prior to your procedure.  · Shower the night before AND the morning of your procedure with a Chlorhexidine wash such as Hibiclens or Dial antibacterial soap from the neck down.  Do not get it on your face or in your eyes.  You may use your own shampoo and face wash. This helps your skin to be as bacteria free as possible.    · If you wear contact lenses, dentures, hearing aids or glasses, bring a container to put them in  during surgery and give to a family member for safe keeping.  Please leave all jewelry, piercing's and valuables at home.   · DO NOT remove hair from the surgery site.  Do not shave the incision site unless you are given specific instructions to do so.    · ONLY if you have been diagnosed with sleep apnea please bring your C-PAP machine.  · ONLY if you wear home oxygen please bring your portable oxygen tank the day of your procedure.  · ONLY if you have a history of OPEN HEART SURGERY you will need a clearance from your Cardiologist per Anesthesia.      · ONLY for patients requiring bowel prep, written instructions will be given by your doctor's office.  · ONLY if you have a neuro stimulator, please bring the controller with you the morning of surgery  · ONLY if a type and screen test is needed before surgery, please return:  · If your doctor has scheduled you for an overnight stay, bring a small overnight bag with any personal items you need.  · Make arrangements in advance for transportation home by a responsible adult.  It is not safe to drive a vehicle during the 24 hours after anesthesia.     · ONLY ONE VISITOR PER PATIENT IS ALLOWED TO COME IN THE HOSPITAL THE DAY OF PROCEDURE.   · Visiting hours are 8:00AM-6:00PM. For the safety of all patients, visitors under the age of 12 are not allowed above the first floor of the hospital.    · All [facility] facilities and properties are tobacco free.  Smoking is NOT allowed.       If you have any questions about these instructions, call Pre-Op Admit  Nursing at [phone] or the Pre-Op Day Surgery Unit at [phone].

## 2020-08-14 ENCOUNTER — HOSPITAL ENCOUNTER (OUTPATIENT)
Facility: HOSPITAL | Age: 50
Discharge: HOME | End: 2020-08-14
Attending: INTERNAL MEDICINE | Admitting: INTERNAL MEDICINE
Payer: COMMERCIAL

## 2020-08-14 ENCOUNTER — ANESTHESIA EVENT (OUTPATIENT)
Dept: ENDOSCOPY | Facility: HOSPITAL | Age: 50
End: 2020-08-14
Payer: COMMERCIAL

## 2020-08-14 ENCOUNTER — ANESTHESIA (OUTPATIENT)
Dept: ENDOSCOPY | Facility: HOSPITAL | Age: 50
End: 2020-08-14
Payer: COMMERCIAL

## 2020-08-14 ENCOUNTER — TELEPHONE (OUTPATIENT)
Dept: GASTROENTEROLOGY | Facility: CLINIC | Age: 50
End: 2020-08-14

## 2020-08-14 DIAGNOSIS — K61.1 RECTAL ABSCESS: Primary | ICD-10-CM

## 2020-08-14 DIAGNOSIS — K62.5 RECTAL BLEEDING: ICD-10-CM

## 2020-08-14 DIAGNOSIS — K61.2 ABSCESS OF ANAL OR RECTAL REGION: Primary | ICD-10-CM

## 2020-08-14 PROCEDURE — 88305 TISSUE EXAM BY PATHOLOGIST: CPT | Mod: 26,,, | Performed by: PATHOLOGY

## 2020-08-14 PROCEDURE — 63600175 PHARM REV CODE 636 W HCPCS: Performed by: NURSE ANESTHETIST, CERTIFIED REGISTERED

## 2020-08-14 PROCEDURE — 37000009 HC ANESTHESIA EA ADD 15 MINS: Performed by: INTERNAL MEDICINE

## 2020-08-14 PROCEDURE — D9220A PRA ANESTHESIA: ICD-10-PCS | Mod: CRNA,,, | Performed by: NURSE ANESTHETIST, CERTIFIED REGISTERED

## 2020-08-14 PROCEDURE — 27201012 HC FORCEPS, HOT/COLD, DISP: Performed by: INTERNAL MEDICINE

## 2020-08-14 PROCEDURE — D9220A PRA ANESTHESIA: ICD-10-PCS | Mod: ANES,,, | Performed by: ANESTHESIOLOGY

## 2020-08-14 PROCEDURE — 88305 TISSUE EXAM BY PATHOLOGIST: ICD-10-PCS | Mod: 26,,, | Performed by: PATHOLOGY

## 2020-08-14 PROCEDURE — D9220A PRA ANESTHESIA: Mod: CRNA,,, | Performed by: NURSE ANESTHETIST, CERTIFIED REGISTERED

## 2020-08-14 PROCEDURE — 45380 COLONOSCOPY AND BIOPSY: CPT | Mod: 22,,, | Performed by: INTERNAL MEDICINE

## 2020-08-14 PROCEDURE — 45380 COLONOSCOPY AND BIOPSY: CPT | Performed by: INTERNAL MEDICINE

## 2020-08-14 PROCEDURE — 25000003 PHARM REV CODE 250: Performed by: INTERNAL MEDICINE

## 2020-08-14 PROCEDURE — 45380 PR COLONOSCOPY,BIOPSY: ICD-10-PCS | Mod: 22,,, | Performed by: INTERNAL MEDICINE

## 2020-08-14 PROCEDURE — 88305 TISSUE EXAM BY PATHOLOGIST: CPT | Performed by: PATHOLOGY

## 2020-08-14 PROCEDURE — 37000008 HC ANESTHESIA 1ST 15 MINUTES: Performed by: INTERNAL MEDICINE

## 2020-08-14 PROCEDURE — D9220A PRA ANESTHESIA: Mod: ANES,,, | Performed by: ANESTHESIOLOGY

## 2020-08-14 RX ORDER — LIDOCAINE HYDROCHLORIDE 20 MG/ML
INJECTION INTRAVENOUS
Status: DISCONTINUED | OUTPATIENT
Start: 2020-08-14 | End: 2020-08-14

## 2020-08-14 RX ORDER — CIPROFLOXACIN 500 MG/1
500 TABLET ORAL 2 TIMES DAILY
Qty: 20 TABLET | Refills: 0 | Status: SHIPPED | OUTPATIENT
Start: 2020-08-14 | End: 2020-08-24

## 2020-08-14 RX ORDER — PROPOFOL 10 MG/ML
VIAL (ML) INTRAVENOUS
Status: DISCONTINUED | OUTPATIENT
Start: 2020-08-14 | End: 2020-08-14

## 2020-08-14 RX ORDER — METRONIDAZOLE 500 MG/1
500 TABLET ORAL 3 TIMES DAILY
Qty: 30 TABLET | Refills: 0 | Status: SHIPPED | OUTPATIENT
Start: 2020-08-14 | End: 2020-08-24

## 2020-08-14 RX ORDER — SODIUM CHLORIDE 9 MG/ML
INJECTION, SOLUTION INTRAVENOUS CONTINUOUS
Status: DISCONTINUED | OUTPATIENT
Start: 2020-08-14 | End: 2020-08-14 | Stop reason: HOSPADM

## 2020-08-14 RX ADMIN — PROPOFOL 40 MG: 10 INJECTION, EMULSION INTRAVENOUS at 09:08

## 2020-08-14 RX ADMIN — PROPOFOL 30 MG: 10 INJECTION, EMULSION INTRAVENOUS at 09:08

## 2020-08-14 RX ADMIN — PROPOFOL 20 MG: 10 INJECTION, EMULSION INTRAVENOUS at 09:08

## 2020-08-14 RX ADMIN — LIDOCAINE HYDROCHLORIDE 80 MG: 20 INJECTION, SOLUTION INTRAVENOUS at 09:08

## 2020-08-14 RX ADMIN — PROPOFOL 100 MG: 10 INJECTION, EMULSION INTRAVENOUS at 09:08

## 2020-08-14 RX ADMIN — SODIUM CHLORIDE: 0.9 INJECTION, SOLUTION INTRAVENOUS at 09:08

## 2020-08-14 NOTE — ANESTHESIA POSTPROCEDURE EVALUATION
Anesthesia Post Evaluation    Patient: Valentin Johnson    Procedure(s) Performed: Procedure(s) (LRB):  COLONOSCOPY (N/A)    Final Anesthesia Type: general    Patient location during evaluation: PACU  Patient participation: Yes- Able to Participate  Level of consciousness: awake and alert  Post-procedure vital signs: reviewed and stable  Pain management: adequate  Airway patency: patent    PONV status at discharge: No PONV  Anesthetic complications: no      Cardiovascular status: hemodynamically stable  Respiratory status: unassisted and room air  Hydration status: euvolemic  Follow-up not needed.          Vitals Value Taken Time   /81 08/14/20 1023   Temp 36.6 °C (97.9 °F) 08/14/20 1013   Pulse 66 08/14/20 1023   Resp 17 08/14/20 1023   SpO2 98 % 08/14/20 1023         No case tracking events are documented in the log.      Pain/Mariam Score: Mariam Score: 10 (8/14/2020 10:23 AM)

## 2020-08-14 NOTE — DISCHARGE INSTRUCTIONS
"We hope your stay was comfortable as you heal now, mend and rest.    For we have enjoyed taking care of you by giving your our best.    And as you get better, by regaining your health and strength;   We count it as a privilege to have served you and hope your time at [facility] was well spent.      Thank  You!!!Discharge Instructions: After Your Surgery/Procedure  Youve just had surgery. During surgery you were given medicine called anesthesia to keep you relaxed and free of pain. After surgery you may have some pain or nausea. This is common. Here are some tips for feeling better and getting well after surgery.     Stay on schedule with your medication.   Going home  Your doctor or nurse will show you how to take care of yourself when you go home. He or she will also answer your questions. Have an adult family member or friend drive you home.      For your safety we recommend these precaution for the first 24 hours after your procedure:  · Do not drive or use heavy equipment.  · Do not make important decisions or sign legal papers.  · Do not drink alcohol.  · Have someone stay with you, if needed. He or she can watch for problems and help keep you safe.  · Your concentration, balance, coordination, and judgement may be impaired for many hours after anesthesia.  Use caution when ambulating or standing up.     · You may feel weak and "washed out" after anesthesia and surgery.      Subtle residual effects of general anesthesia or sedation with regional / local anesthesia can last more than 24 hours.  Rest for the remainder of the day or longer if your Doctor/Surgeon has advised you to do so.  Although you may feel normal within the first 24 hours, your reflexes and mental ability may be impaired without you realizing it.  You may feel dizzy, lightheaded or sleepy for 24 hours or longer.      Be sure to go to all follow-up visits with your doctor. And rest after your surgery for as long as your doctor tells you " to.  Coping with pain  If you have pain after surgery, pain medicine will help you feel better. Take it as told, before pain becomes severe. Also, ask your doctor or pharmacist about other ways to control pain. This might be with heat, ice, or relaxation. And follow any other instructions your surgeon or nurse gives you.  Tips for taking pain medicine  To get the best relief possible, remember these points:  · Pain medicines can upset your stomach. Taking them with a little food may help.  · Most pain relievers taken by mouth need at least 20 to 30 minutes to start to work.  · Taking medicine on a schedule can help you remember to take it. Try to time your medicine so that you can take it before starting an activity. This might be before you get dressed, go for a walk, or sit down for dinner.  · Constipation is a common side effect of pain medicines. Call your doctor before taking any medicines such as laxatives or stool softeners to help ease constipation. Also ask if you should skip any foods. Drinking lots of fluids and eating foods such as fruits and vegetables that are high in fiber can also help. Remember, do not take laxatives unless your surgeon has prescribed them.  · Drinking alcohol and taking pain medicine can cause dizziness and slow your breathing. It can even be deadly. Do not drink alcohol while taking pain medicine.  · Pain medicine can make you react more slowly to things. Do not drive or run machinery while taking pain medicine.  Your health care provider may tell you to take acetaminophen to help ease your pain. Ask him or her how much you are supposed to take each day. Acetaminophen or other pain relievers may interact with your prescription medicines or other over-the-counter (OTC) drugs. Some prescription medicines have acetaminophen and other ingredients. Using both prescription and OTC acetaminophen for pain can cause you to overdose. Read the labels on your OTC medicines with care. This  will help you to clearly know the list of ingredients, how much to take, and any warnings. It may also help you not take too much acetaminophen. If you have questions or do not understand the information, ask your pharmacist or health care provider to explain it to you before you take the OTC medicine.  Managing nausea  Some people have an upset stomach after surgery. This is often because of anesthesia, pain, or pain medicine, or the stress of surgery. These tips will help you handle nausea and eat healthy foods as you get better. If you were on a special food plan before surgery, ask your doctor if you should follow it while you get better. These tips may help:  · Do not push yourself to eat. Your body will tell you when to eat and how much.  · Start off with clear liquids and soup. They are easier to digest.  · Next try semi-solid foods, such as mashed potatoes, applesauce, and gelatin, as you feel ready.  · Slowly move to solid foods. Dont eat fatty, rich, or spicy foods at first.  · Do not force yourself to have 3 large meals a day. Instead eat smaller amounts more often.  · Take pain medicines with a small amount of solid food, such as crackers or toast, to avoid nausea.     Call your surgeon if  · You still have pain an hour after taking medicine. The medicine may not be strong enough.  · You feel too sleepy, dizzy, or groggy. The medicine may be too strong.  · You have side effects like nausea, vomiting, or skin changes, such as rash, itching, or hives.       If you have obstructive sleep apnea  You were given anesthesia medicine during surgery to keep you comfortable and free of pain. After surgery, you may have more apnea spells because of this medicine and other medicines you were given. The spells may last longer than usual.   At home:  · Keep using the continuous positive airway pressure (CPAP) device when you sleep. Unless your health care provider tells you not to, use it when you sleep, day or  night. CPAP is a common device used to treat obstructive sleep apnea.  · Talk with your provider before taking any pain medicine, muscle relaxants, or sedatives. Your provider will tell you about the possible dangers of taking these medicines.  © 7521-5248 [employer]. [address] [place] [address]. All rights reserved. This information is not intended as a substitute for professional medical care. Always follow your healthcare professional's instructions.    Eating a High-Fiber Diet  Fiber is what gives strength and structure to plants. Most grains, beans, vegetables, and fruits contain fiber. Foods rich in fiber are often low in calories and fat, and they fill you up more. They may also reduce your risks for certain health problems. To find out the amount of fiber in canned, packaged, or frozen foods, read the Nutrition Facts label. It tells you how much fiber is in a serving.    Types of fiber and their benefits  There are two types of fiber: insoluble and soluble. They both aid digestion and help you maintain a healthy weight.  · Insoluble fiber. This is found in whole grains, cereals, certain fruits and vegetables such as apple skin, corn, and carrots. Insoluble fiber may prevent constipation and reduce the risk for certain types of cancer.  · Soluble fiber. This type of fiber is in oats, beans, and certain fruits and vegetables such as strawberries and peas. Soluble fiber can reduce cholesterol, which may help lower the risk for heart disease. It also helps control blood sugar levels.  Look for high-fiber foods  Try these foods to add fiber to your diet:  · Whole-grain breads and cereals. Try to eat 6 to 8 ounces a day. Include wheat and oat bran cereals, whole-wheat muffins or toast, and corn tortillas in your meals.  · Fruits. Try to eat 2 cups a day. Apples, oranges, strawberries, pears, and bananas are good sources. (Note: Fruit juice is low in fiber.)  · Vegetables. Try to eat at  least 2.5 cups a day. Add asparagus, carrots, broccoli, peas, and corn to your meals.  · Beans. One cup of cooked lentils gives you over 15 grams of fiber. Try navy beans, lentils, and chickpeas.  · Seeds. A small handful of seeds gives you about 3 grams of fiber. Try sunflower seeds.  Keep track of your fiber  Keep track of how much fiber you eat. Start by reading food labels. Then eat a variety of foods high in fiber. As you begin to eat more fiber, ask your healthcare provider how much water you should be drinking to keep your digestive system working smoothly.  You should aim for a certain amount of fiber in your diet each day. If you are a woman, that amount is between 25 and 28 grams per day. Men should aim for 30 to 33 grams per day. After age 50, your daily fiber needs drop to 22 grams for women and 28 grams for men.  Before you reach for the fiber supplements, think about this. Fiber is found naturally in healthy whole foods. It gives you that feeling of fullness after you eat. Taking fiber supplements or eating fiber-enriched foods will not give you this full feeling.  Your fiber intake is a good measure for the quality of your overall diet. If you are missing out on your daily amount of fiber, you may be lacking other important nutrients as well.  Date Last Reviewed: 5/11/2015 © 2000-2017 [employer]. [address] [place] [address]. All rights reserved. This information is not intended as a substitute for professional medical care. Always follow your healthcare professional's instructions.        Hemorrhoids    Hemorrhoids are swollen and inflamed veins inside the rectum and near the anus. The rectum is the last several inches of the colon. The anus is the passage between the rectum and the outside of the body.  Causes  The veins can become swollen due to increased pressure in them. This is most often caused by:  · Chronic constipation or diarrhea  · Straining when having a bowel  movement  · Sitting too long on the toilet  · A low-fiber diet  · Pregnancy  Symptoms  · Bleeding from the rectum (this may be noticeable after bowel movements)  · Lump near the anus  · Itching around the anus  · Pain around the anus  There are different types of hemorrhoids. Depending on the type you have and the severity, you may be able to treat yourself at home. In some cases, a procedure may be the best treatment option. Your healthcare provider can tell you more about this, if needed.  Home care  General care  · To get relief from pain or itching, try:  ¨ Topical products. Your healthcare provider may prescribe or recommend creams, ointments, or pads that can be applied to the hemorrhoid. Use these exactly as directed.  ¨ Medicines. Your healthcare provider may recommend stool softeners, suppositories, or laxatives to help manage constipation. Use these exactly as directed.  ¨ Sitz baths. A sitz bath involves sitting in a few inches of warm bath water. Be careful not to make the water so hot that you burn yourself--test it before sitting in it. Soak for about 10 to 15 minutes a few times a day. This may help relieve pain.  Tips to help prevent hemorrhoids  · Eat more fiber. Fiber adds bulk to stool and absorbs water as it moves through your colon. This makes stool softer and easier to pass.  ¨ Increase the fiber in your diet with more fiber-rich foods. These include fresh fruit, vegetables, and whole grains.  ¨ Take a fiber supplement or bulking agent, if advised to by your provider. These include products such as psyllium or methylcellulose.  · Drink plenty of water, if directed to by your provider. This can help keep stool soft.  · Be more active. Frequent exercise aids digestion and helps prevent constipation. It may also help make bowel movements more regular.  · Dont strain during bowel movements. This can make hemorrhoids more likely. Also, dont sit on the toilet for long periods of time.  Follow-up  care  Follow up with your healthcare provider, or as advised. If a culture or imaging tests were done, you will be notified of the results when they are ready. This may take a few days or longer.  When to seek medical advice  Call your healthcare provider right away if any of these occur:  · Increased bleeding from the rectum  · Increased pain around the rectum or anus  · Weakness or dizziness  Call 911  Call 911 or return to the emergency department right away if any of these occur:  · Trouble breathing or swallowing  · Fainting or loss of consciousness  · Unusually fast heart rate  · Vomiting blood  · Large amounts of blood in stool  Date Last Reviewed: 6/22/2015 © 2000-2017 [employer]. [address] [place] [address], [place] [address]. All rights reserved. This information is not intended as a substitute for professional medical care. Always follow your healthcare professional's instructions.

## 2020-08-14 NOTE — ANESTHESIA PREPROCEDURE EVALUATION
08/14/2020  Valentin Johnson is a 49 y.o., male.    Anesthesia Evaluation    I have reviewed the Patient Summary Reports.    I have reviewed the Nursing Notes. I have reviewed the NPO Status.   I have reviewed the Medications.     Review of Systems  Anesthesia Hx:  No problems with previous Anesthesia    Social:  Non-Smoker    Hematology/Oncology:  Hematology Normal   Oncology Normal     EENT/Dental:EENT/Dental Normal   Cardiovascular:  Cardiovascular Normal     Pulmonary:  Pulmonary Normal    Renal/:   Renal mass    Hepatic/GI:   Bowel Prep. GERD Rectal bleeding    Neurological:   Neuromuscular Disease,    Dermatological:  Skin Normal    Psych:  Psychiatric Normal           Physical Exam  General:  Well nourished    Airway/Jaw/Neck:  Airway Findings: Mouth Opening: Normal Tongue: Normal  General Airway Assessment: Adult  Mallampati: II  Improves to I with phonation.        Eyes/Ears/Nose:  EYES/EARS/NOSE FINDINGS: Normal   Dental:  Dental Findings: In tact   Chest/Lungs:  Chest/Lungs Findings: Normal Respiratory Rate, Clear to auscultation     Heart/Vascular:  Heart Findings: Rate: Normal  Rhythm: Regular Rhythm        Mental Status:  Mental Status Findings:  Cooperative, Alert and Oriented         Anesthesia Plan  Type of Anesthesia, risks & benefits discussed:  Anesthesia Type:  general  Patient's Preference:   Intra-op Monitoring Plan: standard ASA monitors  Intra-op Monitoring Plan Comments:   Post Op Pain Control Plan:   Post Op Pain Control Plan Comments:   Induction:   IV  Beta Blocker:  Patient is not currently on a Beta-Blocker (No further documentation required).       Informed Consent: Patient understands risks and agrees with Anesthesia plan.  Questions answered. Anesthesia consent signed with patient.  ASA Score: 2     Day of Surgery Review of History & Physical: I have interviewed and  examined the patient. I have reviewed the patient's H&P dated:    H&P update referred to the provider.         Ready For Surgery From Anesthesia Perspective.

## 2020-08-14 NOTE — TRANSFER OF CARE
Anesthesia Transfer of Care Note    Patient: Valentin Johnson    Procedure(s) Performed: Procedure(s) (LRB):  COLONOSCOPY (N/A)    Patient location: GI    Anesthesia Type: MAC    Transport from OR: Transported from OR on room air with adequate spontaneous ventilation    Post pain: adequate analgesia    Post assessment: no apparent anesthetic complications and tolerated procedure well    Post vital signs: stable    Level of consciousness: awake, alert and oriented    Nausea/Vomiting: no nausea/vomiting    Complications: none    Transfer of care protocol was followed      Last vitals:   Visit Vitals  /82 (BP Location: Left arm, Patient Position: Lying)   Pulse 80   Temp 36.6 °C (97.9 °F) (Skin)   Resp 16   Ht 6' (1.829 m)   Wt 86.2 kg (190 lb)   SpO2 100%   BMI 25.77 kg/m²

## 2020-08-14 NOTE — TELEPHONE ENCOUNTER
Ref sent to [facility]    ----- Message from Christian Velasquez MD sent at 8/14/2020 10:11 AM CDT -----  Referral to [facility] for rectal abscess located on colonoscopy.

## 2020-08-14 NOTE — PLAN OF CARE
Patient awake, alert, and oriented.  No complaints of pain or discomfort at present time.  Abdomen soft and nontender;  Ambulates without difficulty;  All instructions given and reviewed with patient and family;  Stable for discharge to home accompanied by son driving him home informed him to  rx and get it filled and take it all

## 2020-08-14 NOTE — [employer] PATIENT INSTRUCTIONS
Discharge Summary/Instructions after an Endoscopic Procedure  Patient Name: Valentin Johnson  Patient MRN: [id number]  Patient YOB: 1970 Friday, August 14, 2020  Christian Arellano MD  RESTRICTIONS:  During your procedure today, you received medications for sedation.  These   medications may affect your judgment, balance and coordination.  Therefore,   for 24 hours, you have the following restrictions:   - DO NOT drive a car, operate machinery, make legal/financial decisions,   sign important papers or drink alcohol.    ACTIVITY:  Today: no heavy lifting, straining or running due to procedural   sedation/anesthesia.  The following day: return to full activity including work.  DIET:  Eat and drink normally unless instructed otherwise.     TREATMENT FOR COMMON SIDE EFFECTS:  - Mild abdominal pain, nausea, belching, bloating or excessive gas:  rest,   eat lightly and use a heating pad.  - Sore Throat: treat with throat lozenges and/or gargle with warm salt   water.  - Because air was used during the procedure, expelling large amounts of air   from your rectum or belching is normal.  - If a bowel prep was taken, you may not have a bowel movement for 1-3 days.    This is normal.  SYMPTOMS TO WATCH FOR AND REPORT TO YOUR PHYSICIAN:  1. Abdominal pain or bloating, other than gas cramps.  2. Chest pain.  3. Back pain.  4. Signs of infection such as: chills or fever occurring within 24 hours   after the procedure.  5. Rectal bleeding, which would show as bright red, maroon, or black stools.   (A tablespoon of blood from the rectum is not serious, especially if   hemorrhoids are present.)  6. Vomiting.  7. Weakness or dizziness.  GO DIRECTLY TO THE NEAREST EMERGENCY ROOM IF YOU HAVE ANY OF THE FOLLOWING:      Difficulty breathing              Chills and/or fever over 101 F   Persistent vomiting and/or vomiting blood   Severe abdominal pain   Severe chest pain   Black, tarry stools   Bleeding- more than one  tablespoon   Any other symptom or condition that you feel may need urgent attention  Your doctor recommends these additional instructions:  If any biopsies were taken, your doctors clinic will contact you in 1 to 2   weeks with any results.  - Patient has a contact number available for emergencies.  The signs and   symptoms of potential delayed complications were discussed with the   patient.  Return to normal activities tomorrow.  Written discharge   instructions were provided to the patient.   - High fiber diet.   - Continue present medications.   - No aspirin, ibuprofen, naproxen, or other non-steroidal anti-inflammatory   drugs.   - Await pathology results.   - Repeat colonoscopy in 10 years for screening purposes.   - Discharge patient to home (ambulatory).   - Take Sitz baths Daily as needed.   - Augmentin (amoxicillin/clavulanate) 875 mg PO BID for 10 days.   - Return to my office after studies are complete.   - Refer to a colo-rectal surgeon at appointment to be scheduled.  For questions, problems or results please call your physician - Christian Arellano MD at Work:  [phone].  [facility], EMERGENCY ROOM PHONE NUMBER: [phone]  IF A COMPLICATION OR EMERGENCY SITUATION ARISES AND YOU ARE UNABLE TO REACH   YOUR PHYSICIAN - GO DIRECTLY TO THE EMERGENCY ROOM.  Christian Arellano MD  8/14/2020 10:08:39 AM  This report has been verified and signed electronically.  [employer]

## 2020-08-17 VITALS
RESPIRATION RATE: 18 BRPM | BODY MASS INDEX: 25.73 KG/M2 | DIASTOLIC BLOOD PRESSURE: 74 MMHG | WEIGHT: 190 LBS | HEART RATE: 77 BPM | SYSTOLIC BLOOD PRESSURE: 123 MMHG | TEMPERATURE: 98 F | OXYGEN SATURATION: 99 % | HEIGHT: 72 IN

## 2020-08-17 LAB
FINAL PATHOLOGIC DIAGNOSIS: NORMAL
GROSS: NORMAL

## 2020-08-18 ENCOUNTER — ANESTHESIA EVENT (OUTPATIENT)
Dept: SURGERY | Facility: HOSPITAL | Age: 50
DRG: 658 | End: 2020-08-18
Payer: COMMERCIAL

## 2020-08-18 NOTE — OR NURSING
Dr Chambers reviewed pts chart and most recent EKG 8/13/20. Ok to proceed with upcoming procedure with Dr. Gomez.

## 2020-08-22 ENCOUNTER — PATIENT MESSAGE (OUTPATIENT)
Dept: SURGERY | Facility: HOSPITAL | Age: 50
End: 2020-08-22

## 2020-08-26 ENCOUNTER — TELEPHONE (OUTPATIENT)
Dept: SURGERY | Facility: CLINIC | Age: 50
End: 2020-08-26

## 2020-08-26 ENCOUNTER — ANESTHESIA (OUTPATIENT)
Dept: SURGERY | Facility: HOSPITAL | Age: 50
DRG: 658 | End: 2020-08-26
Payer: COMMERCIAL

## 2020-08-27 NOTE — TELEPHONE ENCOUNTER
I called patient to schedule an appointment and he stated that he's having kidney surgery on 9/2/20.  He's not having any problems right now, but he'll call back to schedule when he can.  Sincere

## 2020-08-30 ENCOUNTER — APPOINTMENT (OUTPATIENT)
Dept: PRIMARY CARE CLINIC | Facility: CLINIC | Age: 50
End: 2020-08-30
Payer: COMMERCIAL

## 2020-08-30 DIAGNOSIS — Z01.818 PREOP TESTING: ICD-10-CM

## 2020-08-30 PROCEDURE — U0003 INFECTIOUS AGENT DETECTION BY NUCLEIC ACID (DNA OR RNA); SEVERE ACUTE RESPIRATORY SYNDROME CORONAVIRUS 2 (SARS-COV-2) (CORONAVIRUS DISEASE [COVID-19]), AMPLIFIED PROBE TECHNIQUE, MAKING USE OF HIGH THROUGHPUT TECHNOLOGIES AS DESCRIBED BY CMS-2020-01-R: HCPCS

## 2020-08-31 LAB — SARS-COV-2 RNA RESP QL NAA+PROBE: NOT DETECTED

## 2020-09-02 ENCOUNTER — HOSPITAL ENCOUNTER (INPATIENT)
Facility: HOSPITAL | Age: 50
LOS: 1 days | Discharge: HOME | DRG: 658 | End: 2020-09-03
Attending: UROLOGY | Admitting: UROLOGY
Payer: COMMERCIAL

## 2020-09-02 DIAGNOSIS — N28.89 RIGHT RENAL MASS: Primary | ICD-10-CM

## 2020-09-02 LAB
ABO + RH BLD: NORMAL
ANION GAP SERPL CALC-SCNC: 13 MMOL/L (ref 8–16)
BASOPHILS # BLD AUTO: 0.01 K/UL (ref 0–0.2)
BASOPHILS NFR BLD: 0.1 % (ref 0–1.9)
BLD GP AB SCN CELLS X3 SERPL QL: NORMAL
BUN SERPL-MCNC: 15 MG/DL (ref 6–20)
CALCIUM SERPL-MCNC: 8.1 MG/DL (ref 8.7–10.5)
CHLORIDE SERPL-SCNC: 102 MMOL/L (ref 95–110)
CO2 SERPL-SCNC: 25 MMOL/L (ref 23–29)
CREAT SERPL-MCNC: 1.3 MG/DL (ref 0.5–1.4)
DIFFERENTIAL METHOD: ABNORMAL
EOSINOPHIL # BLD AUTO: 0 K/UL (ref 0–0.5)
EOSINOPHIL NFR BLD: 0 % (ref 0–8)
ERYTHROCYTE [DISTWIDTH] IN BLOOD BY AUTOMATED COUNT: 13.8 % (ref 11.5–14.5)
EST. GFR  (AFRICAN AMERICAN): >60 ML/MIN/1.73 M^2
EST. GFR  (NON AFRICAN AMERICAN): >60 ML/MIN/1.73 M^2
GLUCOSE SERPL-MCNC: 149 MG/DL (ref 70–110)
HCT VFR BLD AUTO: 42.8 % (ref 40–54)
HGB BLD-MCNC: 13.7 G/DL (ref 14–18)
IMM GRANULOCYTES # BLD AUTO: 0.09 K/UL (ref 0–0.04)
IMM GRANULOCYTES NFR BLD AUTO: 0.5 % (ref 0–0.5)
LYMPHOCYTES # BLD AUTO: 0.4 K/UL (ref 1–4.8)
LYMPHOCYTES NFR BLD: 2.5 % (ref 18–48)
MCH RBC QN AUTO: 29.5 PG (ref 27–31)
MCHC RBC AUTO-ENTMCNC: 32 G/DL (ref 32–36)
MCV RBC AUTO: 92 FL (ref 82–98)
MONOCYTES # BLD AUTO: 0.3 K/UL (ref 0.3–1)
MONOCYTES NFR BLD: 1.9 % (ref 4–15)
NEUTROPHILS # BLD AUTO: 16.7 K/UL (ref 1.8–7.7)
NEUTROPHILS NFR BLD: 95 % (ref 38–73)
NRBC BLD-RTO: 0 /100 WBC
PLATELET # BLD AUTO: 259 K/UL (ref 150–350)
PMV BLD AUTO: 9.4 FL (ref 9.2–12.9)
POTASSIUM SERPL-SCNC: 4.5 MMOL/L (ref 3.5–5.1)
RBC # BLD AUTO: 4.64 M/UL (ref 4.6–6.2)
SODIUM SERPL-SCNC: 140 MMOL/L (ref 136–145)
WBC # BLD AUTO: 17.62 K/UL (ref 3.9–12.7)

## 2020-09-02 PROCEDURE — 37000008 HC ANESTHESIA 1ST 15 MINUTES: Performed by: UROLOGY

## 2020-09-02 PROCEDURE — 63600175 PHARM REV CODE 636 W HCPCS: Performed by: UROLOGY

## 2020-09-02 PROCEDURE — 99900104 DSU ONLY-NO CHARGE-EA ADD'L HR (STAT): Performed by: UROLOGY

## 2020-09-02 PROCEDURE — 88307 TISSUE EXAM BY PATHOLOGIST: CPT | Performed by: PATHOLOGY

## 2020-09-02 PROCEDURE — 88307 PR  SURG PATH,LEVEL V: ICD-10-PCS | Mod: 26,,, | Performed by: PATHOLOGY

## 2020-09-02 PROCEDURE — 12000002 HC ACUTE/MED SURGE SEMI-PRIVATE ROOM

## 2020-09-02 PROCEDURE — 94761 N-INVAS EAR/PLS OXIMETRY MLT: CPT

## 2020-09-02 PROCEDURE — S0020 INJECTION, BUPIVICAINE HYDRO: HCPCS | Performed by: UROLOGY

## 2020-09-02 PROCEDURE — 94799 UNLISTED PULMONARY SVC/PX: CPT

## 2020-09-02 PROCEDURE — 36000712 HC OR TIME LEV V 1ST 15 MIN: Performed by: UROLOGY

## 2020-09-02 PROCEDURE — 88341 IMHCHEM/IMCYTCHM EA ADD ANTB: CPT | Performed by: PATHOLOGY

## 2020-09-02 PROCEDURE — 88342 IMHCHEM/IMCYTCHM 1ST ANTB: CPT | Mod: 26,,, | Performed by: PATHOLOGY

## 2020-09-02 PROCEDURE — 25000003 PHARM REV CODE 250: Performed by: ANESTHESIOLOGY

## 2020-09-02 PROCEDURE — 85025 COMPLETE CBC W/AUTO DIFF WBC: CPT

## 2020-09-02 PROCEDURE — 71000039 HC RECOVERY, EACH ADD'L HOUR: Performed by: UROLOGY

## 2020-09-02 PROCEDURE — 25000003 PHARM REV CODE 250: Performed by: UROLOGY

## 2020-09-02 PROCEDURE — 86920 COMPATIBILITY TEST SPIN: CPT

## 2020-09-02 PROCEDURE — 80048 BASIC METABOLIC PNL TOTAL CA: CPT

## 2020-09-02 PROCEDURE — 25000003 PHARM REV CODE 250: Performed by: NURSE ANESTHETIST, CERTIFIED REGISTERED

## 2020-09-02 PROCEDURE — 36415 COLL VENOUS BLD VENIPUNCTURE: CPT

## 2020-09-02 PROCEDURE — 63600175 PHARM REV CODE 636 W HCPCS: Performed by: NURSE ANESTHETIST, CERTIFIED REGISTERED

## 2020-09-02 PROCEDURE — 50545 PR LAP, RADICAL NEPHRECTOMY: ICD-10-PCS | Mod: RT,,, | Performed by: UROLOGY

## 2020-09-02 PROCEDURE — 86850 RBC ANTIBODY SCREEN: CPT

## 2020-09-02 PROCEDURE — D9220A PRA ANESTHESIA: Mod: ANES,,, | Performed by: ANESTHESIOLOGY

## 2020-09-02 PROCEDURE — 88342 CHG IMMUNOCYTOCHEMISTRY: ICD-10-PCS | Mod: 26,,, | Performed by: PATHOLOGY

## 2020-09-02 PROCEDURE — 71000033 HC RECOVERY, INTIAL HOUR: Performed by: UROLOGY

## 2020-09-02 PROCEDURE — 88341 PR IHC OR ICC EACH ADD'L SINGLE ANTIBODY  STAINPR: ICD-10-PCS | Mod: 26,,, | Performed by: PATHOLOGY

## 2020-09-02 PROCEDURE — 76998 US GUIDE INTRAOP: CPT | Mod: 26,,, | Performed by: UROLOGY

## 2020-09-02 PROCEDURE — D9220A PRA ANESTHESIA: ICD-10-PCS | Mod: CRNA,,, | Performed by: NURSE ANESTHETIST, CERTIFIED REGISTERED

## 2020-09-02 PROCEDURE — D9220A PRA ANESTHESIA: ICD-10-PCS | Mod: ANES,,, | Performed by: ANESTHESIOLOGY

## 2020-09-02 PROCEDURE — 37000009 HC ANESTHESIA EA ADD 15 MINS: Performed by: UROLOGY

## 2020-09-02 PROCEDURE — D9220A PRA ANESTHESIA: Mod: CRNA,,, | Performed by: NURSE ANESTHETIST, CERTIFIED REGISTERED

## 2020-09-02 PROCEDURE — S5010 5% DEXTROSE AND 0.45% SALINE: HCPCS | Performed by: UROLOGY

## 2020-09-02 PROCEDURE — 50545 LAPARO RADICAL NEPHRECTOMY: CPT | Mod: RT,,, | Performed by: UROLOGY

## 2020-09-02 PROCEDURE — 99900103 DSU ONLY-NO CHARGE-INITIAL HR (STAT): Performed by: UROLOGY

## 2020-09-02 PROCEDURE — 36000713 HC OR TIME LEV V EA ADD 15 MIN: Performed by: UROLOGY

## 2020-09-02 PROCEDURE — 76998 PR  ULTRASONIC GUIDANCE, INTRAOPERATIVE: ICD-10-PCS | Mod: 26,,, | Performed by: UROLOGY

## 2020-09-02 PROCEDURE — 27201423 OPTIME MED/SURG SUP & DEVICES STERILE SUPPLY: Performed by: UROLOGY

## 2020-09-02 PROCEDURE — 88307 TISSUE EXAM BY PATHOLOGIST: CPT | Mod: 26,,, | Performed by: PATHOLOGY

## 2020-09-02 PROCEDURE — 88341 IMHCHEM/IMCYTCHM EA ADD ANTB: CPT | Mod: 26,,, | Performed by: PATHOLOGY

## 2020-09-02 PROCEDURE — 88342 IMHCHEM/IMCYTCHM 1ST ANTB: CPT | Performed by: PATHOLOGY

## 2020-09-02 RX ORDER — ONDANSETRON 2 MG/ML
4 INJECTION INTRAMUSCULAR; INTRAVENOUS EVERY 6 HOURS PRN
Status: DISCONTINUED | OUTPATIENT
Start: 2020-09-02 | End: 2020-09-03 | Stop reason: HOSPADM

## 2020-09-02 RX ORDER — MIDAZOLAM HYDROCHLORIDE 1 MG/ML
INJECTION, SOLUTION INTRAMUSCULAR; INTRAVENOUS
Status: DISCONTINUED | OUTPATIENT
Start: 2020-09-02 | End: 2020-09-02

## 2020-09-02 RX ORDER — FENTANYL CITRATE 50 UG/ML
INJECTION, SOLUTION INTRAMUSCULAR; INTRAVENOUS
Status: DISCONTINUED | OUTPATIENT
Start: 2020-09-02 | End: 2020-09-02

## 2020-09-02 RX ORDER — ACETAMINOPHEN 10 MG/ML
INJECTION, SOLUTION INTRAVENOUS
Status: DISCONTINUED | OUTPATIENT
Start: 2020-09-02 | End: 2020-09-02

## 2020-09-02 RX ORDER — NEOSTIGMINE METHYLSULFATE 1 MG/ML
INJECTION, SOLUTION INTRAVENOUS
Status: DISCONTINUED | OUTPATIENT
Start: 2020-09-02 | End: 2020-09-02

## 2020-09-02 RX ORDER — SIMETHICONE 80 MG
2 TABLET,CHEWABLE ORAL 3 TIMES DAILY
Status: DISCONTINUED | OUTPATIENT
Start: 2020-09-02 | End: 2020-09-03 | Stop reason: HOSPADM

## 2020-09-02 RX ORDER — POLYETHYLENE GLYCOL 3350 17 G/17G
17 POWDER, FOR SOLUTION ORAL 2 TIMES DAILY
Status: DISCONTINUED | OUTPATIENT
Start: 2020-09-02 | End: 2020-09-03 | Stop reason: HOSPADM

## 2020-09-02 RX ORDER — HYDROMORPHONE HYDROCHLORIDE 2 MG/ML
INJECTION, SOLUTION INTRAMUSCULAR; INTRAVENOUS; SUBCUTANEOUS
Status: DISCONTINUED | OUTPATIENT
Start: 2020-09-02 | End: 2020-09-02

## 2020-09-02 RX ORDER — PHENYLEPHRINE HYDROCHLORIDE 10 MG/ML
INJECTION INTRAVENOUS
Status: DISCONTINUED | OUTPATIENT
Start: 2020-09-02 | End: 2020-09-02

## 2020-09-02 RX ORDER — HEPARIN SODIUM 10000 [USP'U]/ML
INJECTION, SOLUTION INTRAVENOUS; SUBCUTANEOUS
Status: DISCONTINUED | OUTPATIENT
Start: 2020-09-02 | End: 2020-09-02 | Stop reason: HOSPADM

## 2020-09-02 RX ORDER — SODIUM CHLORIDE, SODIUM LACTATE, POTASSIUM CHLORIDE, CALCIUM CHLORIDE 600; 310; 30; 20 MG/100ML; MG/100ML; MG/100ML; MG/100ML
INJECTION, SOLUTION INTRAVENOUS CONTINUOUS
Status: DISCONTINUED | OUTPATIENT
Start: 2020-09-02 | End: 2020-09-02

## 2020-09-02 RX ORDER — DEXAMETHASONE SODIUM PHOSPHATE 4 MG/ML
INJECTION, SOLUTION INTRA-ARTICULAR; INTRALESIONAL; INTRAMUSCULAR; INTRAVENOUS; SOFT TISSUE
Status: DISCONTINUED | OUTPATIENT
Start: 2020-09-02 | End: 2020-09-02

## 2020-09-02 RX ORDER — CIPROFLOXACIN 2 MG/ML
400 INJECTION, SOLUTION INTRAVENOUS
Status: COMPLETED | OUTPATIENT
Start: 2020-09-02 | End: 2020-09-02

## 2020-09-02 RX ORDER — CIPROFLOXACIN 2 MG/ML
400 INJECTION, SOLUTION INTRAVENOUS
Status: DISCONTINUED | OUTPATIENT
Start: 2020-09-02 | End: 2020-09-03

## 2020-09-02 RX ORDER — BUPIVACAINE HYDROCHLORIDE 5 MG/ML
INJECTION, SOLUTION EPIDURAL; INTRACAUDAL
Status: DISCONTINUED | OUTPATIENT
Start: 2020-09-02 | End: 2020-09-02 | Stop reason: HOSPADM

## 2020-09-02 RX ORDER — ONDANSETRON HYDROCHLORIDE 2 MG/ML
INJECTION, SOLUTION INTRAMUSCULAR; INTRAVENOUS
Status: DISCONTINUED | OUTPATIENT
Start: 2020-09-02 | End: 2020-09-02

## 2020-09-02 RX ORDER — ACETAMINOPHEN 10 MG/ML
1000 INJECTION, SOLUTION INTRAVENOUS EVERY 8 HOURS
Status: COMPLETED | OUTPATIENT
Start: 2020-09-02 | End: 2020-09-02

## 2020-09-02 RX ORDER — DOCUSATE SODIUM 100 MG/1
100 CAPSULE, LIQUID FILLED ORAL 2 TIMES DAILY
Status: DISCONTINUED | OUTPATIENT
Start: 2020-09-02 | End: 2020-09-03 | Stop reason: HOSPADM

## 2020-09-02 RX ORDER — HYDROMORPHONE HYDROCHLORIDE 2 MG/ML
0.2 INJECTION, SOLUTION INTRAMUSCULAR; INTRAVENOUS; SUBCUTANEOUS EVERY 5 MIN PRN
Status: DISCONTINUED | OUTPATIENT
Start: 2020-09-02 | End: 2020-09-02 | Stop reason: HOSPADM

## 2020-09-02 RX ORDER — FAMOTIDINE 20 MG/1
20 TABLET, FILM COATED ORAL 2 TIMES DAILY
Status: DISCONTINUED | OUTPATIENT
Start: 2020-09-03 | End: 2020-09-03 | Stop reason: HOSPADM

## 2020-09-02 RX ORDER — LIDOCAINE HYDROCHLORIDE 10 MG/ML
1 INJECTION, SOLUTION EPIDURAL; INFILTRATION; INTRACAUDAL; PERINEURAL ONCE
Status: COMPLETED | OUTPATIENT
Start: 2020-09-02 | End: 2020-09-02

## 2020-09-02 RX ORDER — KETOROLAC TROMETHAMINE 30 MG/ML
15 INJECTION, SOLUTION INTRAMUSCULAR; INTRAVENOUS EVERY 8 HOURS
Status: COMPLETED | OUTPATIENT
Start: 2020-09-02 | End: 2020-09-03

## 2020-09-02 RX ORDER — SIMETHICONE 80 MG
2 TABLET,CHEWABLE ORAL 3 TIMES DAILY
Status: DISCONTINUED | OUTPATIENT
Start: 2020-09-02 | End: 2020-09-02

## 2020-09-02 RX ORDER — GLYCOPYRROLATE 0.2 MG/ML
INJECTION INTRAMUSCULAR; INTRAVENOUS
Status: DISCONTINUED | OUTPATIENT
Start: 2020-09-02 | End: 2020-09-02

## 2020-09-02 RX ORDER — MANNITOL 250 MG/ML
25 INJECTION, SOLUTION INTRAVENOUS ONCE
Status: DISCONTINUED | OUTPATIENT
Start: 2020-09-02 | End: 2020-09-02

## 2020-09-02 RX ORDER — PROPOFOL 10 MG/ML
VIAL (ML) INTRAVENOUS
Status: DISCONTINUED | OUTPATIENT
Start: 2020-09-02 | End: 2020-09-02

## 2020-09-02 RX ORDER — ROCURONIUM BROMIDE 10 MG/ML
INJECTION, SOLUTION INTRAVENOUS
Status: DISCONTINUED | OUTPATIENT
Start: 2020-09-02 | End: 2020-09-02

## 2020-09-02 RX ORDER — KETOROLAC TROMETHAMINE 30 MG/ML
15 INJECTION, SOLUTION INTRAMUSCULAR; INTRAVENOUS ONCE
Status: COMPLETED | OUTPATIENT
Start: 2020-09-02 | End: 2020-09-02

## 2020-09-02 RX ORDER — MORPHINE SULFATE 2 MG/ML
2 INJECTION, SOLUTION INTRAMUSCULAR; INTRAVENOUS EVERY 4 HOURS PRN
Status: DISCONTINUED | OUTPATIENT
Start: 2020-09-02 | End: 2020-09-03 | Stop reason: HOSPADM

## 2020-09-02 RX ORDER — ACETAMINOPHEN 325 MG/1
650 TABLET ORAL EVERY 4 HOURS PRN
Status: DISCONTINUED | OUTPATIENT
Start: 2020-09-03 | End: 2020-09-03 | Stop reason: HOSPADM

## 2020-09-02 RX ORDER — LIDOCAINE HCL/PF 100 MG/5ML
SYRINGE (ML) INTRAVENOUS
Status: DISCONTINUED | OUTPATIENT
Start: 2020-09-02 | End: 2020-09-02

## 2020-09-02 RX ORDER — TRAMADOL HYDROCHLORIDE 50 MG/1
50 TABLET ORAL EVERY 6 HOURS PRN
Status: DISCONTINUED | OUTPATIENT
Start: 2020-09-02 | End: 2020-09-03 | Stop reason: HOSPADM

## 2020-09-02 RX ORDER — CHOLECALCIFEROL (VITAMIN D3) 25 MCG
1000 TABLET ORAL DAILY
COMMUNITY

## 2020-09-02 RX ORDER — DEXTROSE MONOHYDRATE AND SODIUM CHLORIDE 5; .45 G/100ML; G/100ML
INJECTION, SOLUTION INTRAVENOUS CONTINUOUS
Status: DISCONTINUED | OUTPATIENT
Start: 2020-09-02 | End: 2020-09-03

## 2020-09-02 RX ADMIN — PHENYLEPHRINE HYDROCHLORIDE 100 MCG: 10 INJECTION INTRAVENOUS at 08:09

## 2020-09-02 RX ADMIN — ROCURONIUM BROMIDE 10 MG: 10 INJECTION, SOLUTION INTRAVENOUS at 09:09

## 2020-09-02 RX ADMIN — TRAMADOL HYDROCHLORIDE 50 MG: 50 TABLET, FILM COATED ORAL at 12:09

## 2020-09-02 RX ADMIN — HYDROMORPHONE HYDROCHLORIDE 0.4 MG: 2 INJECTION, SOLUTION INTRAMUSCULAR; INTRAVENOUS; SUBCUTANEOUS at 10:09

## 2020-09-02 RX ADMIN — DOCUSATE SODIUM 100 MG: 100 CAPSULE, LIQUID FILLED ORAL at 09:09

## 2020-09-02 RX ADMIN — DEXTROSE AND SODIUM CHLORIDE: 5; .45 INJECTION, SOLUTION INTRAVENOUS at 01:09

## 2020-09-02 RX ADMIN — SODIUM CHLORIDE, SODIUM GLUCONATE, SODIUM ACETATE, POTASSIUM CHLORIDE, MAGNESIUM CHLORIDE, SODIUM PHOSPHATE, DIBASIC, AND POTASSIUM PHOSPHATE: .53; .5; .37; .037; .03; .012; .00082 INJECTION, SOLUTION INTRAVENOUS at 06:09

## 2020-09-02 RX ADMIN — FENTANYL CITRATE 100 MCG: 50 INJECTION, SOLUTION INTRAMUSCULAR; INTRAVENOUS at 07:09

## 2020-09-02 RX ADMIN — ROCURONIUM BROMIDE 20 MG: 10 INJECTION, SOLUTION INTRAVENOUS at 08:09

## 2020-09-02 RX ADMIN — ONDANSETRON 4 MG: 2 INJECTION, SOLUTION INTRAMUSCULAR; INTRAVENOUS at 07:09

## 2020-09-02 RX ADMIN — MIDAZOLAM 2 MG: 1 INJECTION INTRAMUSCULAR; INTRAVENOUS at 07:09

## 2020-09-02 RX ADMIN — NEOSTIGMINE METHYLSULFATE 4 MG: 1 INJECTION INTRAVENOUS at 11:09

## 2020-09-02 RX ADMIN — CIPROFLOXACIN 400 MG: 2 INJECTION INTRAVENOUS at 07:09

## 2020-09-02 RX ADMIN — DEXAMETHASONE SODIUM PHOSPHATE 4 MG: 4 INJECTION, SOLUTION INTRA-ARTICULAR; INTRALESIONAL; INTRAMUSCULAR; INTRAVENOUS; SOFT TISSUE at 07:09

## 2020-09-02 RX ADMIN — KETOROLAC TROMETHAMINE 15 MG: 30 INJECTION, SOLUTION INTRAMUSCULAR at 09:09

## 2020-09-02 RX ADMIN — KETOROLAC TROMETHAMINE 15 MG: 30 INJECTION, SOLUTION INTRAMUSCULAR at 01:09

## 2020-09-02 RX ADMIN — SIMETHICONE 160 MG: 80 TABLET, CHEWABLE ORAL at 09:09

## 2020-09-02 RX ADMIN — HYDROMORPHONE HYDROCHLORIDE 0.2 MG: 2 INJECTION, SOLUTION INTRAMUSCULAR; INTRAVENOUS; SUBCUTANEOUS at 10:09

## 2020-09-02 RX ADMIN — PROPOFOL 180 MG: 10 INJECTION, EMULSION INTRAVENOUS at 07:09

## 2020-09-02 RX ADMIN — SIMETHICONE CHEW TAB 80 MG 160 MG: 80 TABLET ORAL at 12:09

## 2020-09-02 RX ADMIN — ACETAMINOPHEN 1000 MG: 10 INJECTION, SOLUTION INTRAVENOUS at 07:09

## 2020-09-02 RX ADMIN — PHENYLEPHRINE HYDROCHLORIDE 10 MCG/MIN: 10 INJECTION INTRAVENOUS at 08:09

## 2020-09-02 RX ADMIN — HYDROMORPHONE HYDROCHLORIDE 0.2 MG: 2 INJECTION, SOLUTION INTRAMUSCULAR; INTRAVENOUS; SUBCUTANEOUS at 11:09

## 2020-09-02 RX ADMIN — ROCURONIUM BROMIDE 50 MG: 10 INJECTION, SOLUTION INTRAVENOUS at 07:09

## 2020-09-02 RX ADMIN — GLYCOPYRROLATE 0.4 MG: 0.2 INJECTION INTRAMUSCULAR; INTRAVENOUS at 11:09

## 2020-09-02 RX ADMIN — LIDOCAINE HYDROCHLORIDE 10 MG: 10 INJECTION, SOLUTION EPIDURAL; INFILTRATION; INTRACAUDAL; PERINEURAL at 06:09

## 2020-09-02 RX ADMIN — ONDANSETRON 4 MG: 2 INJECTION, SOLUTION INTRAMUSCULAR; INTRAVENOUS at 10:09

## 2020-09-02 RX ADMIN — DEXTROSE AND SODIUM CHLORIDE: 5; .45 INJECTION, SOLUTION INTRAVENOUS at 09:09

## 2020-09-02 RX ADMIN — POLYETHYLENE GLYCOL 3350 17 G: 17 POWDER, FOR SOLUTION ORAL at 09:09

## 2020-09-02 RX ADMIN — ACETAMINOPHEN 1000 MG: 10 INJECTION, SOLUTION INTRAVENOUS at 09:09

## 2020-09-02 RX ADMIN — SODIUM CHLORIDE, SODIUM GLUCONATE, SODIUM ACETATE, POTASSIUM CHLORIDE, MAGNESIUM CHLORIDE, SODIUM PHOSPHATE, DIBASIC, AND POTASSIUM PHOSPHATE: .53; .5; .37; .037; .03; .012; .00082 INJECTION, SOLUTION INTRAVENOUS at 09:09

## 2020-09-02 RX ADMIN — CIPROFLOXACIN 400 MG: 2 INJECTION, SOLUTION INTRAVENOUS at 06:09

## 2020-09-02 RX ADMIN — PHENYLEPHRINE HYDROCHLORIDE 200 MCG: 10 INJECTION INTRAVENOUS at 11:09

## 2020-09-02 RX ADMIN — LIDOCAINE HYDROCHLORIDE 60 MG: 20 INJECTION, SOLUTION INTRAVENOUS at 07:09

## 2020-09-02 RX ADMIN — ACETAMINOPHEN 1000 MG: 10 INJECTION, SOLUTION INTRAVENOUS at 02:09

## 2020-09-02 NOTE — PLAN OF CARE
Pt prepared for surgery resting in bed playing on his phone. Family signed up for text messages. Belongings brought to post op cabinet. Pt refused security to lock up wedding band and phone- placed in belongings bag.

## 2020-09-02 NOTE — PLAN OF CARE
VSS, pain tolerable after meds given and pt's position in bed readjusted, dressing to abdomen and robotic sites cdi, tolerated clear liquids without N/V, IV fluids infusing, steinberg catheter in place and flowing freely, michael TEDs and SCDs on. Notified Dr. Gomez of pt's post op lab results. Ok per Dr. Chambers to transfer the pt to the floor. Pt transported via bed to room 412. Pt's belongings at the bedside. Call light within reach. Report given to HARLAN Michelle and HARLAN Zimmer.

## 2020-09-02 NOTE — ANESTHESIA PREPROCEDURE EVALUATION
09/02/2020  Valentin Johnson is a 49 y.o., male.    Anesthesia Evaluation          Review of Systems  Anesthesia Hx:  No problems with previous Anesthesia   Social:  Non-Smoker    Cardiovascular:   Exercise tolerance: good    Pulmonary:  Pulmonary Normal    Renal/:   Right renal mass   Hepatic/GI:   GERD, well controlled    Musculoskeletal:  Spine Disorders: lumbar    Neurological:   Neuromuscular Disease,    Endocrine:  Endocrine Normal    Psych:  Psychiatric Normal           Physical Exam  General:  Well nourished    Airway/Jaw/Neck:  Airway Findings: Mouth Opening: Normal Tongue: Normal  General Airway Assessment: Adult  Mallampati: II  TM Distance: Normal, at least 6 cm       Chest/Lungs:  Chest/Lungs Clear    Heart/Vascular:  Heart Findings: Normal            Anesthesia Plan  Type of Anesthesia, risks & benefits discussed:  Anesthesia Type:  general  Patient's Preference:   Intra-op Monitoring Plan: standard ASA monitors  Intra-op Monitoring Plan Comments:   Post Op Pain Control Plan: multimodal analgesia and IV/PO Opioids PRN  Post Op Pain Control Plan Comments:   Induction:   IV  Beta Blocker:  Patient is not currently on a Beta-Blocker (No further documentation required).       Informed Consent: Patient understands risks and agrees with Anesthesia plan.  Questions answered. Anesthesia consent signed with patient.  ASA Score: 2     Day of Surgery Review of History & Physical:    H&P update referred to the surgeon.     Anesthesia Plan Notes: I have personally evaluated the patient and discussed risk/benefits/alternatives of general anesthesia.        Ready For Surgery From Anesthesia Perspective.

## 2020-09-02 NOTE — H&P
[facility] Urology New Patient/H&P:     Valentin Johnson is a 49 y.o. male who presents for evaluation of right renal mass     He was seen on 7/10 by MONIKA Stark in family medicine for LLQ pain and blood in stool with concerns for diverticulitis and lower GI bleed  CT ordered to workup and advised to follow up with GI     CT abdomen/pelvis w/wo contrast 7/10/20: 3.3 cm mass lower pole of the right kidney not fully characterized but not appearing a simple cyst with internal septations in could be complex cyst or solid mas. Prostate is 4cm. Diverticulosis without diverticulitis.  MRI ordered to further characterize as review of prior imaging noted 1/2018 ct only of L spine and 1/2016 abdominal CT noted to have no mass     7/13: Pt bleeding has subsided and he has not made an appointment with GI yet.  7/14 MRI w/wo contrast: In the lower half of the right kidney is a 3.3 x 2.7 cm mildly irregular solid mass.  This does not display signal dropout on out of phase imaging and therefore does not contain significant fat.  It is not MRI signal of a cyst.  There is only mild contrast enhancement out at 3 minutes.  It was not present on the CT of January 20, 2016.  This is most likely a renal neoplasm. Other masses are not seen in either kidney which show normal contrast enhancement.  Retroperitoneal adenopathy or masses are not seen.     He was seen by Dr Tomlin on 7/21 noting the mass at right lower pole but closer to the core of the kidney, and referred to discussed minimally invasive surgery  Noted at that visit to have fam hx CaP in father, and voids well, although with reduced stream force; has nocturia x 0-1. No urgency or abnormal frequency.   Uologic Hx includes vasectomy in 2012 with Dr Tomlin, empiric treatment for prostatitis in 2015 when presenting with obstructive LUTS, and trus/uroflow in 2018 to evaluate.  2015: nocturia and poor voiding stream with some hesitancy, intermittency and less pressure on his  stream.  2018: weak stream, frequency, nocturia. PVR 0cc -- trus 25.6g no medial lobe, and uroflow obstructed with voided volume 243cc with Qa/Qm 4/6cc/s and restricted curve  Recommended cysto but did not schedule     He presents today noting  Cr 1.1/eGFR >60 on 7/10/20; psa 0.94 in 2018  Sees GI tomorrow, Dr Velasquez. Has had prior endoscopy and has had brbpr before but not for that sig length of time.  Non/never smoker  No hematuria    Still some weak stream  Has gotten better about timed voiding  AUA SS:  6/1 (2:  Weak stream; 1:  Emptying, intermittency, straining, sleeping)  Lumbar spine imaging in 2018 on clear, however imaging for 2012 in 2016 reviewed without mass at that time.  Personal review imaging demonstrates 2.9 x 3.4 cm right mid to lower pole central endophytic mass abutting hilum  Family history of prostate ca in father, no fam Hx RCC/UC          Past Medical History:   Diagnosis Date    Colon polyp      GERD (gastroesophageal reflux disease)      Giardial enteritis      H. pylori infection 2015    Prostatitis                 Past Surgical History:   Procedure Laterality Date    CIRCUMCISION        COLONOSCOPY N/A 12/16/2015     Procedure: COLONOSCOPY;  Surgeon: Anton Matos MD;  [facility] ENDO;  Service: Endoscopy;  Laterality: N/A;    COLONOSCOPY N/A 8/14/2020     Procedure: COLONOSCOPY;  Surgeon: Christian Velasquez MD;  Location: [facility] ENDO;  Service: Endoscopy;  Laterality: N/A;    HERNIA REPAIR Right       inguinal    NASAL SEPTUM SURGERY         deviated septum, chronic sinusitis    UPPER GASTROINTESTINAL ENDOSCOPY                   Family History   Problem Relation Age of Onset    Hypertension Mother      Hypertension Father      Macular degeneration Father      Prostate cancer Father 70    Migraines Sister      Hypertension Brother      Obesity Brother      Macular degeneration Maternal Grandmother      Stroke Maternal Grandmother      Cancer Paternal  Grandfather           unknown    Glaucoma Neg Hx      Retinal detachment Neg Hx      Strabismus Neg Hx           Social History               Socioeconomic History    Marital status:        Spouse name: Not on file    Number of children: Not on file    Years of education: Not on file    Highest education level: Not on file   Occupational History    Occupation: sports medicine    Occupation:        Employer: [employer]       Comment: [employer]   Social Needs    Financial resource strain: Somewhat hard    Food insecurity       Worry: Never true       Inability: Never true    Transportation needs       Medical: No       Non-medical: No   Tobacco Use    Smoking status: Never Smoker    Smokeless tobacco: Never Used   Substance and Sexual Activity    Alcohol use: Yes       Frequency: 2-4 times a month       Drinks per session: 1 or 2       Binge frequency: Less than monthly       Comment: seldom    Drug use: No    Sexual activity: Yes       Partners: Female   Lifestyle    Physical activity       Days per week: 4 days       Minutes per session: 50 min    Stress: Only a little   Relationships    Social connections       Talks on phone: More than three times a week       Gets together: Once a week       Attends Sabianism service: Not on file       Active member of club or organization: Yes       Attends meetings of clubs or organizations: More than 4 times per year       Relationship status:    Other Topics Concern    Not on file   Social History Narrative    Not on file                 Review of patient's allergies indicates:   Allergen Reactions    Penicillins         Childhood allergy. Reaction unknown.         Medications Reviewed: see MAR     ROS:     Constitutional: denies fevers, chills, night sweats, fatigue, malaise  Respiratory: negative for cough, shortness of breath, wheezing, dyspnea.  Cardiovascular: negative for chest pain, varicose  veins, ankle swelling, palpitations, syncope.  GI: negative for abdominal pain, heartburn, indigestion, nausea, vomiting, constipation, diarrhea, blood in stool.   Urology: as noted above in HPI  Endocrinology: negative for cold intolerance, excessive thirst, not feeling tired/sluggish, no heat intolerance.   Hematology/Lymph: negative for easy bleeding, easy bruising, swollen glands.  Musculoskeletal: negative for back pain, joint pain, joint swelling, neck pain.  Allergy-Immunology: negative for seasonal allergies, negative for unusual infections.   Skin: negative for boils, breast lumps, hives, itching, rash.   Neurology: negative for, dizziness, headache, tingling/numbness, tremors.   Psych: satisfied with life; negative for, anxiety, depression, suicidal thoughts.      PHYSICAL EXAM:         Vitals:     07/28/20 0916   BP: 135/89   Pulse: 69   Temp: 98.6 °F (37 °C)      Body mass index is 26.58 kg/m². Weight: 88.9 kg (195 lb 15.8 oz) Height: 6' (182.9 cm)         General: Alert, cooperative, no distress, appears stated age  Head: Normocephalic, without obvious abnormality, atraumatic  Neck: no masses, no thyromegaly, no lymphadenopathy  Eyes: PERRL, conjunctiva/corneas clear  Lungs: Respirations unlabored, normal effort, no accessory muscle use  CV: Warm and well perfused extremities  Abdomen: Soft, non-tender, no CVA tenderness, no hepatosplenomegaly, no hernia  Penis: phallus normal, circumcised, well cared for, no plaques or lesions.   Scrotum: no cysts, no lesions, no rash, no hydrocele.   Epididymes: normal, nontender, symmetrical, no masses or cysts.   Testes: normal, both descended, no masses.   Urethra: palpably normal with orthotopic meatus of normal size    NGUYEN: normal sphincter tone, no masses, no hemmorrhoids   PROSTATE: 15-20g, no nodules, non-tender, symmetrical.   Extremities: Extremities normal, atraumatic, no cyanosis or edema  Skin: Normal color, texture, and turgor, no rashes or  lesions  Psych: Appropriate, well oriented, normal affect, normal mood  Neuro: Non-focal        Assessment/Diagnosis:     1. Right renal mass  POCT URINE DIPSTICK WITHOUT MICROSCOPE     Diet NPO     Case Request Operating Room: ROBOTIC NEPHRECTOMY, ROBOTIC NEPHRECTOMY, PARTIAL     Admit to Inpatient     Insert peripheral IV     Reason for No Pharmacological VTE Prophylaxis     Place MILADYS hose     Place sequential compression device     Comprehensive metabolic panel     CBC auto differential     Protime-INR     Urinalysis     Urine culture     Prepare RBC 2 Units; preop     CANCELED: Type & Screen     CANCELED: EKG 12-lead   2. Prostate cancer screening  PSA, Screening         Plans:  In review of imaging with the patient, I did delineate his concerning solid enhancing right mid to lower pole renal mass  We did discuss that approximately 80% of enhancing renal masses represent a renal cell carcinoma, and surgical excision of these masses is the preferred treatment strategy, with nephron-sparing approach/partial nephrectomy preferred for small renal masses < 3-4 cm. We did discuss alternative options such as active surveillance, and ablative techniques such as percutaneous or laparoscopic cryoablation. In general, these alternative therapies are reserved for patients with increased comorbidities or at higher surgical risk. We also discussed higher recurrence rates with ablative vs. extirpative therapy, and that resection of T1a masses has excellent oncologic outcomes and is 90%+ curative with no further adjuvant therapies necessary. In discussing that the goal of partial nephrectomies (vs. Radical nephrectomies) is to maximize long-term renal function, they are associated with a slightly increased risk of nicholas-procedural complications, which we discussed in detail.     We did however extensively review his imaging denoting the central endophytic nature of this mass abutting the hilum and collecting system.  Certainly  given the challenging anatomy of this mass, partial nephrectomy may not be possible, thus necessitating radical nephrectomy and we did have an extensive risk benefit discussion about both options.  Given location of the mass, we discussed the use of intraoperative ultrasound to help localize and determine margins, and if reasonable to resect without compromising hilar collecting system structures, would attempt partial nephrectomy, which would likely be more of a lower pole heminephrectomy.  Of course review the anatomic challenges to this, and prepared the patient for the likely possibility of radical nephrectomy, though will take efforts as above at nerve-sparing approach if safe/possible to do so.     After thorough discussion of his options, he elected to proceed with right robotic partial vs radical nephrectomy.   He understands the greater than 50-60% chance conversion to radical nephrectomy given the location of mass and anatomic considerations.     We did discuss risks and benefits of the procedure including but not limited to pain, infection, bleeding, scarring, injury to surrounding intra-abdominal structures, vascular injury, need for removal of entire kidney, fistula or pseudoaneurysm, declining renal function, need for blood transfusion, need to convert to open procedure, urine leak or urinary fistula. We discussed the need for clamping of the renal artery for warm ischemia time, noting that the warm ischemia time will be kept to a minimum, but the longer the warm ischemia time the more potential for renal damage. We discussed the postoperative course in detail including hospital stay, Wilson catheter, DYLAN drain.  Also discussed the differences in risk in recovery for radical nephrectomy, and all questions answered, and ppropriate informed consent was obtained.  A reviewed overnight stay in the hospital, and the potential for 2 nights in the hospital of partial nephrectomy is performed to monitor for  bleeding and drain outputs, though in a young healthy patient is also possible to be discharged home on postop day 1 if there were no complications     Right robot assisted laparoscopic partial versus radical nephrectomy on 8/26/20  PAT 10-12d prior.  Patient will have labs done with [facility] before preop visit, so will just need his urine, type and cross, and EKG at preop.         Reviewed plan for partial vs radical in preop holding noting high likelihood of radical nephrectomy given tumor location, but will isolate and ultrasound and perform partial if possible.  All questions answered and site marked

## 2020-09-02 NOTE — TRANSFER OF CARE
Anesthesia Transfer of Care Note    Patient: Valentin Johnson    Procedure(s) Performed: Procedure(s) (LRB):  ROBOTIC RADICAL NEPHRECTOMY (Right)    Patient location: PACU    Anesthesia Type: general    Transport from OR: Transported from OR on 2-3 L/min O2 by NC with adequate spontaneous ventilation    Post pain: adequate analgesia    Post assessment: no apparent anesthetic complications    Post vital signs: stable    Level of consciousness: responds to stimulation    Nausea/Vomiting: no nausea/vomiting    Complications: none    Transfer of care protocol was followed      Last vitals:   Visit Vitals  /79   Pulse 64   Temp 36.6 °C (97.9 °F)   Resp 18   Ht 6' (1.829 m)   Wt 86.2 kg (190 lb)   SpO2 100%   BMI 25.77 kg/m²

## 2020-09-02 NOTE — NURSING
Pt to 412. Stable. IVF infusing. scd in use. Wilson to gravity. Incisions cdi. Tolerating clears. Updated on plan of care. monitoring

## 2020-09-02 NOTE — CARE UPDATE
09/02/20 1543   Patient Assessment/Suction   Level of Consciousness (AVPU) alert   All Lung Fields Breath Sounds clear   PRE-TX-O2   O2 Device (Oxygen Therapy) room air   SpO2 98 %   Pulse Oximetry Type Intermittent   $ Pulse Oximetry - Multiple Charge Pulse Oximetry - Multiple   Pulse 80   Resp 16   Incentive Spirometer   $ Incentive Spirometer Charges done with encouragement   Incentive Spirometer Predicted Level (mL) 2800   Administration (IS) instruction provided, initial   Number of Repetitions (IS) 10   Level Incentive Spirometer (mL) 1500   Patient Tolerance (IS) good

## 2020-09-02 NOTE — ANESTHESIA PROCEDURE NOTES
Intubation  Performed by: Yevgeniy Collado CRNA  Authorized by: Yevgeniy Collado CRNA     Intubation:     Induction:  Intravenous    Intubated:  Postinduction    Mask Ventilation:  Easy with oral airway    Attempts:  1    Attempted By:  Student    Method of Intubation:  Direct    Blade:  Melissa 2    Laryngeal View Grade: Grade I - full view of chords      Difficult Airway Encountered?: No      Complications:  None    Airway Device:  Oral endotracheal tube    Airway Device Size:  7.5    Style/Cuff Inflation:  Cuffed    Inflation Amount (mL):  6    Tube secured:  22    Secured at:  The teeth    Placement Verified By:  Capnometry and Revisualization with laryngoscopy    Complicating Factors:  None    Findings Post-Intubation:  BS equal bilateral

## 2020-09-02 NOTE — BRIEF OP NOTE
[facility] Brief Operative Note    Date: 09/02/2020    Staff Surgeon: Corby Gomez MD    Bedside Assistant: MICHELLE June    Pre-Op Diagnosis: right renal mass    Post-Op Diagnosis: right renal mass    Procedure(s) Performed:   Right robot-assisted laparoscopic radical nephrectomy  Intraoperative ultrasound localization of renal mass    Specimen(s): right kidney and proximal ureter    Anesthesia: GETA    Findings: medial endophytic mid to lower pole mass with no ultrasound visible plane of resection from collecting system/renal pelvis and nearby hilum     Estimated Blood Loss: 25cc    Drains: 18 steinberg    Complications: none    Disposition: pacu

## 2020-09-03 ENCOUNTER — TELEPHONE (OUTPATIENT)
Dept: UROLOGY | Facility: CLINIC | Age: 50
End: 2020-09-03

## 2020-09-03 VITALS
SYSTOLIC BLOOD PRESSURE: 143 MMHG | BODY MASS INDEX: 25.73 KG/M2 | TEMPERATURE: 98 F | HEIGHT: 72 IN | RESPIRATION RATE: 17 BRPM | OXYGEN SATURATION: 97 % | DIASTOLIC BLOOD PRESSURE: 76 MMHG | HEART RATE: 44 BPM | WEIGHT: 190 LBS

## 2020-09-03 LAB
ANION GAP SERPL CALC-SCNC: 10 MMOL/L (ref 8–16)
ANION GAP SERPL CALC-SCNC: 8 MMOL/L (ref 8–16)
BASOPHILS # BLD AUTO: 0.01 K/UL (ref 0–0.2)
BASOPHILS NFR BLD: 0.1 % (ref 0–1.9)
BUN SERPL-MCNC: 16 MG/DL (ref 6–20)
BUN SERPL-MCNC: 17 MG/DL (ref 6–20)
CALCIUM SERPL-MCNC: 8.4 MG/DL (ref 8.7–10.5)
CALCIUM SERPL-MCNC: 8.8 MG/DL (ref 8.7–10.5)
CHLORIDE SERPL-SCNC: 104 MMOL/L (ref 95–110)
CHLORIDE SERPL-SCNC: 104 MMOL/L (ref 95–110)
CO2 SERPL-SCNC: 25 MMOL/L (ref 23–29)
CO2 SERPL-SCNC: 28 MMOL/L (ref 23–29)
CREAT SERPL-MCNC: 1.6 MG/DL (ref 0.5–1.4)
CREAT SERPL-MCNC: 1.7 MG/DL (ref 0.5–1.4)
DIFFERENTIAL METHOD: ABNORMAL
EOSINOPHIL # BLD AUTO: 0 K/UL (ref 0–0.5)
EOSINOPHIL NFR BLD: 0.1 % (ref 0–8)
ERYTHROCYTE [DISTWIDTH] IN BLOOD BY AUTOMATED COUNT: 13.9 % (ref 11.5–14.5)
EST. GFR  (AFRICAN AMERICAN): 54 ML/MIN/1.73 M^2
EST. GFR  (AFRICAN AMERICAN): 58 ML/MIN/1.73 M^2
EST. GFR  (NON AFRICAN AMERICAN): 46 ML/MIN/1.73 M^2
EST. GFR  (NON AFRICAN AMERICAN): 50 ML/MIN/1.73 M^2
GLUCOSE SERPL-MCNC: 115 MG/DL (ref 70–110)
GLUCOSE SERPL-MCNC: 94 MG/DL (ref 70–110)
HCT VFR BLD AUTO: 39.2 % (ref 40–54)
HGB BLD-MCNC: 12.8 G/DL (ref 14–18)
IMM GRANULOCYTES # BLD AUTO: 0.04 K/UL (ref 0–0.04)
IMM GRANULOCYTES NFR BLD AUTO: 0.3 % (ref 0–0.5)
LYMPHOCYTES # BLD AUTO: 1.1 K/UL (ref 1–4.8)
LYMPHOCYTES NFR BLD: 9.2 % (ref 18–48)
MAGNESIUM SERPL-MCNC: 2 MG/DL (ref 1.6–2.6)
MCH RBC QN AUTO: 29.8 PG (ref 27–31)
MCHC RBC AUTO-ENTMCNC: 32.7 G/DL (ref 32–36)
MCV RBC AUTO: 91 FL (ref 82–98)
MONOCYTES # BLD AUTO: 1 K/UL (ref 0.3–1)
MONOCYTES NFR BLD: 8.2 % (ref 4–15)
NEUTROPHILS # BLD AUTO: 9.7 K/UL (ref 1.8–7.7)
NEUTROPHILS NFR BLD: 82.1 % (ref 38–73)
NRBC BLD-RTO: 0 /100 WBC
PHOSPHATE SERPL-MCNC: 4.3 MG/DL (ref 2.7–4.5)
PLATELET # BLD AUTO: 234 K/UL (ref 150–350)
PMV BLD AUTO: 9.6 FL (ref 9.2–12.9)
POTASSIUM SERPL-SCNC: 3.6 MMOL/L (ref 3.5–5.1)
POTASSIUM SERPL-SCNC: 4.3 MMOL/L (ref 3.5–5.1)
RBC # BLD AUTO: 4.3 M/UL (ref 4.6–6.2)
SODIUM SERPL-SCNC: 139 MMOL/L (ref 136–145)
SODIUM SERPL-SCNC: 140 MMOL/L (ref 136–145)
WBC # BLD AUTO: 11.79 K/UL (ref 3.9–12.7)

## 2020-09-03 PROCEDURE — 25000003 PHARM REV CODE 250: Performed by: UROLOGY

## 2020-09-03 PROCEDURE — 84100 ASSAY OF PHOSPHORUS: CPT

## 2020-09-03 PROCEDURE — 94799 UNLISTED PULMONARY SVC/PX: CPT

## 2020-09-03 PROCEDURE — 36415 COLL VENOUS BLD VENIPUNCTURE: CPT

## 2020-09-03 PROCEDURE — 83735 ASSAY OF MAGNESIUM: CPT

## 2020-09-03 PROCEDURE — 63600175 PHARM REV CODE 636 W HCPCS: Performed by: UROLOGY

## 2020-09-03 PROCEDURE — 94761 N-INVAS EAR/PLS OXIMETRY MLT: CPT

## 2020-09-03 PROCEDURE — 80048 BASIC METABOLIC PNL TOTAL CA: CPT | Mod: 91

## 2020-09-03 PROCEDURE — 85025 COMPLETE CBC W/AUTO DIFF WBC: CPT

## 2020-09-03 PROCEDURE — S5010 5% DEXTROSE AND 0.45% SALINE: HCPCS | Performed by: UROLOGY

## 2020-09-03 RX ORDER — ACETAMINOPHEN 325 MG/1
650 TABLET ORAL EVERY 6 HOURS PRN
Refills: 0 | COMMUNITY
Start: 2020-09-03 | End: 2021-01-15 | Stop reason: ALTCHOICE

## 2020-09-03 RX ORDER — POLYETHYLENE GLYCOL 3350 17 G/17G
17 POWDER, FOR SOLUTION ORAL DAILY
Refills: 0 | COMMUNITY
Start: 2020-09-03 | End: 2021-01-15 | Stop reason: ALTCHOICE

## 2020-09-03 RX ORDER — SIMETHICONE 80 MG
80 TABLET,CHEWABLE ORAL 3 TIMES DAILY PRN
Refills: 0 | COMMUNITY
Start: 2020-09-03 | End: 2021-01-15 | Stop reason: ALTCHOICE

## 2020-09-03 RX ORDER — TRAMADOL HYDROCHLORIDE 50 MG/1
50 TABLET ORAL EVERY 6 HOURS PRN
Qty: 10 TABLET | Refills: 0 | Status: SHIPPED | OUTPATIENT
Start: 2020-09-03 | End: 2021-01-15 | Stop reason: ALTCHOICE

## 2020-09-03 RX ORDER — DOCUSATE SODIUM 100 MG/1
100 CAPSULE, LIQUID FILLED ORAL 2 TIMES DAILY
Refills: 0 | COMMUNITY
Start: 2020-09-03 | End: 2021-01-15 | Stop reason: ALTCHOICE

## 2020-09-03 RX ADMIN — DEXTROSE AND SODIUM CHLORIDE: 5; .45 INJECTION, SOLUTION INTRAVENOUS at 06:09

## 2020-09-03 RX ADMIN — FAMOTIDINE 20 MG: 20 TABLET ORAL at 08:09

## 2020-09-03 RX ADMIN — POLYETHYLENE GLYCOL 3350 17 G: 17 POWDER, FOR SOLUTION ORAL at 08:09

## 2020-09-03 RX ADMIN — DOCUSATE SODIUM 100 MG: 100 CAPSULE, LIQUID FILLED ORAL at 08:09

## 2020-09-03 RX ADMIN — ACETAMINOPHEN 650 MG: 325 TABLET ORAL at 08:09

## 2020-09-03 RX ADMIN — SIMETHICONE 160 MG: 80 TABLET, CHEWABLE ORAL at 08:09

## 2020-09-03 RX ADMIN — KETOROLAC TROMETHAMINE 15 MG: 30 INJECTION, SOLUTION INTRAMUSCULAR at 06:09

## 2020-09-03 NOTE — NURSING
Patient care assumed. Patient lying in bed in nad. VS stable. States pain 3/10. Wilson with clear yellow urine. Tolerating clear liquids. IS @ bs, encouraged. Call light within reach.

## 2020-09-03 NOTE — PLAN OF CARE
POC discussed with patient, verbalized understanding. Patient with uneventful night, slept off and on between care. VS stable. Adequate urine output in steinberg. Tolerating clear liquids well. Minimal abdominal surgical pain adequately controlled with routine Toradol. 4 lap sites and midline abdominal dressing CDI. IS @ bedside, up to 1500, continued encouragement. Ambulated in halls twice. IV fluids infusing. Call light at bedside.

## 2020-09-03 NOTE — PROGRESS NOTES
Postop labs normal so used low dose toradol q8 x 2 doses  Cr up from 1.3 to 1.7 today  Also 6L UOP  Likely element of hyperfiltration  No further toradol ordered  DCed cipro for renal function though dose had started at 614, notified selin marie 624 to DC cipro dose infusing.  Will keep IVF going a MIVF rate and resume diet, encourage PO hydration, change to low sodium diet, and recheck labs noon

## 2020-09-03 NOTE — ANESTHESIA POSTPROCEDURE EVALUATION
Anesthesia Post Evaluation    Patient: Valentin Johnson    Procedure(s) Performed: Procedure(s) (LRB):  ROBOTIC RADICAL NEPHRECTOMY (Right)    Final Anesthesia Type: general    Patient location during evaluation: PACU  Patient participation: Yes- Able to Participate  Level of consciousness: awake and alert and oriented  Post-procedure vital signs: reviewed and stable  Pain management: adequate  Airway patency: patent    PONV status at discharge: No PONV  Anesthetic complications: no      Cardiovascular status: blood pressure returned to baseline  Respiratory status: unassisted, spontaneous ventilation and room air  Hydration status: euvolemic  Follow-up not needed.          Vitals Value Taken Time   /62 09/03/20 0741   Temp 36.5 °C (97.7 °F) 09/03/20 0741   Pulse 44 09/03/20 0755   Resp 16 09/03/20 0755   SpO2 100 % 09/03/20 0755         Event Time   Out of Recovery 13:20:00         Pain/Mariam Score: Pain Rating Prior to Med Admin: 4 (9/3/2020  8:45 AM)  Pain Rating Post Med Admin: 2 (9/3/2020  9:45 AM)  Mariam Score: 10 (9/2/2020  1:15 PM)

## 2020-09-03 NOTE — NURSING
Ambulated around the halls X 1, tolerated well. Resting easily on rounds. Tolerating clear liquids, states that is all he wants tonight.

## 2020-09-03 NOTE — CARE UPDATE
09/03/20 0755   Patient Assessment/Suction   Level of Consciousness (AVPU) alert   All Lung Fields Breath Sounds clear   PRE-TX-O2   O2 Device (Oxygen Therapy) room air   SpO2 100 %   Pulse Oximetry Type Intermittent   $ Pulse Oximetry - Multiple Charge Pulse Oximetry - Multiple   Pulse (!) 44   Resp 16   Incentive Spirometer   $ Incentive Spirometer Charges done with encouragement   Incentive Spirometer Predicted Level (mL) 2800   Administration (IS) instruction provided, follow-up   Number of Repetitions (IS) 10   Level Incentive Spirometer (mL) 1250   Patient Tolerance (IS) good

## 2020-09-03 NOTE — NURSING
Discharge instructions reviewed with pt. Low sodium diet, medications reviewed. PIV removed. Pt aware of follow up with Dr. Gomez. Pt aware of rxs , all questions answered, waiting for ride.

## 2020-09-04 NOTE — PLAN OF CARE
09/04/20 0804   Final Note   Assessment Type Final Discharge Note   Anticipated Discharge Disposition Home

## 2020-09-04 NOTE — DISCHARGE SUMMARY
[facility]  Urology  Discharge Summary      Patient Name: Valentin Johnson  MRN: [id number]  Admission Date: 9/2/2020  Hospital Length of Stay: 1 days  Discharge Date and Time: 9/3/2020  3:42 PM  Attending Physician: Corby Gomez MD  Discharging Provider: Corby Gomez MD  Primary Care Physician: ALLISON Castañeda MD    HPI:   50 yo M found to have approximate 3cm lower pole endophytic central solid enhancing right renal mass concerning for malignancy. After extensive discussion and imaging review, pt elected to proceed with robot-assisted laparascopic partial vs radical nephrectomy, understanding high likelihood of radical given tumor location.      Procedure(s) (LRB):  ROBOTIC RADICAL NEPHRECTOMY (Right)   With intraoperative ulltrasound localization of renal mass    Indwelling Lines/Drains at time of discharge:   Lines/Drains/Airways     None                 Hospital Course   Uncomplicated procedure.  Intraop ultrasound to visualize tumor, adjacent anatomic, and assess for surgical planes confirmed necessity of radical nephrectomy.  Uncomplicated procedure, and convalesced well on med surg floor.     Postop utilized tylenol and simethicone only and 1 oral tramadol in attempt at NSAID free narcotic sparing protocol until postop renal function assessed. Postop Cr 1.3, eGFR >60 without any comorbidities so utilized toradol Q8 rather than Q6 x3 doses.    UOP overnight 5-6L. Likely element of hyperfiltration of solitary L kidney, and AM Cr 1.7. No further toradol, stopped AM cipro dose before completion to decrease dose, no further nsaids, renal diet. Continued MIVF. Recheck Cr improved to 1.6 afternoon POD 1 at which time was ambulatory, passing flatus, VSS, abd soft NT ND except mild ttp at extraction site incision, will alll incision sites CDI. No complaints, Voided spontaneously in absence of steinberg. DCed home.      Pending Diagnostic Studies:     Procedure Component Value Units  "Date/Time    Specimen to Pathology, Surgery Urology [[id number]] Collected: 09/02/20 1113    Order Status: Sent Lab Status: In process Updated: 09/02/20 1203          Final Active Diagnoses:    Diagnosis Date Noted POA    PRINCIPAL PROBLEM:  Right renal mass [N28.89] 09/02/2020 Yes      Problems Resolved During this Admission:         Discharged Condition: good    Disposition: Home or Self Care    Follow Up:  Follow-up Information     Corby Gomez MD On 9/15/2020.    Specialty: Urology  Why: as scheduled -- ** office will arrange repeat labs prior  Contact information:  [address] [place] [address] [address]  [place] [id number]  [phone]                 Patient Instructions:      Basic metabolic panel   Standing Status: Future Standing Exp. Date: 11/02/21     Diet Adult Regular   Order Comments: Low sodium     Activity as tolerated   Order Comments: If no BM in 3 days, take milk of magnesia as directed. Do not push or strain.   Continue stool softeners and miralax until regular again  No driving if taking Rx pain meds. Use tylenol before resorting to home ultram/tramadol  No lifting/pushing/pulling >10 lbs x 4 weeks  No aspirin/fish oil/vitamin E x3 days   Ok to shower, pat incisions dry. No baths/soaks  Can help "surgical glue" peel off in shower starting in 1 week once starts to flake up. Stitches will dissolve on their own.  Drink a lot  of water to keep urine clear  Low salt/low sodium diet.   Avoid NSAIDs (advil/aleve/motrin)     Medications:  Reconciled Home Medications:      Medication List      START taking these medications    acetaminophen 325 MG tablet  Commonly known as: TYLENOL  Take 2 tablets (650 mg total) by mouth every 6 (six) hours as needed.     docusate sodium 100 MG capsule  Commonly known as: COLACE  Take 1 capsule (100 mg total) by mouth 2 (two) times daily.     polyethylene glycol 17 gram Pwpk  Commonly known as: GLYCOLAX  Take 17 g by mouth once daily.     simethicone 80 MG chewable " tablet  Commonly known as: MYLICON  Take 1 tablet (80 mg total) by mouth 3 (three) times daily as needed (gas pain bloating).     traMADoL 50 mg tablet  Commonly known as: ULTRAM  Take 1 tablet (50 mg total) by mouth every 6 (six) hours as needed (pain not relieved by otc agents).        CONTINUE taking these medications    multivitamin per tablet  Commonly known as: THERAGRAN  Take 1 tablet by mouth once daily.     vitamin D 1000 units Tab  Commonly known as: VITAMIN D3  Take 1,000 Units by mouth once daily.     vitamin E 100 UNIT capsule  Take 100 Units by mouth once daily.            Time spent on the discharge of patient: 30 minutes    Corby Gomez MD  Urology  [facility]

## 2020-09-05 NOTE — OP NOTE
[facility] Urology Operative Report     Date: 09/02/2020     Staff Surgeon: Corby Gomez MD     Bedside Assistant: MICHELLE June     Pre-Op Diagnosis: right renal mass     Post-Op Diagnosis: right renal mass     Procedure(s) Performed:   Right robot-assisted laparoscopic radical nephrectomy, adrenal-sparing  Intraoperative ultrasound localization of renal mass     Specimen(s): right kidney and proximal ureter     Anesthesia: General endotracheal anesthesia (and 0.5% marcaine local anesthetic injection into incisions)     Findings: medial endophytic mid to lower pole 3cm mass with no ultrasound visible plane of resection from collecting system/renal pelvis and nearby hilum      Estimated Blood Loss: <20cc     Drains: 18 steinberg     Complications: none    INDICATIONS FOR PROCEDURE:  Mr Johnson is a 48 yo M found to have approximate 3cm mid to lower pole endophytic central solid enhancing right renal mass concerning for malignancy. After extensive discussion and imaging review, pt elected to proceed with robot-assisted laparascopic partial vs radical nephrectomy. All risks and benefits of either procedure were discussed with the patient in detail, all questions answered and appropriate informed consent obtained.     PROCEDURE IN DETAIL:  After obtaining fully informed consent, the patient was prepped and draped in the right modified flank position after placing an 18fr steinberg catheter under sterile technique. He was then secured to the bed and all appropriate pressure points were padded. A test roll was performed prior to the procedure. Preoperative antibiotics were given and a WHO timeout was performed.      A 12-mm incision was made cephalad and lateral to the patient's umbilicus on his right abdomen to place the 12-mm camera trocar, which was placed using visiport technique direct vision with 10mm endoscope to ensure safe access into abdomen, after obtaining pneumoperitoneum at this site to 15mmHg with veress  needle, after aborted attempt at insufflation with percutaneous veress in subcostal plan.  We triangulated and placed 2 additional 8-mm robotic trocars. A 12 mm superior assistant port was placed in line with the hilum and in patient's midline, and a 5 mm inferior assistant port was placed as well.  All ports were placed under direct vision safely, and once all ports were placed the robot was docked over the patient's right shoulder     Once the robot was docked, we then incised the line of Toldt, dropping the right colon from the field.  It should also be noted that colon had mild lateral wall adhesions, and appendix was prominent and also had some tissue adhesions near lower pole which were carefully lysed on cold cutting current to drop it out of the operative field.  The white line was followed around the lower pole to drop the colon medially and a recent around upper pole, also taking down hepatic of renal attachments.  Upon dropping the colon medially, the duodenum was identified and kocherized so it was also reflected medially away from the operative field.     We then began by elevating the lower pole of the kidney.  After bowel was dropped, the gonadal vein was seen distinctly in the medial fat, and the ureter was seen peristalsing.  A horizontal scoring mayra was performed using monopolar scissors. Sweeping laterally, we were able to identify the ureter over the psoas and clearly see psoas muscle and tendon. The assistant suction was used to retract the ureter towards the anterior abdominal wall, placing upward traction on the lower pole of the kidney.  Gonadal was identified and dropped medially and was able to remain intact     We then systematically marched up from the lower pole of the kidney to the dissection of the hilum. Small pockets of tissue were cauterized using the bipolar to assist in gaining hemostatic exposure to the renal vasculature. The renal vein was identified and dissected free. After  dissection of the borders of the renal vein, the artery pulsated inferomedially, almost adjacent, and was identified and meticulously dissected free.  Extensive arteriolysis performed until gained circumferential access around the renal artery so that two bulldog clamps could be placed around it, after which point turned attention to localizing renal mass.     Drop in ultrasound probe was passed through the 12 mm system port and ProGrasp robotic instrument was used to perform intraoperative robotic assisted laparoscopic guided ultrasound localization of the renal mass.  Given its endophytic central location along collecting system and hilum, Gerotafascia was not unroofed a over the medial inferior surface of the kidney, and rather the ultrasound was used with a depth of 8 cm to localize the renal mass and assess for any plane of resection.  The renal mass was clearly discrete round and distinct from surrounding parenchymal tissue.  There was some heterogeneity in the circumscribed area, and no central stellate appearance.  No plane was seen from the superior border of the mass to the renal pelvis and collecting system, and the hilar vessels or just adjacent to that.  Knowing that the mass was near to the collecting system, ureteral lysis was performed to move the ureter more medially and trace it up to the UPJ, and ultrasound was also used to follow the ureter up to the UPJ and renal pelvis to distinctly isolate the collecting system on ultrasound images, still noting that the mass was adherent and adjacent to it without any intervening parenchymal or sinus fat plane, thus eliminating partial nephrectomy or lower pole heminephrectomy as an option.  Ultrasound probe was removed and we proceeded with a radical nephrectomy.   Intraoperative photographs from ultrasound image and scope were used to document these anatomic challenges and will be placed with the patient's chart.      More medial dissection of the renal  artery and vein were performed until there was enough space for stapler to pass beyond each.  A 35 mm powered endovascular stapler was brought onto the field through the assistant port in used to hemostatically transect the renal artery at this time.  Staple line clean and noted to be hemostatic, and the stapler was passed back in to execute a staple load to hemostatically transect the renal vein.  There was a complex network smaller vascular branches in the adherent medial fat between the artery and vein, which were hemostatically taken with LigaSure at this time     At this point, with the hilum transected, the lower pole free, and most of the medial tissue free as well, we focused on the remaining attachments to the kidney. Attention was turned back to the lower pole and the ureter was identified and skeletonized free of surrounding periureteral tissue.  The ureter was transected hemostatically using LigaSure between hemolock clips.  Adrenal tissue was identified and LigaSure dissection was performed around the upper pole in an adrenal sparing fashion.  Lateral attachments were then taken down largely bluntly tracing from the upper pole towards the lower pole.  Attention was turned medially to facilitate dissection of the upper pole of the kidney away from the adrenal gland.  LigaSure was used to hemostatically take the upper pole attachments after which the kidney was freely mobile.  At this time a 15 mm Endo Catch bag was passed into the abdomen and used to ensnare the kidney and proximal ureter. At this time the staple lines were inspected and found to be hemostatic and the bed of resection was irrigated and suctioned free of any bleeding.     At this time the robot was undocked and laparoscopically using the endoscope and assistant ports, the Endo-Catch bag string was emanating from the midline 12 mm assistant port and the abdomen was desufflated.  This incision was extended vertically about 3-4 cm along  midline, and Bovie electrocautery was used to dissect down through fat and fascial layers to enter the abdomen. The Endo Catch bag with specimen within it was manually removed.   The fascia was then closed in a running fashion using a 0 PDS suture.    Once the fascia was closed, the abdomen was reinsufflated to not only test fascial closure, but also to reinspect the hilum which was again noted to be hemostatic.  Attention was then turned at this time with a 5 mm endoscope through robotic port towards the 12 mm camera port of which the fascia was closed using laparoscopic assistance and Jacinto Thomasen type cross bow closing device to place PDS fascial suture to close the fascia of the camera port.  Once the fascial suture was in place the abdomen was desufflated, and all ports removed, and fascial suture tied down.     The extraction site incision was then copiously irrigated and closed in multiple layers using 2 Vicryl deep dermal sutures in a layer just overlying the fascial closure, and then skin incision 4 0 Monocryl subcuticular suture. This was repeated at camera port site, then all remaining skin incisions closed with 4 0 monocryl subcuticular suture.  Before closure, 0.5% Marcaine was injected into all of the incisions, including at the fascial level at the extraction site incision as well as skin level.  Dermabond was applied over all the incisions and after it dried, pressure dressing of 4 x 8 and tegaderm was applied to extraction site incision. His Wilson catheter was secured to thigh with a StatLock and placed to gravity drainage.       The patient tolerated the procedure well, there were no complications, and he was extubated and taken to PACU in stable condition.          Disposition:  The patient will be kept overnight.  Wilson catheter will be removed in the morning.  Discharge is pending ambulation, tolerating regular diet, by mouth pain control, return of bowel function. Expected POD 1

## 2020-09-06 LAB
BLD PROD TYP BPU: NORMAL
BLD PROD TYP BPU: NORMAL
BLOOD UNIT EXPIRATION DATE: NORMAL
BLOOD UNIT EXPIRATION DATE: NORMAL
BLOOD UNIT TYPE CODE: 6200
BLOOD UNIT TYPE CODE: 6200
BLOOD UNIT TYPE: NORMAL
BLOOD UNIT TYPE: NORMAL
CODING SYSTEM: NORMAL
CODING SYSTEM: NORMAL
DISPENSE STATUS: NORMAL
DISPENSE STATUS: NORMAL
NUM UNITS TRANS PACKED RBC: NORMAL
NUM UNITS TRANS PACKED RBC: NORMAL

## 2020-09-11 LAB
FINAL PATHOLOGIC DIAGNOSIS: NORMAL
GROSS: NORMAL
Lab: NORMAL
MICROSCOPIC EXAM: NORMAL

## 2020-09-13 ENCOUNTER — PATIENT MESSAGE (OUTPATIENT)
Dept: UROLOGY | Facility: CLINIC | Age: 50
End: 2020-09-13

## 2020-09-14 ENCOUNTER — LAB VISIT (OUTPATIENT)
Dept: LAB | Facility: HOSPITAL | Age: 50
End: 2020-09-14
Attending: UROLOGY
Payer: COMMERCIAL

## 2020-09-14 DIAGNOSIS — N18.30 CKD (CHRONIC KIDNEY DISEASE) STAGE 3, GFR 30-59 ML/MIN: ICD-10-CM

## 2020-09-14 DIAGNOSIS — N28.89 RIGHT RENAL MASS: ICD-10-CM

## 2020-09-14 DIAGNOSIS — Z90.5 ACQUIRED SOLITARY KIDNEY: Primary | ICD-10-CM

## 2020-09-14 LAB
ANION GAP SERPL CALC-SCNC: 6 MMOL/L (ref 8–16)
BUN SERPL-MCNC: 19 MG/DL (ref 6–20)
CALCIUM SERPL-MCNC: 10 MG/DL (ref 8.7–10.5)
CHLORIDE SERPL-SCNC: 100 MMOL/L (ref 95–110)
CO2 SERPL-SCNC: 32 MMOL/L (ref 23–29)
CREAT SERPL-MCNC: 1.6 MG/DL (ref 0.5–1.4)
EST. GFR  (AFRICAN AMERICAN): 58 ML/MIN/1.73 M^2
EST. GFR  (NON AFRICAN AMERICAN): 50 ML/MIN/1.73 M^2
GLUCOSE SERPL-MCNC: 106 MG/DL (ref 70–110)
POTASSIUM SERPL-SCNC: 4.4 MMOL/L (ref 3.5–5.1)
SODIUM SERPL-SCNC: 138 MMOL/L (ref 136–145)

## 2020-09-14 PROCEDURE — 80048 BASIC METABOLIC PNL TOTAL CA: CPT

## 2020-09-14 PROCEDURE — 36415 COLL VENOUS BLD VENIPUNCTURE: CPT

## 2020-09-22 ENCOUNTER — PATIENT MESSAGE (OUTPATIENT)
Dept: UROLOGY | Facility: CLINIC | Age: 50
End: 2020-09-22

## 2020-09-22 ENCOUNTER — OFFICE VISIT (OUTPATIENT)
Dept: UROLOGY | Facility: CLINIC | Age: 50
End: 2020-09-22
Payer: COMMERCIAL

## 2020-09-22 VITALS
BODY MASS INDEX: 25.98 KG/M2 | HEART RATE: 64 BPM | HEIGHT: 72 IN | SYSTOLIC BLOOD PRESSURE: 134 MMHG | WEIGHT: 191.81 LBS | DIASTOLIC BLOOD PRESSURE: 92 MMHG

## 2020-09-22 DIAGNOSIS — N18.30 CKD (CHRONIC KIDNEY DISEASE) STAGE 3, GFR 30-59 ML/MIN: ICD-10-CM

## 2020-09-22 DIAGNOSIS — C64.1 RENAL CELL CARCINOMA OF RIGHT KIDNEY: Primary | ICD-10-CM

## 2020-09-22 LAB
BILIRUB SERPL-MCNC: NORMAL MG/DL
BLOOD URINE, POC: NORMAL
CLARITY, POC UA: CLEAR
COLOR, POC UA: YELLOW
GLUCOSE UR QL STRIP: NORMAL
KETONES UR QL STRIP: NORMAL
LEUKOCYTE ESTERASE URINE, POC: NORMAL
NITRITE, POC UA: NORMAL
PH, POC UA: 7
PROTEIN, POC: NORMAL
SPECIFIC GRAVITY, POC UA: 1.02
UROBILINOGEN, POC UA: 0.2

## 2020-09-22 PROCEDURE — 99999 PR PBB SHADOW E&M-EST. PATIENT-LVL III: CPT | Mod: PBBFAC,,, | Performed by: UROLOGY

## 2020-09-22 PROCEDURE — 99999 PR PBB SHADOW E&M-EST. PATIENT-LVL III: ICD-10-PCS | Mod: PBBFAC,,, | Performed by: UROLOGY

## 2020-09-22 PROCEDURE — 81002 URINALYSIS NONAUTO W/O SCOPE: CPT | Mod: S$GLB,,, | Performed by: UROLOGY

## 2020-09-22 PROCEDURE — 81002 POCT URINE DIPSTICK WITHOUT MICROSCOPE: ICD-10-PCS | Mod: S$GLB,,, | Performed by: UROLOGY

## 2020-09-22 PROCEDURE — 99024 PR POST-OP FOLLOW-UP VISIT: ICD-10-PCS | Mod: S$GLB,,, | Performed by: UROLOGY

## 2020-09-22 PROCEDURE — 99024 POSTOP FOLLOW-UP VISIT: CPT | Mod: S$GLB,,, | Performed by: UROLOGY

## 2020-09-22 NOTE — PROGRESS NOTES
[facility] Urology Progress Note    Jan Johnson For postop follow up s/p R robotic radical nephrectomy    He was incidentally found to have approximate 3cm mid to lower pole endophytic central solid enhancing right renal mass concerning for malignancy. After extensive discussion and imaging review, pt elected to proceed with robot-assisted laparascopic partial vs radical nephrectomy.    9/2/20 - right robotic assisted lap radical nephrectomy, after intraop U/S localization of central endophytic renal mass revealed no margin for partial resection  Did well ans DCed home on POD 1 with only mild expected postop renal function decline. Preop Cr normal, and healthy without comorbidities so when postop Cr 1.3/eGFR >60, utilized only 2 doses of toradol Q8 rather than Q6 for narcotic sparing protocol, with Cr up to 1.7 in AM so DCed, and settled at 1.6 at discharge afternoon POD1.  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  Good eating and bowel habits  Good energy, feeling well.      ROS: A comprehensive 10 system review was performed and is negative except as noted above in HPI    PHYSICAL EXAM:    Vitals:    09/22/20 0811   BP: (!) 134/92   Pulse: 64     Body mass index is 26.01 kg/m². Weight: 87 kg (191 lb 12.8 oz) Height: 6' (182.9 cm)       General: Alert, cooperative, no distress, appears stated age   Head: Normocephalic, without obvious abnormality, atraumatic   Eyes: PERRL, conjunctiva/corneas clear   Lungs: Respirations unlabored   Heart: Warm and well perfused   Abdomen: soft NT ND lap incisions well , extraction site incision well healed, no hernia. dermabond still in place on some sites  Extremities: Extremities normal, atraumatic, no cyanosis or edema   Skin: Skin color, texture, turgor normal, no rashes or  lesions   Psych: Appropriate   Neurologic: Non-focal       Recent Results (from the past 336 hour(s))   Basic metabolic panel    Collection Time: 09/14/20  8:28 AM   Result Value Ref Range    Sodium 138 136 - 145 mmol/L    Potassium 4.4 3.5 - 5.1 mmol/L    Chloride 100 95 - 110 mmol/L    CO2 32 (H) 23 - 29 mmol/L    Glucose 106 70 - 110 mg/dL    BUN, Bld 19 6 - 20 mg/dL    Creatinine 1.6 (H) 0.5 - 1.4 mg/dL    Calcium 10.0 8.7 - 10.5 mg/dL    Anion Gap 6 (L) 8 - 16 mmol/L    eGFR if African American 58 (A) >60 mL/min/1.73 m^2    eGFR if non African American 50 (A) >60 mL/min/1.73 m^2   POCT URINE DIPSTICK WITHOUT MICROSCOPE    Collection Time: 09/22/20  8:23 AM   Result Value Ref Range    Color, UA Yellow     Spec Grav UA 1.025     pH, UA 7     WBC, UA neg     Nitrite, UA neg     Protein neg     Glucose, UA neg     Ketones, UA neg     Urobilinogen, UA 0.2     Bilirubin neg     Blood, UA neg     Clarity, UA Clear        ASSESSMENT   1. Renal cell carcinoma of right kidney  POCT URINE DIPSTICK WITHOUT MICROSCOPE   2. Solitary left kidney     3. CKD (chronic kidney disease) stage 3, GFR 30-59 ml/min         Plan    Doing very well s/p uncomplicated R robotic radical nephrectomy    Reviewed favorable pathology. pT1 organ confined renal mass with negative margins.  We discussed chromophobe RCC has less common though better prognosis.  Less likely to be multifocal or bilateral which is a positive in the setting of his solitary remaining kidney.  Discussed small syndromal associations (ie BHD) of which he has no signs or symptoms of, and no family history of renal cell carcinoma, and no further workup is indicated at this time.  We discussed routine surveillance of T1a renal masses includes baseline reimaging at 6-12 months, and annual imaging and labs for 3 years.  Most often this is at least a baseline CT of the abdomen with contrast, and then a CT every year for 3 years with chest x-ray and BMP, but given low likelihood  of recurrence, as also acceptable to perform annual renal ultrasound after initial CT.    RTC 1 year, and will plan imaging prior.  Advised to call when he gets reminder letter to generate imaging, as he does have some mild stable postop CKD 3 with stable GFR 50, Cr 1.6.  Again reviewed as he is young and healthy without risk factors, this may stabilize and normalize with more time away from surgery.  His urinalysis is bland, no proteinuria.  He establishes care with Nephrology on 10/29/20 with Dr. Leonardo.  We discussed no urgency to this, but should have routine nephrologic follow-up as per Dr. Leonardo recommendations    Reviewed postop recommendations of no lifting pushing pulling greater than 10 lb x4 weeks.  Slowly increased activity level.  Can remove Dermabond.  Discussed techniques such as scar massage for extraction site incision.  Answered all questions.  Patient agreeable treatment plan.    RTC 1 year.  Call when reminder letter received plan imaging based on renal function at that time

## 2020-09-29 ENCOUNTER — PATIENT MESSAGE (OUTPATIENT)
Dept: UROLOGY | Facility: CLINIC | Age: 50
End: 2020-09-29

## 2020-10-06 ENCOUNTER — PATIENT MESSAGE (OUTPATIENT)
Dept: UROLOGY | Facility: CLINIC | Age: 50
End: 2020-10-06

## 2020-10-06 ENCOUNTER — TELEPHONE (OUTPATIENT)
Dept: UROLOGY | Facility: CLINIC | Age: 50
End: 2020-10-06

## 2020-10-06 NOTE — TELEPHONE ENCOUNTER
----- Message from Cortney Godinez sent at 10/6/2020  3:16 PM CDT -----  Contact: self  Patient has a cancer claim form from his insurance company, and wants to know what is the best way for him to get it to you.  He can scan it and put it on my chart if that works for you.  Call back at [phone] (home) to advise and thanks

## 2020-10-06 NOTE — TELEPHONE ENCOUNTER
Called and spoke to patient re: cancer claim from insurance company. Informed patient that he can send documents to [id number] or fax to office at [phone]. Patient voiced okay.

## 2020-10-07 NOTE — TELEPHONE ENCOUNTER
Called and spoke to patient to confirm that completed paperwork has been received at email: [email or web address]. Patient confirmed. Documents sent to been scanned into patient chart.

## 2020-10-14 ENCOUNTER — PATIENT MESSAGE (OUTPATIENT)
Dept: UROLOGY | Facility: CLINIC | Age: 50
End: 2020-10-14

## 2020-10-21 ENCOUNTER — PATIENT MESSAGE (OUTPATIENT)
Dept: NEPHROLOGY | Facility: CLINIC | Age: 50
End: 2020-10-21

## 2020-10-22 ENCOUNTER — PATIENT OUTREACH (OUTPATIENT)
Dept: ADMINISTRATIVE | Facility: OTHER | Age: 50
End: 2020-10-22

## 2020-10-22 ENCOUNTER — OFFICE VISIT (OUTPATIENT)
Dept: NEPHROLOGY | Facility: CLINIC | Age: 50
End: 2020-10-22
Payer: COMMERCIAL

## 2020-10-22 VITALS
BODY MASS INDEX: 26.12 KG/M2 | DIASTOLIC BLOOD PRESSURE: 90 MMHG | HEART RATE: 78 BPM | SYSTOLIC BLOOD PRESSURE: 120 MMHG | OXYGEN SATURATION: 99 % | HEIGHT: 72 IN | WEIGHT: 192.88 LBS

## 2020-10-22 DIAGNOSIS — N18.30 STAGE 3 CHRONIC KIDNEY DISEASE, UNSPECIFIED WHETHER STAGE 3A OR 3B CKD: Primary | ICD-10-CM

## 2020-10-22 DIAGNOSIS — Z90.5 HISTORY OF NEPHRECTOMY, UNILATERAL: ICD-10-CM

## 2020-10-22 PROCEDURE — 99244 PR OFFICE CONSULTATION,LEVEL IV: ICD-10-PCS | Mod: S$GLB,,, | Performed by: INTERNAL MEDICINE

## 2020-10-22 PROCEDURE — 99999 PR PBB SHADOW E&M-EST. PATIENT-LVL III: ICD-10-PCS | Mod: PBBFAC,,, | Performed by: INTERNAL MEDICINE

## 2020-10-22 PROCEDURE — 99999 PR PBB SHADOW E&M-EST. PATIENT-LVL III: CPT | Mod: PBBFAC,,, | Performed by: INTERNAL MEDICINE

## 2020-10-22 PROCEDURE — 99244 OFF/OP CNSLTJ NEW/EST MOD 40: CPT | Mod: S$GLB,,, | Performed by: INTERNAL MEDICINE

## 2020-10-22 NOTE — PROGRESS NOTES
Subjective:       Patient ID: Valentin Johnson is a 49 y.o. White male who presents for new patient evaluation for chronic renal failure.    Valentin Johnson is referred by ALLISON Castañeda MD to be evaluated for chronic renal failure.  He is s/p nephrectomy for a renal mass on September 2nd.  He has no uremic or urinary symptoms and is in his usual state of health.  He denies NSAIDs.  He sees Dr. Gomez for his Urology care.      Review of Systems   Constitutional: Negative for appetite change, chills and fever.   HENT: Negative for congestion.    Eyes: Negative for visual disturbance.   Respiratory: Negative for cough and shortness of breath.    Cardiovascular: Negative for chest pain and leg swelling.   Gastrointestinal: Negative for abdominal pain, diarrhea, nausea and vomiting.   Genitourinary: Negative for difficulty urinating, dysuria and hematuria.   Musculoskeletal: Negative for myalgias.   Skin: Negative for rash.   Neurological: Negative for headaches.   Psychiatric/Behavioral: Negative for sleep disturbance.       The past medical, family and social histories were reviewed for this encounter.     Past Medical History:   Diagnosis Date    Colon polyp     GERD (gastroesophageal reflux disease)     Giardial enteritis     H. pylori infection 2015    Prostatitis      Past Surgical History:   Procedure Laterality Date    CIRCUMCISION      COLONOSCOPY N/A 12/16/2015    Procedure: COLONOSCOPY;  Surgeon: Anton Matos MD;  Location: [facility];  Service: Endoscopy;  Laterality: N/A;    COLONOSCOPY N/A 8/14/2020    Procedure: COLONOSCOPY;  Surgeon: Christian Velasquez MD;  Location: [facility];  Service: Endoscopy;  Laterality: N/A;    HERNIA REPAIR Right     inguinal    NASAL SEPTUM SURGERY      deviated septum, chronic sinusitis    ROBOT-ASSISTED LAPAROSCOPIC NEPHRECTOMY Right 9/2/2020    Procedure: ROBOTIC RADICAL NEPHRECTOMY;  Surgeon: Corby Gomez MD;  Location: [facility];  Service:  Urology;  Laterality: Right;  possible partial - DOES need ultrasound - deep mass - book with Agillionel/S    UPPER GASTROINTESTINAL ENDOSCOPY       Social History     Socioeconomic History    Marital status:      Spouse name: Not on file    Number of children: Not on file    Years of education: Not on file    Highest education level: Not on file   Occupational History    Occupation: sports medicine    Occupation:      Employer: [employer]     Comment: [employer]   Social Needs    Financial resource strain: Somewhat hard    Food insecurity     Worry: Never true     Inability: Never true    Transportation needs     Medical: No     Non-medical: No   Tobacco Use    Smoking status: Never Smoker    Smokeless tobacco: Never Used   Substance and Sexual Activity    Alcohol use: Yes     Frequency: 2-4 times a month     Drinks per session: 1 or 2     Binge frequency: Less than monthly     Comment: seldom    Drug use: No    Sexual activity: Yes     Partners: Female   Lifestyle    Physical activity     Days per week: 4 days     Minutes per session: 50 min    Stress: Only a little   Relationships    Social connections     Talks on phone: More than three times a week     Gets together: Once a week     Attends [employer]oration[employer] service: Not on file     Active member of club or organization: Yes     Attends meetings of clubs or organizations: More than 4 times per year     Relationship status:    Other Topics Concern    Not on file   Social History Narrative    Not on file     Current Outpatient Medications   Medication Sig    acetaminophen (TYLENOL) 325 MG tablet Take 2 tablets (650 mg total) by mouth every 6 (six) hours as needed. (Patient not taking: Reported on 9/22/2020)    ascorbic acid, vitamin C, (VITAMIN C) 100 MG tablet Take 100 mg by mouth once daily.    docusate sodium (COLACE) 100 MG capsule Take 1 capsule (100 mg total) by mouth 2 (two) times daily.  (Patient not taking: Reported on 9/22/2020)    multivitamin (THERAGRAN) per tablet Take 1 tablet by mouth once daily.    polyethylene glycol (GLYCOLAX) 17 gram PwPk Take 17 g by mouth once daily. (Patient not taking: Reported on 9/22/2020)    simethicone (MYLICON) 80 MG chewable tablet Take 1 tablet (80 mg total) by mouth 3 (three) times daily as needed (gas pain bloating). (Patient not taking: Reported on 9/22/2020)    traMADoL (ULTRAM) 50 mg tablet Take 1 tablet (50 mg total) by mouth every 6 (six) hours as needed (pain not relieved by otc agents). (Patient not taking: Reported on 9/22/2020)    vitamin D (VITAMIN D3) 1000 units Tab Take 1,000 Units by mouth once daily.    vitamin E 100 UNIT capsule Take 100 Units by mouth once daily.     No current facility-administered medications for this visit.        BP (!) 120/90 (BP Location: Right arm, Patient Position: Sitting)   Pulse 78   Ht 6' (1.829 m)   Wt 87.5 kg (192 lb 14.4 oz)   SpO2 99%   BMI 26.16 kg/m²     Objective:      Physical Exam  Vitals signs reviewed.   Constitutional:       General: He is not in acute distress.     Appearance: He is well-developed.   HENT:      Head: Normocephalic and atraumatic.   Eyes:      General: No scleral icterus.     Conjunctiva/sclera: Conjunctivae normal.   Neck:      Musculoskeletal: Normal range of motion.      Vascular: No JVD.   Cardiovascular:      Rate and Rhythm: Normal rate and regular rhythm.      Heart sounds: Normal heart sounds. No murmur. No friction rub. No gallop.    Pulmonary:      Effort: Pulmonary effort is normal. No respiratory distress.      Breath sounds: Normal breath sounds. No wheezing or rales.   Abdominal:      General: Bowel sounds are normal. There is no distension.      Palpations: Abdomen is soft.      Tenderness: There is no abdominal tenderness.   Musculoskeletal:      Right lower leg: No edema.      Left lower leg: No edema.   Skin:     General: Skin is warm and dry.      Findings:  No rash.   Neurological:      Mental Status: He is alert and oriented to person, place, and time.   Psychiatric:         Mood and Affect: Mood normal.         Behavior: Behavior normal.         Assessment:       1. Stage 3 chronic kidney disease, unspecified whether stage 3a or 3b CKD    2. History of nephrectomy, unilateral        Plan:   Return to clinic in 6 months.  Labs for next visit include rp, pth  ua.  Baseline creatinine is 1.0-1.2 prior to his nx.  Renal US showed L 11.8 cm.  We discussed ways he can help protect his solitary kidney.

## 2020-10-22 NOTE — LETTER
October 22, 2020      Corby Gomez MD  [address] [place] [address]   [address]  [facility] [address]           [facility] [address] [place] [address]  Phone: [phone]          Patient: Valentin Johnson   MR Number: [id number]   YOB: 1970   Date of Visit: 10/22/2020       Dear Dr. Corby Gomez:    Thank you for referring Valentin Johnson to me for evaluation. Attached you will find relevant portions of my assessment and plan of care.    If you have questions, please do not hesitate to call me. I look forward to following Valentin Johnson along with you.    Sincerely,    Patel Leonardo MD    Enclosure  CC:  No Recipients    If you would like to receive this communication electronically, please contact [email or web address] or [phone] to request more information on [employer] Link access.    For providers and/or their staff who would like to refer a patient to [facility], please contact us through our one-stop-shop provider referral line, Esther Crouch, at [phone].    If you feel you have received this communication in error or would no longer like to receive these types of communications, please e-mail [email or web address]

## 2020-10-22 NOTE — PROGRESS NOTES
Health Maintenance Due   Topic Date Due    Influenza Vaccine (1) 08/01/2020     Updates were requested from care everywhere.  Chart was reviewed for overdue Proactive [facility] Encounters ([facility]ARA) topics (CRS, Breast Cancer Screening, Eye exam)  Health Maintenance has been updated.  LINKS immunization registry triggered.  Immunizations were reconciled.

## 2020-11-16 ENCOUNTER — PATIENT MESSAGE (OUTPATIENT)
Dept: SURGERY | Facility: CLINIC | Age: 50
End: 2020-11-16

## 2020-11-16 ENCOUNTER — PATIENT MESSAGE (OUTPATIENT)
Dept: NEPHROLOGY | Facility: CLINIC | Age: 50
End: 2020-11-16

## 2020-11-17 NOTE — TELEPHONE ENCOUNTER
Called the pt and he verbally understood that if he persist to have symptoms to contact Dr Castañeda.

## 2020-11-17 NOTE — TELEPHONE ENCOUNTER
Let him know if his symptoms persist please get him in to see ALLISON Castañeda MD for further evaluation.

## 2020-11-19 ENCOUNTER — PATIENT MESSAGE (OUTPATIENT)
Dept: UROLOGY | Facility: CLINIC | Age: 50
End: 2020-11-19

## 2020-12-08 ENCOUNTER — PATIENT OUTREACH (OUTPATIENT)
Dept: ADMINISTRATIVE | Facility: OTHER | Age: 50
End: 2020-12-08

## 2020-12-08 NOTE — PROGRESS NOTES
Health Maintenance Due   Topic Date Due    Influenza Vaccine (1) 08/01/2020    Shingles Vaccine (1 of 2) 11/17/2020     Updates were requested from care everywhere.  Chart was reviewed for overdue Proactive [facility] Encounters ([facility]ARA) topics (CRS, Breast Cancer Screening, Eye exam)  Health Maintenance has been updated.  LINKS immunization registry triggered.  LINKS not responding.

## 2020-12-09 ENCOUNTER — PATIENT MESSAGE (OUTPATIENT)
Dept: UROLOGY | Facility: CLINIC | Age: 50
End: 2020-12-09

## 2020-12-09 ENCOUNTER — OFFICE VISIT (OUTPATIENT)
Dept: SURGERY | Facility: CLINIC | Age: 50
End: 2020-12-09
Payer: COMMERCIAL

## 2020-12-09 VITALS
BODY MASS INDEX: 26.37 KG/M2 | WEIGHT: 194.69 LBS | TEMPERATURE: 98 F | HEIGHT: 72 IN | DIASTOLIC BLOOD PRESSURE: 79 MMHG | SYSTOLIC BLOOD PRESSURE: 142 MMHG | HEART RATE: 66 BPM

## 2020-12-09 DIAGNOSIS — K61.2 ANORECTAL ABSCESS: ICD-10-CM

## 2020-12-09 PROCEDURE — 99244 PR OFFICE CONSULTATION,LEVEL IV: ICD-10-PCS | Mod: 25,S$GLB,, | Performed by: SURGERY

## 2020-12-09 PROCEDURE — 99999 PR PBB SHADOW E&M-EST. PATIENT-LVL IV: ICD-10-PCS | Mod: PBBFAC,,, | Performed by: SURGERY

## 2020-12-09 PROCEDURE — 1126F AMNT PAIN NOTED NONE PRSNT: CPT | Mod: S$GLB,,, | Performed by: SURGERY

## 2020-12-09 PROCEDURE — 3008F PR BODY MASS INDEX (BMI) DOCUMENTED: ICD-10-PCS | Mod: CPTII,S$GLB,, | Performed by: SURGERY

## 2020-12-09 PROCEDURE — 46600 PR DIAG2STIC A2SCOPY: ICD-10-PCS | Mod: S$GLB,,, | Performed by: SURGERY

## 2020-12-09 PROCEDURE — 3008F BODY MASS INDEX DOCD: CPT | Mod: CPTII,S$GLB,, | Performed by: SURGERY

## 2020-12-09 PROCEDURE — 46600 DIAGNOSTIC ANOSCOPY SPX: CPT | Mod: S$GLB,,, | Performed by: SURGERY

## 2020-12-09 PROCEDURE — 99999 PR PBB SHADOW E&M-EST. PATIENT-LVL IV: CPT | Mod: PBBFAC,,, | Performed by: SURGERY

## 2020-12-09 PROCEDURE — 99244 OFF/OP CNSLTJ NEW/EST MOD 40: CPT | Mod: 25,S$GLB,, | Performed by: SURGERY

## 2020-12-09 PROCEDURE — 1126F PR PAIN SEVERITY QUANTIFIED, NO PAIN PRESENT: ICD-10-PCS | Mod: S$GLB,,, | Performed by: SURGERY

## 2020-12-09 NOTE — Clinical Note
I saw your patient, Valentin Johnson in the office.  Attached are my findings and plan.  Thank you for referring him to my office and if you have any questions please do not hesitate to call my cell [phone].    Adam Lombardi

## 2020-12-09 NOTE — PROGRESS NOTES
Subjective:       Patient ID: Valentin Johnson is a 50 y.o. male.    Chief Complaint: Follow-up (F/U Colon)    HPI  this is a pleasant 50-year-old gentleman who was referred to me in consultation from Dr. Velasquez for evaluation of abnormal colonoscopy.  Patient notes he had colonoscopy performed secondary to bleeding.  He also never had a colonoscopy.  Colonoscopy was performed in August and noted to have a nodular area with slight purulent drainage on palpation.  Patient notes he has been doing well otherwise.  He does have persistent occasional bleeding.  Patient later had a CT scan which demonstrated a mass on his right kidney.  This led to a nephrectomy with diagnosis of renal cell carcinoma.  Patient is now presenting to my office for evaluation of anal lesion.  As mentioned previously does have some occasional bleeding.  He denies any pain or discomfort.  No fevers or chills.  No changes in bowel habits.  Review of Systems   Constitutional: Negative for activity change, appetite change, diaphoresis, fever and unexpected weight change.   HENT: Negative for dental problem and goiter.    Respiratory: Negative for cough, chest tightness and wheezing.    Cardiovascular: Negative for chest pain and palpitations.   Gastrointestinal: Positive for abdominal pain and anal bleeding. Negative for abdominal distention, blood in stool, constipation, diarrhea, nausea, rectal pain and vomiting.   Genitourinary: Negative for difficulty urinating, dysuria and hematuria.   Musculoskeletal: Negative for back pain and gait problem.   Neurological: Negative for tremors and seizures.   Hematological: Negative for adenopathy. Does not bruise/bleed easily.         Objective:      Physical Exam  Vitals signs reviewed.   Constitutional:       Appearance: He is well-developed.   HENT:      Head: Normocephalic and atraumatic.   Eyes:      Pupils: Pupils are equal, round, and reactive to light.   Neck:      Musculoskeletal: Normal range  of motion.      Thyroid: No thyromegaly.      Trachea: No tracheal deviation.   Cardiovascular:      Rate and Rhythm: Normal rate and regular rhythm.      Heart sounds: Normal heart sounds. No murmur. No friction rub. No gallop.    Pulmonary:      Effort: Pulmonary effort is normal.      Breath sounds: Normal breath sounds. No wheezing.   Chest:      Chest wall: No tenderness.   Abdominal:      General: There is no distension.      Palpations: There is no mass.      Tenderness: There is no abdominal tenderness. There is no guarding or rebound.   Genitourinary:     Comments: External exam demonstrates no significant abnormality.  Digital rectal exam demonstrates nodularity of the anterior rectal wall.  Anoscopy performed demonstrating internal hemorrhoids associated with the area of nodularity.  This is anterior to the right of midline.  No other masses or obvious abnormality noted  Musculoskeletal: Normal range of motion.   Skin:     General: Skin is warm.   Neurological:      Mental Status: He is alert and oriented to person, place, and time.      Coordination: Coordination normal.                     Assessment:       1. Anorectal abscess        Plan:       D/w pt  uncertain as to dissect etiology of this lesion.  No obvious external opening to explain a possible fistula.  Will proceed with an MRI to evaluate.  Depending on the findings of the MRI may consider surgical excision.  Patient expresses understanding.  Will follow up after the MRI

## 2020-12-10 ENCOUNTER — HOSPITAL ENCOUNTER (OUTPATIENT)
Dept: RADIOLOGY | Facility: HOSPITAL | Age: 50
Discharge: HOME | End: 2020-12-10
Attending: SURGERY
Payer: COMMERCIAL

## 2020-12-10 DIAGNOSIS — K61.2 ANORECTAL ABSCESS: ICD-10-CM

## 2020-12-10 PROCEDURE — 25500020 PHARM REV CODE 255: Performed by: SURGERY

## 2020-12-10 PROCEDURE — A9585 GADOBUTROL INJECTION: HCPCS | Performed by: SURGERY

## 2020-12-10 PROCEDURE — 72197 MRI PELVIS W/O & W/DYE: CPT | Mod: TC

## 2020-12-10 PROCEDURE — 72197 MRI PELVIS W/O & W/DYE: CPT | Mod: 26,,, | Performed by: RADIOLOGY

## 2020-12-10 PROCEDURE — 72197 MRI PELVIS W WO CONTRAST: ICD-10-PCS | Mod: 26,,, | Performed by: RADIOLOGY

## 2020-12-10 RX ORDER — GADOBUTROL 604.72 MG/ML
8 INJECTION INTRAVENOUS
Status: COMPLETED | OUTPATIENT
Start: 2020-12-10 | End: 2020-12-10

## 2020-12-10 RX ADMIN — GADOBUTROL 8 ML: 604.72 INJECTION INTRAVENOUS at 11:12

## 2020-12-31 ENCOUNTER — TELEPHONE (OUTPATIENT)
Dept: SURGERY | Facility: CLINIC | Age: 50
End: 2020-12-31

## 2020-12-31 NOTE — TELEPHONE ENCOUNTER
Called and reviewed results of MRI with pt.  Nodular lesion noted, corresponds to palpable area    Recommend excision in OR.    Pt will D/w family and call to arrange

## 2021-01-04 ENCOUNTER — PATIENT MESSAGE (OUTPATIENT)
Dept: SURGERY | Facility: CLINIC | Age: 51
End: 2021-01-04

## 2021-01-07 DIAGNOSIS — Z01.818 PREOP EXAMINATION: ICD-10-CM

## 2021-01-07 DIAGNOSIS — K60.3 FISTULA-IN-ANO: Primary | ICD-10-CM

## 2021-01-07 DIAGNOSIS — K62.9 ANAL LESION: Primary | ICD-10-CM

## 2021-01-07 RX ORDER — SODIUM CHLORIDE 9 MG/ML
INJECTION, SOLUTION INTRAVENOUS CONTINUOUS
Status: CANCELLED | OUTPATIENT
Start: 2021-01-07

## 2021-01-07 RX ORDER — METRONIDAZOLE 500 MG/100ML
500 INJECTION, SOLUTION INTRAVENOUS
Status: CANCELLED | OUTPATIENT
Start: 2021-01-07

## 2021-01-14 ENCOUNTER — OFFICE VISIT (OUTPATIENT)
Dept: OPTOMETRY | Facility: CLINIC | Age: 51
End: 2021-01-14
Payer: COMMERCIAL

## 2021-01-14 DIAGNOSIS — Z01.00 EXAMINATION OF EYES AND VISION: Primary | ICD-10-CM

## 2021-01-14 DIAGNOSIS — H52.7 REFRACTIVE ERROR: ICD-10-CM

## 2021-01-14 DIAGNOSIS — Z46.0 CONTACT LENS/GLASSES FITTING: Primary | ICD-10-CM

## 2021-01-14 PROCEDURE — 92015 PR REFRACTION: ICD-10-PCS | Mod: S$GLB,,, | Performed by: OPTOMETRIST

## 2021-01-14 PROCEDURE — 99999 PR PBB SHADOW E&M-EST. PATIENT-LVL III: ICD-10-PCS | Mod: PBBFAC,,, | Performed by: OPTOMETRIST

## 2021-01-14 PROCEDURE — 99999 PR PBB SHADOW E&M-EST. PATIENT-LVL III: CPT | Mod: PBBFAC,,, | Performed by: OPTOMETRIST

## 2021-01-14 PROCEDURE — 92014 PR EYE EXAM, EST PATIENT,COMPREHESV: ICD-10-PCS | Mod: S$GLB,,, | Performed by: OPTOMETRIST

## 2021-01-14 PROCEDURE — 92014 COMPRE OPH EXAM EST PT 1/>: CPT | Mod: S$GLB,,, | Performed by: OPTOMETRIST

## 2021-01-14 PROCEDURE — 92310 PR CONTACT LENS FITTING (NO CHANGE): ICD-10-PCS | Mod: CSM,S$GLB,, | Performed by: OPTOMETRIST

## 2021-01-14 PROCEDURE — 92310 CONTACT LENS FITTING OU: CPT | Mod: CSM,S$GLB,, | Performed by: OPTOMETRIST

## 2021-01-14 PROCEDURE — 99499 UNLISTED E&M SERVICE: CPT | Mod: S$GLB,,, | Performed by: OPTOMETRIST

## 2021-01-14 PROCEDURE — 99999 PR PBB SHADOW E&M-EST. PATIENT-LVL II: ICD-10-PCS | Mod: PBBFAC,,, | Performed by: OPTOMETRIST

## 2021-01-14 PROCEDURE — 99999 PR PBB SHADOW E&M-EST. PATIENT-LVL II: CPT | Mod: PBBFAC,,, | Performed by: OPTOMETRIST

## 2021-01-14 PROCEDURE — 92015 DETERMINE REFRACTIVE STATE: CPT | Mod: S$GLB,,, | Performed by: OPTOMETRIST

## 2021-01-14 PROCEDURE — 99499 NO LOS: ICD-10-PCS | Mod: S$GLB,,, | Performed by: OPTOMETRIST

## 2021-01-15 ENCOUNTER — LAB VISIT (OUTPATIENT)
Dept: LAB | Facility: HOSPITAL | Age: 51
End: 2021-01-15
Attending: SURGERY
Payer: COMMERCIAL

## 2021-01-15 ENCOUNTER — HOSPITAL ENCOUNTER (OUTPATIENT)
Dept: RADIOLOGY | Facility: HOSPITAL | Age: 51
Discharge: HOME | End: 2021-01-15
Attending: SURGERY
Payer: COMMERCIAL

## 2021-01-15 ENCOUNTER — HOSPITAL ENCOUNTER (OUTPATIENT)
Dept: PREADMISSION TESTING | Facility: HOSPITAL | Age: 51
Discharge: HOME | End: 2021-01-15
Attending: SURGERY
Payer: COMMERCIAL

## 2021-01-15 VITALS
RESPIRATION RATE: 18 BRPM | SYSTOLIC BLOOD PRESSURE: 130 MMHG | DIASTOLIC BLOOD PRESSURE: 83 MMHG | BODY MASS INDEX: 26.3 KG/M2 | HEART RATE: 67 BPM | OXYGEN SATURATION: 100 % | WEIGHT: 198.44 LBS | TEMPERATURE: 99 F | HEIGHT: 73 IN

## 2021-01-15 DIAGNOSIS — K60.3 FISTULA-IN-ANO: ICD-10-CM

## 2021-01-15 DIAGNOSIS — Z01.818 PREOP EXAMINATION: ICD-10-CM

## 2021-01-15 DIAGNOSIS — Z01.818 PREOP TESTING: Primary | ICD-10-CM

## 2021-01-15 LAB
ANION GAP SERPL CALC-SCNC: 11 MMOL/L (ref 8–16)
BASOPHILS # BLD AUTO: 0.03 K/UL (ref 0–0.2)
BASOPHILS NFR BLD: 0.6 % (ref 0–1.9)
BILIRUB UR QL STRIP: NEGATIVE
BUN SERPL-MCNC: 20 MG/DL (ref 6–20)
CALCIUM SERPL-MCNC: 9.7 MG/DL (ref 8.7–10.5)
CHLORIDE SERPL-SCNC: 100 MMOL/L (ref 95–110)
CLARITY UR: CLEAR
CO2 SERPL-SCNC: 29 MMOL/L (ref 23–29)
COLOR UR: YELLOW
CREAT SERPL-MCNC: 1.4 MG/DL (ref 0.5–1.4)
DIFFERENTIAL METHOD: NORMAL
EOSINOPHIL # BLD AUTO: 0.1 K/UL (ref 0–0.5)
EOSINOPHIL NFR BLD: 1.3 % (ref 0–8)
ERYTHROCYTE [DISTWIDTH] IN BLOOD BY AUTOMATED COUNT: 13.3 % (ref 11.5–14.5)
EST. GFR  (AFRICAN AMERICAN): >60 ML/MIN/1.73 M^2
EST. GFR  (NON AFRICAN AMERICAN): 58.2 ML/MIN/1.73 M^2
GLUCOSE SERPL-MCNC: 102 MG/DL (ref 70–110)
GLUCOSE UR QL STRIP: NEGATIVE
HCT VFR BLD AUTO: 42.7 % (ref 40–54)
HGB BLD-MCNC: 14.1 G/DL (ref 14–18)
HGB UR QL STRIP: NEGATIVE
IMM GRANULOCYTES # BLD AUTO: 0.01 K/UL (ref 0–0.04)
IMM GRANULOCYTES NFR BLD AUTO: 0.2 % (ref 0–0.5)
KETONES UR QL STRIP: NEGATIVE
LEUKOCYTE ESTERASE UR QL STRIP: NEGATIVE
LYMPHOCYTES # BLD AUTO: 1.4 K/UL (ref 1–4.8)
LYMPHOCYTES NFR BLD: 28.8 % (ref 18–48)
MCH RBC QN AUTO: 30.1 PG (ref 27–31)
MCHC RBC AUTO-ENTMCNC: 33 G/DL (ref 32–36)
MCV RBC AUTO: 91 FL (ref 82–98)
MONOCYTES # BLD AUTO: 0.4 K/UL (ref 0.3–1)
MONOCYTES NFR BLD: 8.6 % (ref 4–15)
NEUTROPHILS # BLD AUTO: 2.9 K/UL (ref 1.8–7.7)
NEUTROPHILS NFR BLD: 60.5 % (ref 38–73)
NITRITE UR QL STRIP: NEGATIVE
NRBC BLD-RTO: 0 /100 WBC
PH UR STRIP: 6 [PH] (ref 5–8)
PLATELET # BLD AUTO: 313 K/UL (ref 150–350)
PMV BLD AUTO: 9.7 FL (ref 9.2–12.9)
POTASSIUM SERPL-SCNC: 4.1 MMOL/L (ref 3.5–5.1)
PROT UR QL STRIP: NEGATIVE
RBC # BLD AUTO: 4.69 M/UL (ref 4.6–6.2)
SODIUM SERPL-SCNC: 140 MMOL/L (ref 136–145)
SP GR UR STRIP: 1.01 (ref 1–1.03)
URN SPEC COLLECT METH UR: NORMAL
UROBILINOGEN UR STRIP-ACNC: NEGATIVE EU/DL
WBC # BLD AUTO: 4.79 K/UL (ref 3.9–12.7)

## 2021-01-15 PROCEDURE — U0003 INFECTIOUS AGENT DETECTION BY NUCLEIC ACID (DNA OR RNA); SEVERE ACUTE RESPIRATORY SYNDROME CORONAVIRUS 2 (SARS-COV-2) (CORONAVIRUS DISEASE [COVID-19]), AMPLIFIED PROBE TECHNIQUE, MAKING USE OF HIGH THROUGHPUT TECHNOLOGIES AS DESCRIBED BY CMS-2020-01-R: HCPCS

## 2021-01-15 PROCEDURE — 81003 URINALYSIS AUTO W/O SCOPE: CPT

## 2021-01-15 PROCEDURE — 36415 COLL VENOUS BLD VENIPUNCTURE: CPT

## 2021-01-15 PROCEDURE — 85025 COMPLETE CBC W/AUTO DIFF WBC: CPT

## 2021-01-15 PROCEDURE — 93010 EKG 12-LEAD: ICD-10-PCS | Mod: ,,, | Performed by: SPECIALIST

## 2021-01-15 PROCEDURE — 93005 ELECTROCARDIOGRAM TRACING: CPT | Performed by: SPECIALIST

## 2021-01-15 PROCEDURE — 80048 BASIC METABOLIC PNL TOTAL CA: CPT

## 2021-01-15 PROCEDURE — 93010 ELECTROCARDIOGRAM REPORT: CPT | Mod: ,,, | Performed by: SPECIALIST

## 2021-01-15 PROCEDURE — 71046 X-RAY EXAM CHEST 2 VIEWS: CPT | Mod: TC

## 2021-01-16 LAB — SARS-COV-2 RNA RESP QL NAA+PROBE: NOT DETECTED

## 2021-01-18 ENCOUNTER — ANESTHESIA (OUTPATIENT)
Dept: SURGERY | Facility: HOSPITAL | Age: 51
End: 2021-01-18
Payer: COMMERCIAL

## 2021-01-18 ENCOUNTER — HOSPITAL ENCOUNTER (OUTPATIENT)
Facility: HOSPITAL | Age: 51
Discharge: HOME | End: 2021-01-18
Attending: SURGERY | Admitting: SURGERY
Payer: COMMERCIAL

## 2021-01-18 ENCOUNTER — ANESTHESIA EVENT (OUTPATIENT)
Dept: SURGERY | Facility: HOSPITAL | Age: 51
End: 2021-01-18
Payer: COMMERCIAL

## 2021-01-18 VITALS
RESPIRATION RATE: 16 BRPM | OXYGEN SATURATION: 98 % | SYSTOLIC BLOOD PRESSURE: 118 MMHG | HEART RATE: 62 BPM | TEMPERATURE: 98 F | DIASTOLIC BLOOD PRESSURE: 71 MMHG

## 2021-01-18 DIAGNOSIS — K60.3 FISTULA-IN-ANO: ICD-10-CM

## 2021-01-18 DIAGNOSIS — K62.9 ANAL LESION: Primary | ICD-10-CM

## 2021-01-18 PROBLEM — K60.30 FISTULA-IN-ANO: Status: ACTIVE | Noted: 2021-01-18

## 2021-01-18 PROCEDURE — 27201480 HC GLIDESCOPE: Performed by: ANESTHESIOLOGY

## 2021-01-18 PROCEDURE — 27201107 HC STYLET, STANDARD: Performed by: ANESTHESIOLOGY

## 2021-01-18 PROCEDURE — 25000003 PHARM REV CODE 250

## 2021-01-18 PROCEDURE — 37000009 HC ANESTHESIA EA ADD 15 MINS: Performed by: SURGERY

## 2021-01-18 PROCEDURE — 27000663 HC PAD, ARM CRADLE PRONE: Performed by: ANESTHESIOLOGY

## 2021-01-18 PROCEDURE — 36000706: Performed by: SURGERY

## 2021-01-18 PROCEDURE — 46610: ICD-10-PCS | Mod: ,,, | Performed by: SURGERY

## 2021-01-18 PROCEDURE — 27000671 HC TUBING MICROBORE EXT: Performed by: ANESTHESIOLOGY

## 2021-01-18 PROCEDURE — 63600175 PHARM REV CODE 636 W HCPCS: Performed by: NURSE ANESTHETIST, CERTIFIED REGISTERED

## 2021-01-18 PROCEDURE — 71000033 HC RECOVERY, INTIAL HOUR: Performed by: SURGERY

## 2021-01-18 PROCEDURE — 27000657 HC HEADREST, PRONE: Performed by: ANESTHESIOLOGY

## 2021-01-18 PROCEDURE — 27000656 HC EYE GOGGLES: Performed by: ANESTHESIOLOGY

## 2021-01-18 PROCEDURE — 71000039 HC RECOVERY, EACH ADD'L HOUR: Performed by: SURGERY

## 2021-01-18 PROCEDURE — 27202107 HC XP QUATRO SENSOR: Performed by: ANESTHESIOLOGY

## 2021-01-18 PROCEDURE — 25000003 PHARM REV CODE 250: Performed by: SURGERY

## 2021-01-18 PROCEDURE — 71000015 HC POSTOP RECOV 1ST HR: Performed by: SURGERY

## 2021-01-18 PROCEDURE — 25000003 PHARM REV CODE 250: Performed by: NURSE ANESTHETIST, CERTIFIED REGISTERED

## 2021-01-18 PROCEDURE — 63600175 PHARM REV CODE 636 W HCPCS: Performed by: SURGERY

## 2021-01-18 PROCEDURE — 27000284 HC CANNULA NASAL: Performed by: ANESTHESIOLOGY

## 2021-01-18 PROCEDURE — 63600175 PHARM REV CODE 636 W HCPCS: Performed by: ANESTHESIOLOGY

## 2021-01-18 PROCEDURE — 27201423 OPTIME MED/SURG SUP & DEVICES STERILE SUPPLY: Performed by: SURGERY

## 2021-01-18 PROCEDURE — 37000008 HC ANESTHESIA 1ST 15 MINUTES: Performed by: SURGERY

## 2021-01-18 PROCEDURE — S0030 INJECTION, METRONIDAZOLE: HCPCS | Performed by: SURGERY

## 2021-01-18 PROCEDURE — 46610 ANOSCOPY REMOVE LESION: CPT | Mod: ,,, | Performed by: SURGERY

## 2021-01-18 PROCEDURE — 27000673 HC TUBING BLOOD Y: Performed by: ANESTHESIOLOGY

## 2021-01-18 PROCEDURE — 71000016 HC POSTOP RECOV ADDL HR: Performed by: SURGERY

## 2021-01-18 PROCEDURE — 36000707: Performed by: SURGERY

## 2021-01-18 RX ORDER — HYDROCODONE BITARTRATE AND ACETAMINOPHEN 5; 325 MG/1; MG/1
1 TABLET ORAL EVERY 6 HOURS PRN
Qty: 20 TABLET | Refills: 0 | Status: SHIPPED | OUTPATIENT
Start: 2021-01-18 | End: 2021-04-22

## 2021-01-18 RX ORDER — OXYCODONE HYDROCHLORIDE 5 MG/1
5 TABLET ORAL
Status: DISCONTINUED | OUTPATIENT
Start: 2021-01-18 | End: 2021-01-18 | Stop reason: HOSPADM

## 2021-01-18 RX ORDER — HYDROMORPHONE HYDROCHLORIDE 1 MG/ML
0.2 INJECTION, SOLUTION INTRAMUSCULAR; INTRAVENOUS; SUBCUTANEOUS EVERY 5 MIN PRN
Status: DISCONTINUED | OUTPATIENT
Start: 2021-01-18 | End: 2021-01-18 | Stop reason: HOSPADM

## 2021-01-18 RX ORDER — PHENYLEPHRINE HYDROCHLORIDE 10 MG/ML
INJECTION INTRAVENOUS
Status: DISCONTINUED | OUTPATIENT
Start: 2021-01-18 | End: 2021-01-18

## 2021-01-18 RX ORDER — SODIUM CHLORIDE 0.9 % (FLUSH) 0.9 %
10 SYRINGE (ML) INJECTION
Status: DISCONTINUED | OUTPATIENT
Start: 2021-01-18 | End: 2021-01-18 | Stop reason: HOSPADM

## 2021-01-18 RX ORDER — PROPOFOL 10 MG/ML
VIAL (ML) INTRAVENOUS
Status: DISCONTINUED | OUTPATIENT
Start: 2021-01-18 | End: 2021-01-18

## 2021-01-18 RX ORDER — ONDANSETRON 2 MG/ML
4 INJECTION INTRAMUSCULAR; INTRAVENOUS DAILY PRN
Status: DISCONTINUED | OUTPATIENT
Start: 2021-01-18 | End: 2021-01-18 | Stop reason: HOSPADM

## 2021-01-18 RX ORDER — METRONIDAZOLE 500 MG/100ML
500 INJECTION, SOLUTION INTRAVENOUS
Status: COMPLETED | OUTPATIENT
Start: 2021-01-18 | End: 2021-01-18

## 2021-01-18 RX ORDER — HYDROMORPHONE HYDROCHLORIDE 1 MG/ML
1 INJECTION, SOLUTION INTRAMUSCULAR; INTRAVENOUS; SUBCUTANEOUS EVERY 4 HOURS PRN
Status: CANCELLED | OUTPATIENT
Start: 2021-01-18

## 2021-01-18 RX ORDER — ROCURONIUM BROMIDE 10 MG/ML
INJECTION, SOLUTION INTRAVENOUS
Status: DISCONTINUED | OUTPATIENT
Start: 2021-01-18 | End: 2021-01-18

## 2021-01-18 RX ORDER — SUCCINYLCHOLINE CHLORIDE 20 MG/ML
INJECTION INTRAMUSCULAR; INTRAVENOUS
Status: DISCONTINUED | OUTPATIENT
Start: 2021-01-18 | End: 2021-01-18

## 2021-01-18 RX ORDER — LIDOCAINE HYDROCHLORIDE 20 MG/ML
INJECTION, SOLUTION EPIDURAL; INFILTRATION; INTRACAUDAL; PERINEURAL
Status: DISCONTINUED | OUTPATIENT
Start: 2021-01-18 | End: 2021-01-18

## 2021-01-18 RX ORDER — DEXAMETHASONE SODIUM PHOSPHATE 4 MG/ML
INJECTION, SOLUTION INTRA-ARTICULAR; INTRALESIONAL; INTRAMUSCULAR; INTRAVENOUS; SOFT TISSUE
Status: DISCONTINUED | OUTPATIENT
Start: 2021-01-18 | End: 2021-01-18

## 2021-01-18 RX ORDER — ACETAMINOPHEN 500 MG
TABLET ORAL
Status: COMPLETED
Start: 2021-01-18 | End: 2021-01-18

## 2021-01-18 RX ORDER — ACETAMINOPHEN 500 MG
1000 TABLET ORAL ONCE
Status: COMPLETED | OUTPATIENT
Start: 2021-01-18 | End: 2021-01-18

## 2021-01-18 RX ORDER — HYDROCODONE BITARTRATE AND ACETAMINOPHEN 5; 325 MG/1; MG/1
1 TABLET ORAL EVERY 4 HOURS PRN
Status: CANCELLED | OUTPATIENT
Start: 2021-01-18

## 2021-01-18 RX ORDER — FENTANYL CITRATE 50 UG/ML
INJECTION, SOLUTION INTRAMUSCULAR; INTRAVENOUS
Status: DISCONTINUED | OUTPATIENT
Start: 2021-01-18 | End: 2021-01-18

## 2021-01-18 RX ORDER — MEPERIDINE HYDROCHLORIDE 50 MG/ML
12.5 INJECTION INTRAMUSCULAR; INTRAVENOUS; SUBCUTANEOUS EVERY 10 MIN PRN
Status: DISCONTINUED | OUTPATIENT
Start: 2021-01-18 | End: 2021-01-18 | Stop reason: HOSPADM

## 2021-01-18 RX ORDER — SODIUM CHLORIDE 9 MG/ML
INJECTION, SOLUTION INTRAVENOUS CONTINUOUS
Status: CANCELLED | OUTPATIENT
Start: 2021-01-18

## 2021-01-18 RX ORDER — CIPROFLOXACIN 2 MG/ML
400 INJECTION, SOLUTION INTRAVENOUS
Status: COMPLETED | OUTPATIENT
Start: 2021-01-18 | End: 2021-01-18

## 2021-01-18 RX ORDER — ONDANSETRON 2 MG/ML
4 INJECTION INTRAMUSCULAR; INTRAVENOUS EVERY 12 HOURS PRN
Status: CANCELLED | OUTPATIENT
Start: 2021-01-18

## 2021-01-18 RX ORDER — LIDOCAINE HYDROCHLORIDE 20 MG/ML
JELLY TOPICAL
Status: DISCONTINUED | OUTPATIENT
Start: 2021-01-18 | End: 2021-01-18

## 2021-01-18 RX ORDER — MIDAZOLAM HYDROCHLORIDE 1 MG/ML
INJECTION INTRAMUSCULAR; INTRAVENOUS
Status: DISCONTINUED | OUTPATIENT
Start: 2021-01-18 | End: 2021-01-18

## 2021-01-18 RX ORDER — SODIUM CHLORIDE 9 MG/ML
INJECTION, SOLUTION INTRAVENOUS CONTINUOUS
Status: DISCONTINUED | OUTPATIENT
Start: 2021-01-18 | End: 2021-01-18 | Stop reason: HOSPADM

## 2021-01-18 RX ORDER — FAMOTIDINE 10 MG/ML
INJECTION INTRAVENOUS
Status: DISCONTINUED | OUTPATIENT
Start: 2021-01-18 | End: 2021-01-18

## 2021-01-18 RX ADMIN — MIDAZOLAM HYDROCHLORIDE 2 MG: 1 INJECTION, SOLUTION INTRAMUSCULAR; INTRAVENOUS at 09:01

## 2021-01-18 RX ADMIN — LIDOCAINE HYDROCHLORIDE 5 ML: 20 JELLY TOPICAL at 09:01

## 2021-01-18 RX ADMIN — PROPOFOL 150 MG: 10 INJECTION, EMULSION INTRAVENOUS at 09:01

## 2021-01-18 RX ADMIN — FENTANYL CITRATE 100 MCG: 50 INJECTION, SOLUTION INTRAMUSCULAR; INTRAVENOUS at 09:01

## 2021-01-18 RX ADMIN — FAMOTIDINE 20 MG: 10 INJECTION, SOLUTION INTRAVENOUS at 09:01

## 2021-01-18 RX ADMIN — ROCURONIUM BROMIDE 40 MG: 10 INJECTION, SOLUTION INTRAVENOUS at 09:01

## 2021-01-18 RX ADMIN — PHENYLEPHRINE HYDROCHLORIDE 200 MCG: 10 INJECTION INTRAVENOUS at 09:01

## 2021-01-18 RX ADMIN — Medication 1000 MG: at 08:01

## 2021-01-18 RX ADMIN — SODIUM CHLORIDE, SODIUM LACTATE, POTASSIUM CHLORIDE, AND CALCIUM CHLORIDE: .6; .31; .03; .02 INJECTION, SOLUTION INTRAVENOUS at 09:01

## 2021-01-18 RX ADMIN — ONDANSETRON 4 MG: 2 INJECTION INTRAMUSCULAR; INTRAVENOUS at 09:01

## 2021-01-18 RX ADMIN — ACETAMINOPHEN 1000 MG: 500 TABLET, FILM COATED ORAL at 08:01

## 2021-01-18 RX ADMIN — METRONIDAZOLE 500 MG: 500 INJECTION, SOLUTION INTRAVENOUS at 09:01

## 2021-01-18 RX ADMIN — SUCCINYLCHOLINE CHLORIDE 160 MG: 20 INJECTION, SOLUTION INTRAMUSCULAR; INTRAVENOUS at 09:01

## 2021-01-18 RX ADMIN — SUGAMMADEX 400 MG: 100 INJECTION, SOLUTION INTRAVENOUS at 09:01

## 2021-01-18 RX ADMIN — ROCURONIUM BROMIDE 10 MG: 10 INJECTION, SOLUTION INTRAVENOUS at 09:01

## 2021-01-18 RX ADMIN — CIPROFLOXACIN 400 MG: 2 INJECTION, SOLUTION INTRAVENOUS at 09:01

## 2021-01-18 RX ADMIN — DEXAMETHASONE SODIUM PHOSPHATE 8 MG: 4 INJECTION, SOLUTION INTRAMUSCULAR; INTRAVENOUS at 09:01

## 2021-01-18 RX ADMIN — LIDOCAINE HYDROCHLORIDE 100 MG: 20 INJECTION, SOLUTION EPIDURAL; INFILTRATION; INTRACAUDAL; PERINEURAL at 09:01

## 2021-01-19 ENCOUNTER — PATIENT MESSAGE (OUTPATIENT)
Dept: OPTOMETRY | Facility: CLINIC | Age: 51
End: 2021-01-19

## 2021-01-21 ENCOUNTER — OFFICE VISIT (OUTPATIENT)
Dept: OPTOMETRY | Facility: CLINIC | Age: 51
End: 2021-01-21
Payer: COMMERCIAL

## 2021-01-21 DIAGNOSIS — Z46.0 CONTACT LENS/GLASSES FITTING: Primary | ICD-10-CM

## 2021-01-21 PROCEDURE — 99499 NO LOS: ICD-10-PCS | Mod: S$GLB,,, | Performed by: OPTOMETRIST

## 2021-01-21 PROCEDURE — 99499 UNLISTED E&M SERVICE: CPT | Mod: S$GLB,,, | Performed by: OPTOMETRIST

## 2021-01-21 PROCEDURE — 1126F AMNT PAIN NOTED NONE PRSNT: CPT | Mod: S$GLB,,, | Performed by: OPTOMETRIST

## 2021-01-21 PROCEDURE — 1126F PR PAIN SEVERITY QUANTIFIED, NO PAIN PRESENT: ICD-10-PCS | Mod: S$GLB,,, | Performed by: OPTOMETRIST

## 2021-02-02 ENCOUNTER — PATIENT MESSAGE (OUTPATIENT)
Dept: OPTOMETRY | Facility: CLINIC | Age: 51
End: 2021-02-02

## 2021-03-30 ENCOUNTER — PATIENT MESSAGE (OUTPATIENT)
Dept: OPTOMETRY | Facility: CLINIC | Age: 51
End: 2021-03-30

## 2021-04-14 ENCOUNTER — LAB VISIT (OUTPATIENT)
Dept: LAB | Facility: HOSPITAL | Age: 51
End: 2021-04-14
Attending: INTERNAL MEDICINE
Payer: COMMERCIAL

## 2021-04-14 DIAGNOSIS — N18.30 STAGE 3 CHRONIC KIDNEY DISEASE, UNSPECIFIED WHETHER STAGE 3A OR 3B CKD: ICD-10-CM

## 2021-04-14 LAB
ALBUMIN SERPL BCP-MCNC: 4.4 G/DL (ref 3.5–5.2)
ANION GAP SERPL CALC-SCNC: 10 MMOL/L (ref 8–16)
BUN SERPL-MCNC: 20 MG/DL (ref 6–20)
CALCIUM SERPL-MCNC: 9.3 MG/DL (ref 8.7–10.5)
CHLORIDE SERPL-SCNC: 103 MMOL/L (ref 95–110)
CO2 SERPL-SCNC: 27 MMOL/L (ref 23–29)
CREAT SERPL-MCNC: 1.5 MG/DL (ref 0.5–1.4)
EST. GFR  (AFRICAN AMERICAN): >60 ML/MIN/1.73 M^2
EST. GFR  (NON AFRICAN AMERICAN): 53.5 ML/MIN/1.73 M^2
GLUCOSE SERPL-MCNC: 102 MG/DL (ref 70–110)
PHOSPHATE SERPL-MCNC: 3.5 MG/DL (ref 2.7–4.5)
POTASSIUM SERPL-SCNC: 4.3 MMOL/L (ref 3.5–5.1)
PTH-INTACT SERPL-MCNC: 44 PG/ML (ref 9–77)
SODIUM SERPL-SCNC: 140 MMOL/L (ref 136–145)

## 2021-04-14 PROCEDURE — 83970 ASSAY OF PARATHORMONE: CPT | Performed by: INTERNAL MEDICINE

## 2021-04-14 PROCEDURE — 80069 RENAL FUNCTION PANEL: CPT | Performed by: INTERNAL MEDICINE

## 2021-04-14 PROCEDURE — 36415 COLL VENOUS BLD VENIPUNCTURE: CPT | Mod: PO | Performed by: INTERNAL MEDICINE

## 2021-04-22 ENCOUNTER — OFFICE VISIT (OUTPATIENT)
Dept: NEPHROLOGY | Facility: CLINIC | Age: 51
End: 2021-04-22
Payer: COMMERCIAL

## 2021-04-22 VITALS
WEIGHT: 193.31 LBS | BODY MASS INDEX: 25.62 KG/M2 | OXYGEN SATURATION: 99 % | DIASTOLIC BLOOD PRESSURE: 78 MMHG | HEART RATE: 69 BPM | SYSTOLIC BLOOD PRESSURE: 122 MMHG | HEIGHT: 73 IN

## 2021-04-22 DIAGNOSIS — Z90.5 HISTORY OF NEPHRECTOMY, UNILATERAL: ICD-10-CM

## 2021-04-22 DIAGNOSIS — N18.30 STAGE 3 CHRONIC KIDNEY DISEASE, UNSPECIFIED WHETHER STAGE 3A OR 3B CKD: Primary | ICD-10-CM

## 2021-04-22 PROCEDURE — 3008F PR BODY MASS INDEX (BMI) DOCUMENTED: ICD-10-PCS | Mod: CPTII,S$GLB,, | Performed by: INTERNAL MEDICINE

## 2021-04-22 PROCEDURE — 99999 PR PBB SHADOW E&M-EST. PATIENT-LVL III: ICD-10-PCS | Mod: PBBFAC,,, | Performed by: INTERNAL MEDICINE

## 2021-04-22 PROCEDURE — 99999 PR PBB SHADOW E&M-EST. PATIENT-LVL III: CPT | Mod: PBBFAC,,, | Performed by: INTERNAL MEDICINE

## 2021-04-22 PROCEDURE — 99214 OFFICE O/P EST MOD 30 MIN: CPT | Mod: S$GLB,,, | Performed by: INTERNAL MEDICINE

## 2021-04-22 PROCEDURE — 99214 PR OFFICE/OUTPT VISIT, EST, LEVL IV, 30-39 MIN: ICD-10-PCS | Mod: S$GLB,,, | Performed by: INTERNAL MEDICINE

## 2021-04-22 PROCEDURE — 3008F BODY MASS INDEX DOCD: CPT | Mod: CPTII,S$GLB,, | Performed by: INTERNAL MEDICINE

## 2021-04-26 ENCOUNTER — HOSPITAL ENCOUNTER (OUTPATIENT)
Dept: RADIOLOGY | Facility: HOSPITAL | Age: 51
Discharge: HOME | End: 2021-04-26
Attending: INTERNAL MEDICINE
Payer: COMMERCIAL

## 2021-04-26 DIAGNOSIS — N18.30 STAGE 3 CHRONIC KIDNEY DISEASE, UNSPECIFIED WHETHER STAGE 3A OR 3B CKD: ICD-10-CM

## 2021-04-26 PROCEDURE — 76770 US EXAM ABDO BACK WALL COMP: CPT | Mod: TC

## 2021-04-26 PROCEDURE — 76770 US RETROPERITONEAL COMPLETE: ICD-10-PCS | Mod: 26,,, | Performed by: RADIOLOGY

## 2021-04-26 PROCEDURE — 76770 US EXAM ABDO BACK WALL COMP: CPT | Mod: 26,,, | Performed by: RADIOLOGY

## 2021-04-29 ENCOUNTER — CLINICAL SUPPORT (OUTPATIENT)
Dept: OTHER | Facility: CLINIC | Age: 51
End: 2021-04-29
Payer: COMMERCIAL

## 2021-04-29 ENCOUNTER — PATIENT MESSAGE (OUTPATIENT)
Dept: FAMILY MEDICINE | Facility: CLINIC | Age: 51
End: 2021-04-29

## 2021-04-29 DIAGNOSIS — Z00.8 ENCOUNTER FOR OTHER GENERAL EXAMINATION: ICD-10-CM

## 2021-04-30 VITALS — HEIGHT: 73 IN | BODY MASS INDEX: 25.51 KG/M2

## 2021-04-30 LAB
HDLC SERPL-MCNC: 32 MG/DL
POC CHOLESTEROL, LDL (DOCK): 80 MG/DL
POC CHOLESTEROL, TOTAL: 160 MG/DL
POC GLUCOSE, FASTING: 97 MG/DL (ref 60–110)
TRIGL SERPL-MCNC: 236 MG/DL

## 2021-05-07 ENCOUNTER — PATIENT MESSAGE (OUTPATIENT)
Dept: FAMILY MEDICINE | Facility: CLINIC | Age: 51
End: 2021-05-07

## 2021-08-03 ENCOUNTER — CLINICAL SUPPORT (OUTPATIENT)
Dept: INTERNAL MEDICINE | Facility: CLINIC | Age: 51
End: 2021-08-03
Payer: COMMERCIAL

## 2021-08-03 DIAGNOSIS — Z00.00 ANNUAL PHYSICAL EXAM: Primary | ICD-10-CM

## 2021-08-03 LAB
25(OH)D3+25(OH)D2 SERPL-MCNC: 65 NG/ML (ref 30–96)
ALBUMIN SERPL BCP-MCNC: 4.6 G/DL (ref 3.5–5.2)
ALP SERPL-CCNC: 72 U/L (ref 55–135)
ALT SERPL W/O P-5'-P-CCNC: 28 U/L (ref 10–44)
ANION GAP SERPL CALC-SCNC: 12 MMOL/L (ref 8–16)
AST SERPL-CCNC: 26 U/L (ref 10–40)
BILIRUB SERPL-MCNC: 0.8 MG/DL (ref 0.1–1)
BUN SERPL-MCNC: 18 MG/DL (ref 6–20)
CALCIUM SERPL-MCNC: 9.6 MG/DL (ref 8.7–10.5)
CHLORIDE SERPL-SCNC: 102 MMOL/L (ref 95–110)
CHOLEST SERPL-MCNC: 143 MG/DL (ref 120–199)
CHOLEST/HDLC SERPL: 3.2 {RATIO} (ref 2–5)
CO2 SERPL-SCNC: 27 MMOL/L (ref 23–29)
COMPLEXED PSA SERPL-MCNC: 0.89 NG/ML (ref 0–4)
CREAT SERPL-MCNC: 1.7 MG/DL (ref 0.5–1.4)
ERYTHROCYTE [DISTWIDTH] IN BLOOD BY AUTOMATED COUNT: 13.2 % (ref 11.5–14.5)
EST. GFR  (AFRICAN AMERICAN): 53 ML/MIN/1.73 M^2
EST. GFR  (NON AFRICAN AMERICAN): 46 ML/MIN/1.73 M^2
ESTIMATED AVG GLUCOSE: 111 MG/DL (ref 68–131)
GLUCOSE SERPL-MCNC: 95 MG/DL (ref 70–110)
HBA1C MFR BLD: 5.5 % (ref 4–5.6)
HCT VFR BLD AUTO: 43.8 % (ref 40–54)
HDLC SERPL-MCNC: 45 MG/DL (ref 40–75)
HDLC SERPL: 31.5 % (ref 20–50)
HGB BLD-MCNC: 14.9 G/DL (ref 14–18)
LDLC SERPL CALC-MCNC: 70.8 MG/DL (ref 63–159)
MCH RBC QN AUTO: 30.2 PG (ref 27–31)
MCHC RBC AUTO-ENTMCNC: 34 G/DL (ref 32–36)
MCV RBC AUTO: 89 FL (ref 82–98)
NONHDLC SERPL-MCNC: 98 MG/DL
PLATELET # BLD AUTO: 278 K/UL (ref 150–450)
PMV BLD AUTO: 9.4 FL (ref 9.2–12.9)
POTASSIUM SERPL-SCNC: 4.2 MMOL/L (ref 3.5–5.1)
PROT SERPL-MCNC: 7.5 G/DL (ref 6–8.4)
RBC # BLD AUTO: 4.94 M/UL (ref 4.6–6.2)
SARS-COV-2 IGG SERPL IA-ACNC: 79.1 AU/ML
SARS-COV-2 IGG SERPL QL IA: POSITIVE
SODIUM SERPL-SCNC: 141 MMOL/L (ref 136–145)
TRIGL SERPL-MCNC: 136 MG/DL (ref 30–150)
TSH SERPL DL<=0.005 MIU/L-ACNC: 1.34 UIU/ML (ref 0.4–4)
WBC # BLD AUTO: 7.22 K/UL (ref 3.9–12.7)

## 2021-08-03 PROCEDURE — 84443 ASSAY THYROID STIM HORMONE: CPT | Performed by: FAMILY MEDICINE

## 2021-08-03 PROCEDURE — 85027 COMPLETE CBC AUTOMATED: CPT | Mod: PO | Performed by: FAMILY MEDICINE

## 2021-08-03 PROCEDURE — 83036 HEMOGLOBIN GLYCOSYLATED A1C: CPT | Performed by: FAMILY MEDICINE

## 2021-08-03 PROCEDURE — 82306 VITAMIN D 25 HYDROXY: CPT | Performed by: FAMILY MEDICINE

## 2021-08-03 PROCEDURE — 84153 ASSAY OF PSA TOTAL: CPT | Performed by: FAMILY MEDICINE

## 2021-08-03 PROCEDURE — 80053 COMPREHEN METABOLIC PANEL: CPT | Mod: PO | Performed by: FAMILY MEDICINE

## 2021-08-03 PROCEDURE — 80061 LIPID PANEL: CPT | Performed by: FAMILY MEDICINE

## 2021-08-03 PROCEDURE — 86769 SARS-COV-2 COVID-19 ANTIBODY: CPT | Performed by: FAMILY MEDICINE

## 2021-08-16 ENCOUNTER — OFFICE VISIT (OUTPATIENT)
Dept: FAMILY MEDICINE | Facility: CLINIC | Age: 51
End: 2021-08-16
Payer: COMMERCIAL

## 2021-08-16 VITALS
BODY MASS INDEX: 26.03 KG/M2 | SYSTOLIC BLOOD PRESSURE: 140 MMHG | WEIGHT: 196.44 LBS | HEIGHT: 73 IN | TEMPERATURE: 98 F | DIASTOLIC BLOOD PRESSURE: 82 MMHG | HEART RATE: 59 BPM | OXYGEN SATURATION: 98 %

## 2021-08-16 DIAGNOSIS — Z00.00 WELLNESS EXAMINATION: Primary | ICD-10-CM

## 2021-08-16 PROBLEM — K62.9 ANAL LESION: Status: RESOLVED | Noted: 2021-01-18 | Resolved: 2021-08-16

## 2021-08-16 PROBLEM — K62.5 RECTAL BLEEDING: Status: RESOLVED | Noted: 2020-08-14 | Resolved: 2021-08-16

## 2021-08-16 PROBLEM — N28.89 RIGHT RENAL MASS: Status: RESOLVED | Noted: 2020-09-02 | Resolved: 2021-08-16

## 2021-08-16 PROCEDURE — 3077F PR MOST RECENT SYSTOLIC BLOOD PRESSURE >= 140 MM HG: ICD-10-PCS | Mod: CPTII,S$GLB,, | Performed by: FAMILY MEDICINE

## 2021-08-16 PROCEDURE — 1160F PR REVIEW ALL MEDS BY PRESCRIBER/CLIN PHARMACIST DOCUMENTED: ICD-10-PCS | Mod: CPTII,S$GLB,, | Performed by: FAMILY MEDICINE

## 2021-08-16 PROCEDURE — 1160F RVW MEDS BY RX/DR IN RCRD: CPT | Mod: CPTII,S$GLB,, | Performed by: FAMILY MEDICINE

## 2021-08-16 PROCEDURE — 3008F BODY MASS INDEX DOCD: CPT | Mod: CPTII,S$GLB,, | Performed by: FAMILY MEDICINE

## 2021-08-16 PROCEDURE — 1159F PR MEDICATION LIST DOCUMENTED IN MEDICAL RECORD: ICD-10-PCS | Mod: CPTII,S$GLB,, | Performed by: FAMILY MEDICINE

## 2021-08-16 PROCEDURE — 3077F SYST BP >= 140 MM HG: CPT | Mod: CPTII,S$GLB,, | Performed by: FAMILY MEDICINE

## 2021-08-16 PROCEDURE — 99396 PREV VISIT EST AGE 40-64: CPT | Mod: S$GLB,,, | Performed by: FAMILY MEDICINE

## 2021-08-16 PROCEDURE — 3044F PR MOST RECENT HEMOGLOBIN A1C LEVEL <7.0%: ICD-10-PCS | Mod: CPTII,S$GLB,, | Performed by: FAMILY MEDICINE

## 2021-08-16 PROCEDURE — 99999 PR PBB SHADOW E&M-EST. PATIENT-LVL III: CPT | Mod: PBBFAC,,, | Performed by: FAMILY MEDICINE

## 2021-08-16 PROCEDURE — 3044F HG A1C LEVEL LT 7.0%: CPT | Mod: CPTII,S$GLB,, | Performed by: FAMILY MEDICINE

## 2021-08-16 PROCEDURE — 3008F PR BODY MASS INDEX (BMI) DOCUMENTED: ICD-10-PCS | Mod: CPTII,S$GLB,, | Performed by: FAMILY MEDICINE

## 2021-08-16 PROCEDURE — 3079F DIAST BP 80-89 MM HG: CPT | Mod: CPTII,S$GLB,, | Performed by: FAMILY MEDICINE

## 2021-08-16 PROCEDURE — 3079F PR MOST RECENT DIASTOLIC BLOOD PRESSURE 80-89 MM HG: ICD-10-PCS | Mod: CPTII,S$GLB,, | Performed by: FAMILY MEDICINE

## 2021-08-16 PROCEDURE — 1159F MED LIST DOCD IN RCRD: CPT | Mod: CPTII,S$GLB,, | Performed by: FAMILY MEDICINE

## 2021-08-16 PROCEDURE — 99999 PR PBB SHADOW E&M-EST. PATIENT-LVL III: ICD-10-PCS | Mod: PBBFAC,,, | Performed by: FAMILY MEDICINE

## 2021-08-16 PROCEDURE — 99396 PR PREVENTIVE VISIT,EST,40-64: ICD-10-PCS | Mod: S$GLB,,, | Performed by: FAMILY MEDICINE

## 2021-08-16 RX ORDER — BUTALBITAL, ACETAMINOPHEN AND CAFFEINE 50; 325; 40 MG/1; MG/1; MG/1
1 TABLET ORAL EVERY 6 HOURS PRN
Qty: 30 TABLET | Refills: 1 | Status: SHIPPED | OUTPATIENT
Start: 2021-08-16 | End: 2021-09-15

## 2021-09-09 ENCOUNTER — PATIENT MESSAGE (OUTPATIENT)
Dept: FAMILY MEDICINE | Facility: CLINIC | Age: 51
End: 2021-09-09

## 2021-09-15 ENCOUNTER — PATIENT MESSAGE (OUTPATIENT)
Dept: NEPHROLOGY | Facility: CLINIC | Age: 51
End: 2021-09-15

## 2021-09-16 ENCOUNTER — PATIENT MESSAGE (OUTPATIENT)
Dept: NEPHROLOGY | Facility: CLINIC | Age: 51
End: 2021-09-16

## 2021-10-04 ENCOUNTER — PATIENT MESSAGE (OUTPATIENT)
Dept: NEPHROLOGY | Facility: CLINIC | Age: 51
End: 2021-10-04

## 2021-10-21 ENCOUNTER — LAB VISIT (OUTPATIENT)
Dept: LAB | Facility: HOSPITAL | Age: 51
End: 2021-10-21
Attending: INTERNAL MEDICINE
Payer: COMMERCIAL

## 2021-10-21 DIAGNOSIS — N18.30 STAGE 3 CHRONIC KIDNEY DISEASE, UNSPECIFIED WHETHER STAGE 3A OR 3B CKD: ICD-10-CM

## 2021-10-21 LAB
25(OH)D3+25(OH)D2 SERPL-MCNC: 70 NG/ML (ref 30–96)
ALBUMIN SERPL BCP-MCNC: 4.5 G/DL (ref 3.5–5.2)
ANION GAP SERPL CALC-SCNC: 9 MMOL/L (ref 8–16)
BUN SERPL-MCNC: 18 MG/DL (ref 6–20)
CALCIUM SERPL-MCNC: 9.8 MG/DL (ref 8.7–10.5)
CHLORIDE SERPL-SCNC: 100 MMOL/L (ref 95–110)
CO2 SERPL-SCNC: 29 MMOL/L (ref 23–29)
CREAT SERPL-MCNC: 1.5 MG/DL (ref 0.5–1.4)
EST. GFR  (AFRICAN AMERICAN): >60 ML/MIN/1.73 M^2
EST. GFR  (NON AFRICAN AMERICAN): 53.5 ML/MIN/1.73 M^2
GLUCOSE SERPL-MCNC: 99 MG/DL (ref 70–110)
PHOSPHATE SERPL-MCNC: 3.2 MG/DL (ref 2.7–4.5)
POTASSIUM SERPL-SCNC: 4.1 MMOL/L (ref 3.5–5.1)
PTH-INTACT SERPL-MCNC: 36.2 PG/ML (ref 9–77)
SODIUM SERPL-SCNC: 138 MMOL/L (ref 136–145)

## 2021-10-21 PROCEDURE — 82306 VITAMIN D 25 HYDROXY: CPT | Performed by: INTERNAL MEDICINE

## 2021-10-21 PROCEDURE — 80069 RENAL FUNCTION PANEL: CPT | Performed by: INTERNAL MEDICINE

## 2021-10-21 PROCEDURE — 83970 ASSAY OF PARATHORMONE: CPT | Performed by: INTERNAL MEDICINE

## 2021-10-21 PROCEDURE — 36415 COLL VENOUS BLD VENIPUNCTURE: CPT | Mod: PO | Performed by: INTERNAL MEDICINE

## 2021-11-02 ENCOUNTER — LAB VISIT (OUTPATIENT)
Dept: PRIMARY CARE CLINIC | Facility: CLINIC | Age: 51
End: 2021-11-02

## 2021-11-02 DIAGNOSIS — Z11.52 ENCOUNTER FOR SCREENING FOR COVID-19: Primary | ICD-10-CM

## 2021-11-02 LAB
CTP QC/QA: YES
SARS-COV-2 AG RESP QL IA.RAPID: NEGATIVE

## 2021-11-05 ENCOUNTER — OFFICE VISIT (OUTPATIENT)
Dept: NEPHROLOGY | Facility: CLINIC | Age: 51
End: 2021-11-05
Payer: COMMERCIAL

## 2021-11-05 VITALS
WEIGHT: 191.56 LBS | SYSTOLIC BLOOD PRESSURE: 118 MMHG | HEART RATE: 88 BPM | OXYGEN SATURATION: 99 % | BODY MASS INDEX: 25.28 KG/M2 | DIASTOLIC BLOOD PRESSURE: 80 MMHG

## 2021-11-05 DIAGNOSIS — N18.30 STAGE 3 CHRONIC KIDNEY DISEASE, UNSPECIFIED WHETHER STAGE 3A OR 3B CKD: Primary | ICD-10-CM

## 2021-11-05 DIAGNOSIS — Z90.5 HISTORY OF NEPHRECTOMY, UNILATERAL: ICD-10-CM

## 2021-11-05 PROCEDURE — 3044F PR MOST RECENT HEMOGLOBIN A1C LEVEL <7.0%: ICD-10-PCS | Mod: CPTII,S$GLB,, | Performed by: INTERNAL MEDICINE

## 2021-11-05 PROCEDURE — 3074F SYST BP LT 130 MM HG: CPT | Mod: CPTII,S$GLB,, | Performed by: INTERNAL MEDICINE

## 2021-11-05 PROCEDURE — 3079F DIAST BP 80-89 MM HG: CPT | Mod: CPTII,S$GLB,, | Performed by: INTERNAL MEDICINE

## 2021-11-05 PROCEDURE — 3044F HG A1C LEVEL LT 7.0%: CPT | Mod: CPTII,S$GLB,, | Performed by: INTERNAL MEDICINE

## 2021-11-05 PROCEDURE — 3066F NEPHROPATHY DOC TX: CPT | Mod: CPTII,S$GLB,, | Performed by: INTERNAL MEDICINE

## 2021-11-05 PROCEDURE — 99214 OFFICE O/P EST MOD 30 MIN: CPT | Mod: S$GLB,,, | Performed by: INTERNAL MEDICINE

## 2021-11-05 PROCEDURE — 3079F PR MOST RECENT DIASTOLIC BLOOD PRESSURE 80-89 MM HG: ICD-10-PCS | Mod: CPTII,S$GLB,, | Performed by: INTERNAL MEDICINE

## 2021-11-05 PROCEDURE — 1160F PR REVIEW ALL MEDS BY PRESCRIBER/CLIN PHARMACIST DOCUMENTED: ICD-10-PCS | Mod: CPTII,S$GLB,, | Performed by: INTERNAL MEDICINE

## 2021-11-05 PROCEDURE — 1159F PR MEDICATION LIST DOCUMENTED IN MEDICAL RECORD: ICD-10-PCS | Mod: CPTII,S$GLB,, | Performed by: INTERNAL MEDICINE

## 2021-11-05 PROCEDURE — 3008F BODY MASS INDEX DOCD: CPT | Mod: CPTII,S$GLB,, | Performed by: INTERNAL MEDICINE

## 2021-11-05 PROCEDURE — 1159F MED LIST DOCD IN RCRD: CPT | Mod: CPTII,S$GLB,, | Performed by: INTERNAL MEDICINE

## 2021-11-05 PROCEDURE — 3074F PR MOST RECENT SYSTOLIC BLOOD PRESSURE < 130 MM HG: ICD-10-PCS | Mod: CPTII,S$GLB,, | Performed by: INTERNAL MEDICINE

## 2021-11-05 PROCEDURE — 99999 PR PBB SHADOW E&M-EST. PATIENT-LVL III: CPT | Mod: PBBFAC,,, | Performed by: INTERNAL MEDICINE

## 2021-11-05 PROCEDURE — 99214 PR OFFICE/OUTPT VISIT, EST, LEVL IV, 30-39 MIN: ICD-10-PCS | Mod: S$GLB,,, | Performed by: INTERNAL MEDICINE

## 2021-11-05 PROCEDURE — 3008F PR BODY MASS INDEX (BMI) DOCUMENTED: ICD-10-PCS | Mod: CPTII,S$GLB,, | Performed by: INTERNAL MEDICINE

## 2021-11-05 PROCEDURE — 1160F RVW MEDS BY RX/DR IN RCRD: CPT | Mod: CPTII,S$GLB,, | Performed by: INTERNAL MEDICINE

## 2021-11-05 PROCEDURE — 99999 PR PBB SHADOW E&M-EST. PATIENT-LVL III: ICD-10-PCS | Mod: PBBFAC,,, | Performed by: INTERNAL MEDICINE

## 2021-11-05 PROCEDURE — 3066F PR DOCUMENTATION OF TREATMENT FOR NEPHROPATHY: ICD-10-PCS | Mod: CPTII,S$GLB,, | Performed by: INTERNAL MEDICINE

## 2021-11-16 ENCOUNTER — LAB VISIT (OUTPATIENT)
Dept: PRIMARY CARE CLINIC | Facility: CLINIC | Age: 51
End: 2021-11-16

## 2021-11-16 DIAGNOSIS — Z11.52 ENCOUNTER FOR SCREENING FOR COVID-19: Primary | ICD-10-CM

## 2021-11-16 LAB
CTP QC/QA: YES
SARS-COV-2 AG RESP QL IA.RAPID: NEGATIVE

## 2021-11-30 ENCOUNTER — LAB VISIT (OUTPATIENT)
Dept: PRIMARY CARE CLINIC | Facility: CLINIC | Age: 51
End: 2021-11-30

## 2021-11-30 DIAGNOSIS — Z11.52 ENCOUNTER FOR SCREENING FOR COVID-19: Primary | ICD-10-CM

## 2021-11-30 LAB
CTP QC/QA: YES
SARS-COV-2 AG RESP QL IA.RAPID: NEGATIVE

## 2021-12-07 ENCOUNTER — LAB VISIT (OUTPATIENT)
Dept: PRIMARY CARE CLINIC | Facility: CLINIC | Age: 51
End: 2021-12-07

## 2021-12-07 DIAGNOSIS — Z11.52 ENCOUNTER FOR SCREENING FOR COVID-19: Primary | ICD-10-CM

## 2021-12-07 LAB
CTP QC/QA: YES
SARS-COV-2 AG RESP QL IA.RAPID: NEGATIVE

## 2021-12-07 PROCEDURE — 87811 SARS-COV-2 COVID19 W/OPTIC: CPT | Mod: S$GLB,,, | Performed by: PREVENTIVE MEDICINE

## 2021-12-07 PROCEDURE — 87811 SARS CORONAVIRUS 2 ANTIGEN POCT, MANUAL READ: ICD-10-PCS | Mod: S$GLB,,, | Performed by: PREVENTIVE MEDICINE

## 2021-12-14 ENCOUNTER — LAB VISIT (OUTPATIENT)
Dept: PRIMARY CARE CLINIC | Facility: CLINIC | Age: 51
End: 2021-12-14

## 2021-12-14 ENCOUNTER — OCCUPATIONAL HEALTH (OUTPATIENT)
Dept: URGENT CARE | Facility: CLINIC | Age: 51
End: 2021-12-14

## 2021-12-14 DIAGNOSIS — Z11.52 ENCOUNTER FOR SCREENING FOR COVID-19: Primary | ICD-10-CM

## 2021-12-14 LAB
CTP QC/QA: YES
SARS-COV-2 AG RESP QL IA.RAPID: NEGATIVE

## 2021-12-14 PROCEDURE — 87811 SARS-COV-2 COVID19 W/OPTIC: CPT | Mod: S$GLB,,, | Performed by: PREVENTIVE MEDICINE

## 2021-12-14 PROCEDURE — 80305 DRUG TEST PRSMV DIR OPT OBS: CPT | Mod: S$GLB,,, | Performed by: EMERGENCY MEDICINE

## 2021-12-14 PROCEDURE — 87811 SARS CORONAVIRUS 2 ANTIGEN POCT, MANUAL READ: ICD-10-PCS | Mod: S$GLB,,, | Performed by: PREVENTIVE MEDICINE

## 2021-12-14 PROCEDURE — 80305 PR DRUG SCREEN - 1: ICD-10-PCS | Mod: S$GLB,,, | Performed by: EMERGENCY MEDICINE

## 2022-01-08 ENCOUNTER — PATIENT MESSAGE (OUTPATIENT)
Dept: OPTOMETRY | Facility: CLINIC | Age: 52
End: 2022-01-08
Payer: COMMERCIAL

## 2022-02-01 ENCOUNTER — OFFICE VISIT (OUTPATIENT)
Dept: OPTOMETRY | Facility: CLINIC | Age: 52
End: 2022-02-01
Payer: COMMERCIAL

## 2022-02-01 ENCOUNTER — LAB VISIT (OUTPATIENT)
Dept: PRIMARY CARE CLINIC | Facility: CLINIC | Age: 52
End: 2022-02-01
Payer: COMMERCIAL

## 2022-02-01 DIAGNOSIS — Z11.52 ENCOUNTER FOR SCREENING FOR COVID-19: Primary | ICD-10-CM

## 2022-02-01 DIAGNOSIS — H04.123 DRY EYE SYNDROME, BILATERAL: ICD-10-CM

## 2022-02-01 DIAGNOSIS — H52.4 PRESBYOPIA: ICD-10-CM

## 2022-02-01 DIAGNOSIS — Z01.00 EXAMINATION OF EYES AND VISION: Primary | ICD-10-CM

## 2022-02-01 DIAGNOSIS — H52.7 REFRACTIVE ERROR: ICD-10-CM

## 2022-02-01 LAB
CTP QC/QA: YES
SARS-COV-2 AG RESP QL IA.RAPID: NEGATIVE

## 2022-02-01 PROCEDURE — 99999 PR PBB SHADOW E&M-EST. PATIENT-LVL III: ICD-10-PCS | Mod: PBBFAC,,, | Performed by: OPTOMETRIST

## 2022-02-01 PROCEDURE — 92015 DETERMINE REFRACTIVE STATE: CPT | Mod: S$GLB,,, | Performed by: OPTOMETRIST

## 2022-02-01 PROCEDURE — 92014 PR EYE EXAM, EST PATIENT,COMPREHESV: ICD-10-PCS | Mod: S$GLB,,, | Performed by: OPTOMETRIST

## 2022-02-01 PROCEDURE — 87811 SARS CORONAVIRUS 2 ANTIGEN POCT, MANUAL READ: ICD-10-PCS | Mod: S$GLB,,, | Performed by: PREVENTIVE MEDICINE

## 2022-02-01 PROCEDURE — 92015 PR REFRACTION: ICD-10-PCS | Mod: S$GLB,,, | Performed by: OPTOMETRIST

## 2022-02-01 PROCEDURE — 99999 PR PBB SHADOW E&M-EST. PATIENT-LVL III: CPT | Mod: PBBFAC,,, | Performed by: OPTOMETRIST

## 2022-02-01 PROCEDURE — 87811 SARS-COV-2 COVID19 W/OPTIC: CPT | Mod: S$GLB,,, | Performed by: PREVENTIVE MEDICINE

## 2022-02-01 PROCEDURE — 92014 COMPRE OPH EXAM EST PT 1/>: CPT | Mod: S$GLB,,, | Performed by: OPTOMETRIST

## 2022-02-01 RX ORDER — PILOCARPINE HYDROCHLORIDE 12.5 MG/ML
1 SOLUTION/ DROPS OPHTHALMIC DAILY PRN
Qty: 2.5 ML | Refills: 6 | Status: SHIPPED | OUTPATIENT
Start: 2022-02-01 | End: 2022-08-25

## 2022-02-01 NOTE — PROGRESS NOTES
HPI     Pt here today for annual exam.  States decreased vision at both near &   distance.   Trouble with night driving. Has bifocals but prefers to wear   just OTC readers.    Denies any headaches or eye pain.    (-) allergies / dry eyes  (-) gtts  (-) floaters or light flashes      Last edited by Brenton Alejo, OD on 2/1/2022  7:45 AM. (History)            Assessment /Plan     For exam results, see Encounter Report.    Examination of eyes and vision    Refractive error    Presbyopia  -     pilocarpine HCl (VUITY) 1.25 % Drop; Apply 1 drop to eye daily as needed.  Dispense: 2.5 mL; Refill: 6    Dry eye syndrome, bilateral      1. Examination of eyes and vision    2. Refractive error  Dispensed updated spectacle Rx. Discussed various spectacle lens options, discussed SVL for distance, ok to keep otc readers for near. Ok to try otc +1.00 for distance.      3. Presbyopia  Discussed options and possible side affects, rx vuity: 1 gt once daily prn for near vision.     - pilocarpine HCl (VUITY) 1.25 % Drop; Apply 1 drop to eye daily as needed.  Dispense: 2.5 mL; Refill: 6    4. Dry eye syndrome, bilateral  SPK inferior OU. Discussed ocular affects of dry eyes. Recommend OTC artificial tears 2-4 times a day in both eyes. Discussed chronicity of RHIANNON. RTC if symptoms not alleviated by continued use of artificial tears.       RTC in 1 year for comprehensive eye exam, or sooner prn.

## 2022-02-04 ENCOUNTER — TELEPHONE (OUTPATIENT)
Dept: PHARMACY | Facility: CLINIC | Age: 52
End: 2022-02-04
Payer: COMMERCIAL

## 2022-02-22 ENCOUNTER — OFFICE VISIT (OUTPATIENT)
Dept: UROLOGY | Facility: CLINIC | Age: 52
End: 2022-02-22
Payer: COMMERCIAL

## 2022-02-22 VITALS
SYSTOLIC BLOOD PRESSURE: 127 MMHG | DIASTOLIC BLOOD PRESSURE: 86 MMHG | BODY MASS INDEX: 25.58 KG/M2 | HEART RATE: 82 BPM | WEIGHT: 193 LBS | HEIGHT: 73 IN | RESPIRATION RATE: 18 BRPM

## 2022-02-22 DIAGNOSIS — Z12.5 PROSTATE CANCER SCREENING: ICD-10-CM

## 2022-02-22 DIAGNOSIS — C64.1 RENAL CELL CARCINOMA OF RIGHT KIDNEY: Primary | ICD-10-CM

## 2022-02-22 DIAGNOSIS — R31.29 MICROHEMATURIA: ICD-10-CM

## 2022-02-22 DIAGNOSIS — N40.1 BPH WITH OBSTRUCTION/LOWER URINARY TRACT SYMPTOMS: ICD-10-CM

## 2022-02-22 DIAGNOSIS — N13.8 BPH WITH OBSTRUCTION/LOWER URINARY TRACT SYMPTOMS: ICD-10-CM

## 2022-02-22 LAB
BACTERIA #/AREA URNS HPF: NORMAL /HPF
BILIRUB SERPL-MCNC: ABNORMAL MG/DL
BLOOD URINE, POC: ABNORMAL
CLARITY, POC UA: CLEAR
COLOR, POC UA: YELLOW
GLUCOSE UR QL STRIP: ABNORMAL
KETONES UR QL STRIP: ABNORMAL
LEUKOCYTE ESTERASE URINE, POC: ABNORMAL
MICROSCOPIC COMMENT: NORMAL
NITRITE, POC UA: ABNORMAL
PH, POC UA: 7
POC RESIDUAL URINE VOLUME: 84 ML (ref 0–100)
PROTEIN, POC: ABNORMAL
SPECIFIC GRAVITY, POC UA: 1.01
UROBILINOGEN, POC UA: 0.2

## 2022-02-22 PROCEDURE — 99999 PR PBB SHADOW E&M-EST. PATIENT-LVL IV: CPT | Mod: PBBFAC,,, | Performed by: UROLOGY

## 2022-02-22 PROCEDURE — 99999 PR PBB SHADOW E&M-EST. PATIENT-LVL IV: ICD-10-PCS | Mod: PBBFAC,,, | Performed by: UROLOGY

## 2022-02-22 PROCEDURE — 51798 US URINE CAPACITY MEASURE: CPT | Mod: S$GLB,,, | Performed by: UROLOGY

## 2022-02-22 PROCEDURE — 3008F BODY MASS INDEX DOCD: CPT | Mod: CPTII,S$GLB,, | Performed by: UROLOGY

## 2022-02-22 PROCEDURE — 1160F PR REVIEW ALL MEDS BY PRESCRIBER/CLIN PHARMACIST DOCUMENTED: ICD-10-PCS | Mod: CPTII,S$GLB,, | Performed by: UROLOGY

## 2022-02-22 PROCEDURE — 3079F DIAST BP 80-89 MM HG: CPT | Mod: CPTII,S$GLB,, | Performed by: UROLOGY

## 2022-02-22 PROCEDURE — 81000 URINALYSIS NONAUTO W/SCOPE: CPT | Performed by: UROLOGY

## 2022-02-22 PROCEDURE — 3074F SYST BP LT 130 MM HG: CPT | Mod: CPTII,S$GLB,, | Performed by: UROLOGY

## 2022-02-22 PROCEDURE — 99215 PR OFFICE/OUTPT VISIT, EST, LEVL V, 40-54 MIN: ICD-10-PCS | Mod: S$GLB,,, | Performed by: UROLOGY

## 2022-02-22 PROCEDURE — 81002 POCT URINE DIPSTICK WITHOUT MICROSCOPE: ICD-10-PCS | Mod: S$GLB,,, | Performed by: UROLOGY

## 2022-02-22 PROCEDURE — 1159F PR MEDICATION LIST DOCUMENTED IN MEDICAL RECORD: ICD-10-PCS | Mod: CPTII,S$GLB,, | Performed by: UROLOGY

## 2022-02-22 PROCEDURE — 1160F RVW MEDS BY RX/DR IN RCRD: CPT | Mod: CPTII,S$GLB,, | Performed by: UROLOGY

## 2022-02-22 PROCEDURE — 81002 URINALYSIS NONAUTO W/O SCOPE: CPT | Mod: S$GLB,,, | Performed by: UROLOGY

## 2022-02-22 PROCEDURE — 99215 OFFICE O/P EST HI 40 MIN: CPT | Mod: S$GLB,,, | Performed by: UROLOGY

## 2022-02-22 PROCEDURE — 3079F PR MOST RECENT DIASTOLIC BLOOD PRESSURE 80-89 MM HG: ICD-10-PCS | Mod: CPTII,S$GLB,, | Performed by: UROLOGY

## 2022-02-22 PROCEDURE — 3008F PR BODY MASS INDEX (BMI) DOCUMENTED: ICD-10-PCS | Mod: CPTII,S$GLB,, | Performed by: UROLOGY

## 2022-02-22 PROCEDURE — 1159F MED LIST DOCD IN RCRD: CPT | Mod: CPTII,S$GLB,, | Performed by: UROLOGY

## 2022-02-22 PROCEDURE — 3074F PR MOST RECENT SYSTOLIC BLOOD PRESSURE < 130 MM HG: ICD-10-PCS | Mod: CPTII,S$GLB,, | Performed by: UROLOGY

## 2022-02-22 PROCEDURE — 51798 POCT BLADDER SCAN: ICD-10-PCS | Mod: S$GLB,,, | Performed by: UROLOGY

## 2022-02-22 RX ORDER — TAMSULOSIN HYDROCHLORIDE 0.4 MG/1
0.4 CAPSULE ORAL NIGHTLY
Qty: 30 CAPSULE | Refills: 11 | Status: SHIPPED | OUTPATIENT
Start: 2022-02-22 | End: 2023-03-20 | Stop reason: SDUPTHER

## 2022-02-22 RX ORDER — DOXYCYCLINE 100 MG/1
100 CAPSULE ORAL 2 TIMES DAILY
Qty: 20 CAPSULE | Refills: 0 | Status: SHIPPED | OUTPATIENT
Start: 2022-02-22 | End: 2022-03-04

## 2022-02-22 NOTE — PROGRESS NOTES
[facility] Urology Progress Note    Valentin Johnson is a 51 y.o. male who presents for urologic follow up, with history of RCC and concerns about his voiding habits    He was incidentally found to have approximate 3cm mid to lower pole endophytic central solid enhancing right renal mass concerning for malignancy. After extensive discussion and imaging review, pt elected to proceed with robot-assisted laparascopic partial vs radical nephrectomy.     9/2/20 - right robotic assisted lap radical nephrectomy, after intraop U/S localization of central endophytic renal mass revealed no margin for partial resection  Did well ans DCed home on POD 1 with only mild expected postop renal function decline. Preop Cr normal, and healthy without comorbidities so when postop Cr 1.3/eGFR >60, utilized only 2 doses of toradol Q8 rather than Q6 for narcotic sparing protocol, with Cr up to 1.7 in AM so DCed, and settled at 1.6 at discharge afternoon POD1.  Repeat assessment of renal function 10 days later stable with Cr 1.6/eGFR 50 on 9/13/20  PATHOLOGY:  [id number] chromophone RCC noted to abut but not invade renal sinus fat     He has not followed up since his initial postop visit but notes he has followed with Dr Leonardo nephrology  4/26/21 FELIX negative except simple L renal cyst  This also demonstrated prostate was 4.5 x 3.6 x 3.4 cm, aproximately 29 g.   His preop creatinine was 1-1.2, and his baseline has subtle out at 1.4-1.5, most recently 1.5 with EGFR of 53.5.  Continues to follow with Nephrology.  Last PSA on file is 0.89.  Presents today concerned that he is urinating all the time.  He also is having cramping in his back which radiates to the front and bilateral thighs.  He was having initial concerns postoperatively of pain on the right side or kidney used to be, but of late has been having concerns of left-sided pain on the side of his solitary kidney and strained pain which has been attributed to diverticulosis and IBS.   "But now he is feeling pain around the back to the left side.  Has baseline back in musculoskeletal pain but feels that this is much deeper.  He also notes to be urinating all the time.  AUA symptom score:  17/3 (3:  Emptying, frequency; 3:  Urgency, weak stream; 2:  Straining, sleeping; 1:  Intermittency.  He does have some urinary hesitancy with prolonged stream and flow  Postvoid residuals 84 cc by BladderScan.  Urinalysis dipstick shows trace blood.  He also still has some bright red blood per rectum with hemorrhaging bleeding occasionally.  But denies constipation.  Father had prostate cancer.      Focused Physical Exam:    Vitals:    02/22/22 1108   BP: 127/86   Pulse: 82   Resp: 18     Body mass index is 25.46 kg/m². Weight: 87.5 kg (193 lb) Height: 6' 1" (185.4 cm)     Abdomen: Soft, non-tender, nondistended, no CVA tenderness  :  circ normal phallus without plaques/lesions, orthotopic urethral meatus, bilaterally desc testes in normal scrotum without mass or lesion  Back:  Reproducible tenderness to palpation left lower back  NGUYEN: normal sphincter tone, no masses, no hemmorrhoids   PROSTATE: 25g, no nodules, non-tender, symmetrical.       Recent Results (from the past 336 hour(s))   POCT URINE DIPSTICK WITHOUT MICROSCOPE    Collection Time: 02/22/22 11:19 AM   Result Value Ref Range    Color, UA Yellow     pH, UA 7     WBC, UA neg     Nitrite, UA neg     Protein, POC neg     Glucose, UA neg     Ketones, UA neg     Urobilinogen, UA 0.2     Bilirubin, POC neg     Blood, UA trace     Clarity, UA Clear     Spec Grav UA 1.010    POCT Bladder Scan    Collection Time: 02/22/22 11:20 AM   Result Value Ref Range    POC Residual Urine Volume 84 0 - 100 mL       ASSESSMENT   1. Renal cell carcinoma of right kidney  POCT Bladder Scan    POCT URINE DIPSTICK WITHOUT MICROSCOPE    Creatinine, Serum    MRI Abdomen W WO Contrast   2. BPH with obstruction/lower urinary tract symptoms  Uroflowmetry   3. Prostate cancer " screening  PSA, Screening   4. Microhematuria  Urinalysis Microscopic       Plan    In regards to his RCC follow-up in surveillance, Had discussed surveillance recommendation for T1 renal masses with annual contrasted CT, chest x-ray, BMP x3 years, however given the low recurrence rate of fully resected T1a renal masses, reasonable also for screening with ultrasound and repeating cross-sectional imaging is needed.  We had initially discussed contrasted CT at 1 year, and then ultrasound follow-up from there, but he was lost to follow-up.  He does have a renal ultrasound with no concerns on file with Nephrology of late.  In review of his new baseline CKD 3 a, will get cross-sectional imaging with MRI with contrast at this time.  If stable, can resume annual ultrasound.    He has minimal prostate enlargement moderate obstructive lower urinary tract symptoms and bothersome urinary quality of life.  He does have some obstructive predominant symptoms with hesitancy, weak stream, prolonged voiding time, and does have a mildly elevated postvoid residual especially for his age.  We did discuss BPH management strategies such as observation, medical management, and lower tract evaluation with cystoscopy and transrectal ultrasound to help guide further procedural or surgical intervention.  At this time, his elected to start medical therapy and Flomax 0.4 mg daily was prescribed after discussion of common side effects.  We did prescribe this in combination with conservative recommendations for urgency and frequency which were provided in writing.    His back pain does seem musculoskeletal in nature and he did have reproducible pain on exam but does start deeper different lower back pain.  I do not have any clinical concerns for prostatitis based on his exam were symptom profile, but will empirically give a course of doxycycline.     After a few months on medical therapy and here to conservative recommendations will have him  return for nurse visit for objective reassessment of his BPH/LUTS on medical therapy with uroflow/PVR/AUA symptom score.  On the same day he will get his MRI of the abdomen with labs, and will add on screening PSA at that time.    Will chart check all results and otherwise 1 year reminder has been set.  As well, as urinalysis dipstick is trace blood on exam today, was sent to the lab to get a urinalysis microscopic and rule in or out true lab defined micro hematuria.  Expect that is negative.    Total time spent in/on encounter today, including face to face time with patient, counseling, medical record review, interpretation of tests/results, , and treatment plan coordination: 45 minutes

## 2022-02-22 NOTE — PATIENT INSTRUCTIONS
Discussed conservative measures to control urgency and frequency including   1. Avoiding/minimizing bladder irritants (see below), especially in afternoon and evening hours    Discussed bladder irritants include coffe (even decaf), tea, alcohol, soda, spicy foods, acidic juices (orange, tomato), vinegar, and artificial sweeteners/sugary beverages.    2. timed voiding - empty on a schedule (approx ~2-3 hours) in spite of need to urinate, to get ahead of urge    3. dont postponing voiding - dont hold it on purpose     4. bowel regimen as distended bowel has extrinsic compressive effect on bladder.   - any or all of the following in any combination, titrate to soft daily bowel movement without pushing or straining  - colace/stool softener capsule - once to twice daily  - miralax - 1 capful daily to start, can increase to 2x daily (or decrease to 1/2 cap daily)  - increase dietary fibery  - fiber supplements, such as metamucil  - prunes, prune juice    5. INCREASE water intake    6. Stop fluids 2 hours before bed, and urinate just before bed    7. Flomax 0.4mg nightly    8. 10d course of doxycyline now    9. Uroflow testing in office with symptom reassessment then over to lab/radiology in 2-3 mons

## 2022-02-24 ENCOUNTER — PATIENT MESSAGE (OUTPATIENT)
Dept: NEPHROLOGY | Facility: CLINIC | Age: 52
End: 2022-02-24
Payer: COMMERCIAL

## 2022-03-08 ENCOUNTER — CLINICAL SUPPORT (OUTPATIENT)
Dept: OTHER | Facility: CLINIC | Age: 52
End: 2022-03-08
Payer: COMMERCIAL

## 2022-03-08 ENCOUNTER — PATIENT MESSAGE (OUTPATIENT)
Dept: NEPHROLOGY | Facility: CLINIC | Age: 52
End: 2022-03-08
Payer: COMMERCIAL

## 2022-03-08 DIAGNOSIS — Z00.8 ENCOUNTER FOR OTHER GENERAL EXAMINATION: ICD-10-CM

## 2022-03-09 VITALS — HEIGHT: 73 IN | BODY MASS INDEX: 25.46 KG/M2

## 2022-03-09 LAB
HDLC SERPL-MCNC: 50 MG/DL
POC CHOLESTEROL, LDL (DOCK): 78.4 MG/DL
POC CHOLESTEROL, TOTAL: 145 MG/DL
POC GLUCOSE, FASTING: 103 MG/DL (ref 60–110)
TRIGL SERPL-MCNC: 83 MG/DL

## 2022-04-26 ENCOUNTER — CLINICAL SUPPORT (OUTPATIENT)
Dept: UROLOGY | Facility: CLINIC | Age: 52
End: 2022-04-26
Payer: COMMERCIAL

## 2022-04-26 ENCOUNTER — HOSPITAL ENCOUNTER (OUTPATIENT)
Dept: RADIOLOGY | Facility: HOSPITAL | Age: 52
Discharge: HOME | End: 2022-04-26
Attending: UROLOGY
Payer: COMMERCIAL

## 2022-04-26 DIAGNOSIS — C64.1 RENAL CELL CARCINOMA OF RIGHT KIDNEY: ICD-10-CM

## 2022-04-26 DIAGNOSIS — N40.0 BPH WITHOUT URINARY OBSTRUCTION: Primary | ICD-10-CM

## 2022-04-26 LAB — POC RESIDUAL URINE VOLUME: 103 ML (ref 0–100)

## 2022-04-26 PROCEDURE — 99499 UNLISTED E&M SERVICE: CPT | Mod: S$GLB,,, | Performed by: UROLOGY

## 2022-04-26 PROCEDURE — 25500020 PHARM REV CODE 255: Performed by: UROLOGY

## 2022-04-26 PROCEDURE — 74183 MRI ABDOMEN W WO CONTRAST: ICD-10-PCS | Mod: 26,,, | Performed by: RADIOLOGY

## 2022-04-26 PROCEDURE — 99499 NO LOS: ICD-10-PCS | Mod: S$GLB,,, | Performed by: UROLOGY

## 2022-04-26 PROCEDURE — 51741 ELECTRO-UROFLOWMETRY FIRST: CPT | Mod: S$GLB,,, | Performed by: UROLOGY

## 2022-04-26 PROCEDURE — A9585 GADOBUTROL INJECTION: HCPCS | Performed by: UROLOGY

## 2022-04-26 PROCEDURE — 51798 US URINE CAPACITY MEASURE: CPT | Mod: S$GLB,,, | Performed by: UROLOGY

## 2022-04-26 PROCEDURE — 99999 PR PBB SHADOW E&M-EST. PATIENT-LVL I: ICD-10-PCS | Mod: PBBFAC,,,

## 2022-04-26 PROCEDURE — 51798 POCT BLADDER SCAN: ICD-10-PCS | Mod: S$GLB,,, | Performed by: UROLOGY

## 2022-04-26 PROCEDURE — 99999 PR PBB SHADOW E&M-EST. PATIENT-LVL I: CPT | Mod: PBBFAC,,,

## 2022-04-26 PROCEDURE — 51741 PR UROFLOWMETRY, COMPLEX: ICD-10-PCS | Mod: S$GLB,,, | Performed by: UROLOGY

## 2022-04-26 PROCEDURE — 74183 MRI ABD W/O CNTR FLWD CNTR: CPT | Mod: TC

## 2022-04-26 PROCEDURE — 74183 MRI ABD W/O CNTR FLWD CNTR: CPT | Mod: 26,,, | Performed by: RADIOLOGY

## 2022-04-26 RX ORDER — GADOBUTROL 604.72 MG/ML
8 INJECTION INTRAVENOUS
Status: COMPLETED | OUTPATIENT
Start: 2022-04-26 | End: 2022-04-26

## 2022-04-26 RX ADMIN — GADOBUTROL 4 ML: 604.72 INJECTION INTRAVENOUS at 11:04

## 2022-04-26 NOTE — PROGRESS NOTES
AUASS:  Incomplete emptying- 2  Frequency- 3  Intermittency- 1  Urgency- 1  Weak Stream- 1  Straining- 1  Sleeping- 2  Total-  11  QOL- 3  PVR- 103 ml    Uroflow results   Voiding time:88.0 s,   Flow time:77.4 s,   TTP flow:35.8 s,   Peak flowrate: 6.1 mL/s,   Average flowrate: 3.3 mL/s,   Intervals:3 ,   Voided volume:254  mL,   Pattern of curve: to be determined by physician.

## 2022-04-27 LAB
CREAT SERPL-MCNC: 1.6 MG/DL (ref 0.5–1.4)
SAMPLE: ABNORMAL

## 2022-06-30 ENCOUNTER — TELEPHONE (OUTPATIENT)
Dept: UROLOGY | Facility: CLINIC | Age: 52
End: 2022-06-30
Payer: COMMERCIAL

## 2022-06-30 DIAGNOSIS — N13.8 BPH WITH OBSTRUCTION/LOWER URINARY TRACT SYMPTOMS: Primary | ICD-10-CM

## 2022-06-30 DIAGNOSIS — N40.1 BPH WITH OBSTRUCTION/LOWER URINARY TRACT SYMPTOMS: Primary | ICD-10-CM

## 2022-06-30 NOTE — TELEPHONE ENCOUNTER
Spoke w pt and informed of md apology of review of nurse visit   Pt informed of md review and recs  Pt was explained in detail of cysto procedure along with trus  Pt chose date 8/9   Pt declined vaccination of covid pt was scheduled and informed neded 3 days before.  Pt vu

## 2022-06-30 NOTE — TELEPHONE ENCOUNTER
Please apologize for delay review of his nurse visit urine flow testing.    Previously released to portal noting MRI was negative for recurrence, PSA normal, kidney function stable.    However on review of urine flow testing symptom score and PVR, he does have significantly obstructed urine flow pattern despite taking Flomax 0.4 mg daily, with minimal improvement in symptoms, and significant incomplete emptying with pvr 108cc    At last o/v we did discuss lower tract evaluation with cystoscopy and transrectal ultrasound to help guide further procedural or surgical intervention.  At that time, he elected to start medical therapy and Flomax 0.4 mg daily was prescribed and elected to return for nurse visit reeval with uroflow as above    -->-->   given minimal symptomatic improvement, significant obstruction on uroflow, and incomplete bladder emptying despite Flomax, I do recommend proceeding with lower tract evaluation with cystoscopy and transrectal ultrasound at the ASC.    Can review these procedures again with him as we did discuss at last office visit, and choose any Tuesday afternoon in August to put on ASC schedule.  Case request place with no date, notify ASC to pull from [facility].  - check if precovid needed or just needs to update vax status in chart

## 2022-06-30 NOTE — PROGRESS NOTES
Pattern of curve reviewed with parameters as documented in nursing note from 4/26/22.  Does not generate true voiding pressure, with maximum flow rate of such 6 cc/second were as average flow less than 15 cc/second is obstructed.  Initial voiding curve is flat, low peaking, intermittent and plateaued, and there are multiple intervals to empty, of which he does keep residual.     Given significantly obstructed urine flow pattern despite taking Flomax 0.4 mg daily, with minimal improvement in symptoms, and significant incomplete emptying with pvr 108cc, will recommend further lower tract evaluation with cystoscopy and prostate ultrasound as previously discussed

## 2022-08-02 NOTE — DISCHARGE INSTRUCTIONS
Before leaving, please make sure you have all your personal belongings such as glasses, purses, wallets, keys, cell phones, jewelry, jackets etc           After the procedure    Drink plenty of fluids.  You may have burning or light bleeding when you urinate--this is normal.  Medications may be prescribed to ease any discomfort or prevent infection. Take these as directed.  Call your doctor if you have heavy bleeding or blood clots, burning that lasts more than a day, a fever over 100°F  (38° C), or trouble urinating.    After Surgery:  Always be aware that any surgery can cause these symptoms:    Pain- Medication can be prescribed for pain to decrease your pain but may not completely take your pain away.  Over the Counter pain medicine my be enough and you can always use Ice and rest to help ease pain.    Bleeding- a little bleeding after a surgery is usually within normal.  If there is a lot of blood you need to notify your MD.  Emergency treatments of bleeding are cold application, elevation of the bleeding site and compression.    Infection- Infection after surgery is NOT a normal occurrence.  Signs of infection are fever, swelling, hot to touch the incision.  If this occurs notify your MD immediately.    Nausea- this can be common after a surgery especially if you have had anesthesia medicine or are taking pain medicine.  Staying on clear liquids, bland foods, gingerale, or over the counter anti nausea medicines can help.  If you vomit more than once, notify your MD.  Anti Nausea medicines can be prescribed.         Transrectal Ultrasound   A transrectal ultrasound is an imaging test. It uses sound waves to create pictures of a mans prostate gland to determine its size.Your prostate gland is in front of your rectum and if too large may become inflamed and impede the flow of urine. For this test, a special probe (transducer) is placed directly into your rectum.     Before leaving, you may need to wait for a  short time while the images are reviewed. In most cases, you can go back to your normal routine after the test.

## 2022-08-06 ENCOUNTER — LAB VISIT (OUTPATIENT)
Dept: PRIMARY CARE CLINIC | Facility: CLINIC | Age: 52
End: 2022-08-06
Payer: COMMERCIAL

## 2022-08-06 DIAGNOSIS — N13.8 BPH WITH OBSTRUCTION/LOWER URINARY TRACT SYMPTOMS: ICD-10-CM

## 2022-08-06 DIAGNOSIS — N40.1 BPH WITH OBSTRUCTION/LOWER URINARY TRACT SYMPTOMS: ICD-10-CM

## 2022-08-06 PROCEDURE — U0005 INFEC AGEN DETEC AMPLI PROBE: HCPCS | Performed by: UROLOGY

## 2022-08-06 PROCEDURE — U0003 INFECTIOUS AGENT DETECTION BY NUCLEIC ACID (DNA OR RNA); SEVERE ACUTE RESPIRATORY SYNDROME CORONAVIRUS 2 (SARS-COV-2) (CORONAVIRUS DISEASE [COVID-19]), AMPLIFIED PROBE TECHNIQUE, MAKING USE OF HIGH THROUGHPUT TECHNOLOGIES AS DESCRIBED BY CMS-2020-01-R: HCPCS | Performed by: UROLOGY

## 2022-08-07 LAB — SARS-COV-2 RNA RESP QL NAA+PROBE: NOT DETECTED

## 2022-08-09 ENCOUNTER — HOSPITAL ENCOUNTER (OUTPATIENT)
Facility: AMBULARY SURGERY CENTER | Age: 52
Discharge: HOME | End: 2022-08-09
Attending: UROLOGY | Admitting: UROLOGY
Payer: COMMERCIAL

## 2022-08-09 DIAGNOSIS — N40.1 BPH WITH OBSTRUCTION/LOWER URINARY TRACT SYMPTOMS: ICD-10-CM

## 2022-08-09 DIAGNOSIS — N13.8 BPH WITH OBSTRUCTION/LOWER URINARY TRACT SYMPTOMS: ICD-10-CM

## 2022-08-09 PROCEDURE — 76872 US TRANSRECTAL: CPT | Mod: 26,,, | Performed by: UROLOGY

## 2022-08-09 PROCEDURE — 52000 PR CYSTOURETHROSCOPY: ICD-10-PCS | Mod: ,,, | Performed by: UROLOGY

## 2022-08-09 PROCEDURE — 76872 US TRANSRECTAL: CPT | Performed by: UROLOGY

## 2022-08-09 PROCEDURE — 76872 PR US TRANSRECTAL: ICD-10-PCS | Mod: 26,,, | Performed by: UROLOGY

## 2022-08-09 PROCEDURE — 52000 CYSTOURETHROSCOPY: CPT | Performed by: UROLOGY

## 2022-08-09 PROCEDURE — 52000 CYSTOURETHROSCOPY: CPT | Mod: ,,, | Performed by: UROLOGY

## 2022-08-09 RX ORDER — WATER 1 ML/ML
IRRIGANT IRRIGATION
Status: DISCONTINUED | OUTPATIENT
Start: 2022-08-09 | End: 2022-08-09 | Stop reason: HOSPADM

## 2022-08-09 RX ORDER — CIPROFLOXACIN 500 MG/1
500 TABLET ORAL 2 TIMES DAILY
Qty: 4 TABLET | Refills: 0 | Status: SHIPPED | OUTPATIENT
Start: 2022-08-09 | End: 2022-08-25

## 2022-08-09 RX ORDER — LIDOCAINE HYDROCHLORIDE 20 MG/ML
JELLY TOPICAL
Status: DISCONTINUED | OUTPATIENT
Start: 2022-08-09 | End: 2022-08-09 | Stop reason: HOSPADM

## 2022-08-09 NOTE — H&P
[facility] Urology Progress Note     Valentin Johnson is a 51 y.o. male who presents for urologic follow up, with history of RCC and concerns about his voiding habits     He was incidentally found to have approximate 3cm mid to lower pole endophytic central solid enhancing right renal mass concerning for malignancy. After extensive discussion and imaging review, pt elected to proceed with robot-assisted laparascopic partial vs radical nephrectomy.     9/2/20 - right robotic assisted lap radical nephrectomy, after intraop U/S localization of central endophytic renal mass revealed no margin for partial resection  Did well ans DCed home on POD 1 with only mild expected postop renal function decline. Preop Cr normal, and healthy without comorbidities so when postop Cr 1.3/eGFR >60, utilized only 2 doses of toradol Q8 rather than Q6 for narcotic sparing protocol, with Cr up to 1.7 in AM so DCed, and settled at 1.6 at discharge afternoon POD1.  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   His preop creatinine was 1-1.2, and his baseline has subtle out at 1.4-1.5, most recently 1.5 with EGFR of 53.5.  Continues to follow with Nephrology.  Last PSA on file is 0.89.  Presents today concerned that he is urinating all the time.  He also is having cramping in his back which radiates to the front and bilateral thighs.  He was having initial concerns postoperatively of pain on the right side or kidney used to be, but of late has been having concerns of left-sided pain on the side of his solitary kidney and strained pain which has been attributed to diverticulosis and IBS.   "But now he is feeling pain around the back to the left side.  Has baseline back in musculoskeletal pain but feels that this is much deeper.  He also notes to be urinating all the time.  AUA symptom score:  17/3 (3:  Emptying, frequency; 3:  Urgency, weak stream; 2:  Straining, sleeping; 1:  Intermittency.  He does have some urinary hesitancy with prolonged stream and flow  Postvoid residuals 84 cc by BladderScan.  Urinalysis dipstick shows trace blood.  He also still has some bright red blood per rectum with hemorrhaging bleeding occasionally.  But denies constipation.  Father had prostate cancer.        Focused Physical Exam:         Vitals:     02/22/22 1108   BP: 127/86   Pulse: 82   Resp: 18      Body mass index is 25.46 kg/m². Weight: 87.5 kg (193 lb) Height: 6' 1" (185.4 cm)      Abdomen: Soft, non-tender, nondistended, no CVA tenderness  :  circ normal phallus without plaques/lesions, orthotopic urethral meatus, bilaterally desc testes in normal scrotum without mass or lesion  Back:  Reproducible tenderness to palpation left lower back  NGUYEN: normal sphincter tone, no masses, no hemmorrhoids   PROSTATE: 25g, no nodules, non-tender, symmetrical.         Recent Results         Recent Results (from the past 336 hour(s))   POCT URINE DIPSTICK WITHOUT MICROSCOPE     Collection Time: 02/22/22 11:19 AM   Result Value Ref Range     Color, UA Yellow       pH, UA 7       WBC, UA neg       Nitrite, UA neg       Protein, POC neg       Glucose, UA neg       Ketones, UA neg       Urobilinogen, UA 0.2       Bilirubin, POC neg       Blood, UA trace       Clarity, UA Clear       Spec Grav UA 1.010     POCT Bladder Scan     Collection Time: 02/22/22 11:20 AM   Result Value Ref Range     POC Residual Urine Volume 84 0 - 100 mL            ASSESSMENT   1. Renal cell carcinoma of right kidney  POCT Bladder Scan     POCT URINE DIPSTICK WITHOUT MICROSCOPE     Creatinine, Serum     MRI Abdomen W WO Contrast   2. BPH with " obstruction/lower urinary tract symptoms  Uroflowmetry   3. Prostate cancer screening  PSA, Screening   4. Microhematuria  Urinalysis Microscopic         Plan    In regards to his RCC follow-up in surveillance, Had discussed surveillance recommendation for T1 renal masses with annual contrasted CT, chest x-ray, BMP x3 years, however given the low recurrence rate of fully resected T1a renal masses, reasonable also for screening with ultrasound and repeating cross-sectional imaging is needed.  We had initially discussed contrasted CT at 1 year, and then ultrasound follow-up from there, but he was lost to follow-up.  He does have a renal ultrasound with no concerns on file with Nephrology of late.  In review of his new baseline CKD 3 a, will get cross-sectional imaging with MRI with contrast at this time.  If stable, can resume annual ultrasound.     He has minimal prostate enlargement moderate obstructive lower urinary tract symptoms and bothersome urinary quality of life.  He does have some obstructive predominant symptoms with hesitancy, weak stream, prolonged voiding time, and does have a mildly elevated postvoid residual especially for his age.  We did discuss BPH management strategies such as observation, medical management, and lower tract evaluation with cystoscopy and transrectal ultrasound to help guide further procedural or surgical intervention.  At this time, his elected to start medical therapy and Flomax 0.4 mg daily was prescribed after discussion of common side effects.  We did prescribe this in combination with conservative recommendations for urgency and frequency which were provided in writing.     His back pain does seem musculoskeletal in nature and he did have reproducible pain on exam but does start deeper different lower back pain.  I do not have any clinical concerns for prostatitis based on his exam were symptom profile, but will empirically give a course of doxycycline.      After a few  months on medical therapy and here to conservative recommendations will have him return for nurse visit for objective reassessment of his BPH/LUTS on medical therapy with uroflow/PVR/AUA symptom score.  On the same day he will get his MRI of the abdomen with labs, and will add on screening PSA at that time.     Will chart check all results and otherwise 1 year reminder has been set.  As well, as urinalysis dipstick is trace blood on exam today, was sent to the lab to get a urinalysis microscopic and rule in or out true lab defined micro hematuria.  Expect that is negative.      -  Pattern of curve reviewed with parameters as documented in nursing note from 4/26/22.  Does not generate true voiding pressure, with maximum flow rate of such 6 cc/second were as average flow less than 15 cc/second is obstructed.  Initial voiding curve is flat, low peaking, intermittent and plateaued, and there are multiple intervals to empty, of which he does keep residual.      Given significantly obstructed urine flow pattern despite taking Flomax 0.4 mg daily, with minimal improvement in symptoms, and significant incomplete emptying with pvr 108cc, will recommend further lower tract evaluation with cystoscopy and prostate ultrasound as previously discussed    Here for cysto/trus  Acceptable for asc

## 2022-08-11 VITALS
TEMPERATURE: 99 F | DIASTOLIC BLOOD PRESSURE: 84 MMHG | SYSTOLIC BLOOD PRESSURE: 131 MMHG | WEIGHT: 192.88 LBS | RESPIRATION RATE: 16 BRPM | OXYGEN SATURATION: 99 % | BODY MASS INDEX: 25.45 KG/M2 | HEART RATE: 55 BPM

## 2022-08-13 DIAGNOSIS — N39.8 DYSFUNCTIONAL VOIDING OF URINE: ICD-10-CM

## 2022-08-13 DIAGNOSIS — M62.89 PELVIC FLOOR TENSION: Primary | ICD-10-CM

## 2022-08-13 NOTE — OP NOTE
[facility] Urology Op Note/Brief DC Summary     Date: 8/9/22     Staff Surgeon: Corby Gomez MD     Pre-Op Diagnosis:   BPH with LUTS     Post-Op Diagnosis: same     Procedure(s) Performed:   Cystoscopy, flexible  Transrectal ultrasound guided volumetric measurement of prostate     Specimen(s): none     Anesthesia: local, 2% xylocaine jelly urojet     Findings:   volume 32.8cm3 (W 47.9 mm, H 26.5 mm, L 49.6 mm), mild PVR, no median lobe  Cystoscopy: significant elevation of bladder neck with funnel-like appearance of proximal prostatic urethra to bladder neck noting significant slope up of scope over floor of prostate and bladder neck, only with some anterolateral lobe mildly obstructing ingrowth into channel with water off at point of contact of raised floor of prostate/bladder neck meeting anterolateral ingrowth of bilateral lateral lobes. Mildly trabeculated bladder.      Estimated Blood Loss: none     Drains: none     Complications: none     Indications for procedure:  52 yo M w hx RCC who recently presented complaining of urinary frequency and moderate LUTS AUA SS 17/3 with pvr 84cc and despite alpha blocker had minimal improvement in symptoms with obstructed uroflow (flat, low peaking, intermittent and plateaued, and there are multiple intervals) and PVR 108cc. Presents today for lower tract evaluation for obstruction.     Procedure in detail:  After informed consent, the patient was placed in left lateral decubitus position. Transrectal ultrasound probe was passed into the rectum and the prostate was visualized on the screen.  Three-dimensional measurements taken as above with a total prostate volume of 32.8g.  On sagittal view there is no median lobe, and mild post void residal.  No ultrasonic abnormalities of prostate visualized. Transverse and saggital image captured.  The ultrasound probe was removed     He was then placed in supine position and prepped and drapped in standard cystoscopic fashion and 2%  xylocaine jelly was instilled into the urethra     A flexible cystoscope was then passed into the bladder via the urethra.  Anterior urethra appeared normal without any lesions or narrowings. The prostatic urethra demonstrated findings above. Not distinctly obstructing centrally from lateral lobe contact, but rather the elevation of the bladder neck and floor of prostatic urethra meeting the anterolateral ingrowth of the lateral lobes in a tri-fold type obstruction, but only with water off, as with flow on just significant elevation of bladder neck with funneling effect.      Bladder otherwise systematically inspected and no mucosal lesions or tumors seen. He did have some mild diffuse trabeculations. Bilateral ureteral orifices seen in orthotopic position on trigone bilaterally with clear efflux.     On retroflexion, no median lobe or other abnormalities     Patient tolerated the procedure well. No complications     Disposition:  Though he does not have gross obstruction from his bilateral lateral lobe ingrowth, the anatomy of his prostate with his high bladder neck and elevation of floor of the prostate leading to some bladder neck distortion as well as mild Tri fold pattern obstruction from anterolateral lobe ingrowth     Reviewed all BPH procedures and interventions, noting that with his anatomy, a transurethral incision of the prostate would be most reasonable given the anatomic considerations, balance side effect and bladder neck.  Reviewed this procedure in detail loss risks and benefits.  Prior to proceeding with surgical intervention, given his uroflow pattern, significant musculoskeletal pain intention, IBS, anxiety component, we did discuss that there may be an element of pelvic floor dysfunction and dysfunctional voiding.  There is increased tone and anatomic distortion of bladder neck, and may be an element of pelvic floor tension, and therefore advise that he have a pelvic floor evaluation by pelvic  floor physical therapy.  If pelvic floor PT identifies areas that can be improved conservatively, may trial this prior to proceeding with surgical intervention.  Referral placed.  Will follow-up evaluation and recommendations, and if no significant benefit found, will proceed with TUIP    Discharge:  Disposition:  Home today status post uncomplicated procedure  Diet: home  F/U: PFPT eval, possible TUIP  Instructions:  Drink plenty of water, may see blood in urine, complete 2 days of prophylactic antibiotics  Meds:     Medication List      START taking these medications    ciprofloxacin HCl 500 MG tablet  Commonly known as: CIPRO  Take 1 tablet (500 mg total) by mouth 2 (two) times daily.        CONTINUE taking these medications    ascorbic acid (vitamin C) 100 MG tablet  Commonly known as: VITAMIN C     multivitamin per tablet  Commonly known as: THERAGRAN     mupirocin 2 % ointment  Commonly known as: BACTROBAN  Apply ointment by Nasal route , both nostrils, 2 times per day , for 10 days     tamsulosin 0.4 mg Cap  Commonly known as: FLOMAX  Take 1 capsule (0.4 mg total) by mouth every evening.     vitamin D 1000 units Tab  Commonly known as: VITAMIN D3     VUITY 1.25 % Drop  Generic drug: pilocarpine HCl  Apply 1 drop to eye daily as needed.     zinc 50 mg Tab           Where to Get Your Medications      These medications were sent to [facility]  [address], [place] [address]    Hours: Mon-Fri, 8a-5:30p Phone: [phone]   · ciprofloxacin HCl 500 MG tablet

## 2022-08-16 ENCOUNTER — CLINICAL SUPPORT (OUTPATIENT)
Dept: REHABILITATION | Facility: HOSPITAL | Age: 52
End: 2022-08-16
Attending: UROLOGY
Payer: COMMERCIAL

## 2022-08-16 DIAGNOSIS — M62.89 HIGH-TONE PELVIC FLOOR DYSFUNCTION: ICD-10-CM

## 2022-08-16 DIAGNOSIS — M62.89 PELVIC FLOOR TENSION: ICD-10-CM

## 2022-08-16 DIAGNOSIS — N39.8 DYSFUNCTIONAL VOIDING OF URINE: ICD-10-CM

## 2022-08-16 PROCEDURE — 97162 PT EVAL MOD COMPLEX 30 MIN: CPT | Mod: PN

## 2022-08-16 PROCEDURE — 97112 NEUROMUSCULAR REEDUCATION: CPT | Mod: PN

## 2022-08-16 NOTE — PATIENT INSTRUCTIONS
"Home Exercise Program: 8/16/2022    DIAPHRAGMATIC BREATHING     The diaphragm is a dome shaped muscle that forms the floor of the rib cage. It is the most efficient muscle for breathing and relaxation, although most people are not used to using the diaphragm. Diaphragmatic or belly breathing is an important technique to learn because it helps settle down or relax the autonomic nervous system. The correct use of diaphragmatic breathing can help to quiet brain activity resulting in the relaxation of all the muscles and organs of the body. This is accomplished by slow rhythmic breathing concentrated in the diaphragm muscle rather than the chest.    How to do proper relaxation breathing:    Start by lying on your back or reclining in a chair in a relaxed position. Place one hand on your chest and the other on your abdomen.  Relax your jaw by placing your tongue on the floor of your mouth and keeping your teeth slightly apart.   Take a deep breath in, letting the abdomen expand and rise while you keep your upper chest, neck and shoulders relaxed.   As you breathe out, allow your abdomen and chest to fall. Exhale completely.  It doesn't matter if you breathe in/out through your nose and/or mouth. Do whichever feels comfortable.  Remember to breathe slowly.  Do not force your breathing. Do not hold your breath.  Repeat while doing stretches listed below:        "single knee to chest" - lay on your back, use your hands to pull your left knee to your chest, keeping the right leg straight on the bed. Hold this pose while you incorporate your diaphragmatic breathing. Repeat on the opposite side. Complete 3 sets of 10 breaths on both legs.         Butterfly stretch - lay on your back, knees bent and feet together. Let your knees open so they drop down towards the table. Think about relaxing in this position; perform belly breathing. Complete 3 sets of 10 breaths        "Happy Baby" - lay on your back. Open your knees slightly " "wider than your torso, then bring them up toward your armpits. Position each ankle directly over the knee, so your shins are perpendicular to the floor. Flex through the heels. Hold this pose while you incorporate your deep breathing. Complete 3 sets of 10 breaths.    HAPPY BABY MODIFICATION:    "Modified Happy Baby" - lay on your back, use your hands to pull your knees to your chest, then bring them out wide (about even with your shoulders).         "Child's Pose" - first start by getting onto your hands and knees, then move your feet so they are touching and your knees are wider than your hips. Sit back so that you are sitting on your heels and reach your arms forward. Think about resting in this position. Complete 3 sets of 10 breaths.      CHILD'S POSE VARIATIONS:             HOW DIET MAY AFFECT YOUR BLADDER    Although there is no particular "diet" that can cure bladder control, there are certain dietary suggestions you can use to help control the problem.  Many people with bladder control problems decrease their intake of liquids in hope that they will need to urinate less frequently or have less urinary leakage.  While a decrease in liquid intake does result in a decrease in the volume of urine, the smaller amount of urine may be more highly concentrated.  Highly concentrated, dark yellow urine is irritating to the bladder surface and may actually cause you to go to the bathroom more frequently.  It also encourages the growth of bacteria, which may lead to infections resulting in incontinence.  You should not restrict fluids to control your bladder.    Some foods and beverages are thought to contribute to bladder leakage and irritability. However their effect on the bladder is not completely understood and you may want to see if eliminating one or all of these items improves your bladder control.  If you are unable to give them up completely, it is recommended that you use the following items in " moderation:    Foods with acidic properties:  Alcoholic beverages  Tomato based products  Vinegar  Coffee (regular and decaf)  Tea (regular and decaf)  Kilpatrick  Citrus fruits and juices  Spicy foods  Caffeinated beverages  Cola  Milk  Food colorings and flavorings  Artificial sweeteners  Chocolate    Cigarette smoking is also irritating to the bladder surface and is associated with bladder cancer.  In addition, the coughing associated with smoking may lead to increased incontinent episodes.          Substitutions for Bladder Irritants  Although water is always the best beverage choice, grape and apple juice are thirst quenchers and are not as irritating to the bladder.    Low acid fruits:  pears, apricots, papaya, watermelon    For coffee drinkers: KAVA®  Postum®  Danii®  Kaffree Taylor®    For tea drinkers: Non-citrus herbal    Sun brewed tea    Vitamin C substitute:  Calcium carbonate co-buffered with calcium ascorbate    Good Habits  Maintain a good fluid intake. Depending on your body size and environment, drink 4-8 cups (8 oz each) of fluid per day unless otherwise advised by your doctor. Not enough fluid creates a foul odor and dark color of the urine.  Typical dietary recommendations for fiber are between 25-35 grams per day. You should discuss your fiber needs with your physician, pharmacist or nutritionist.

## 2022-08-17 PROBLEM — N39.8 DYSFUNCTIONAL VOIDING OF URINE: Status: ACTIVE | Noted: 2022-08-17

## 2022-08-17 PROBLEM — M62.89 HIGH-TONE PELVIC FLOOR DYSFUNCTION: Status: ACTIVE | Noted: 2022-08-17

## 2022-08-17 NOTE — PLAN OF CARE
"[facility]  Pelvic Health Physical Therapy Initial Evaluation    Date: 8/16/2022   Name: Valentin Johnson  Clinic Number: [id number]  Therapy Diagnosis:   Encounter Diagnoses   Name Primary?    Pelvic floor tension     Dysfunctional voiding of urine     High-tone pelvic floor dysfunction      Physician: Corby Gomez MD    Physician Orders: PT Eval and Treat   Medical Diagnosis from Referral: Pelvic floor tension [M62.89], Dysfunctional voiding of urine [N39.8]  Evaluation Date: 8/16/2022  Authorization Period Expiration: 8/13/2023  Plan of Care Expiration: 12/16/2022  Visit # / Visits authorized: 1/ 1    Time In: 2:30  Time Out: 3:30  Total Appointment Time (timed & untimed codes): 60 minutes    Precautions: universal    Subjective     Date of onset: during COVID     History of current condition - Jan reports: during COVID he was diagnosed with cancer and had to have a kidney removed. He's had issues with his urine stream being weaker on and off in the past. Patient states he drives a lot, got in the habit of having a certain place to stop and urinate and then he feels like he's about to burst when he gets there because he was stuck in traffic.   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 He was also told that his bladder had a "slant" and he may need an operation.   His wife is in [place], drives back and forth. He has a kid finishing high school here. Has another son that works for judo     Sexually active? Yes  Pain with intercourse? No, but he has noticed when he is with his wife if they have intercourse in the morning he is unable to maintain an erection later in the day if they attempt to have intercourse again.     Pain:  Hadn't had any pelvic pain until the last one, started having occasional discomfort in groin area.   For the last 10-12 years he'll get " "pain on left side, worse with stress. Has beginning of diverticulitis but nothing serious right now, controls with diet.     Bladder/Bowel History:    Frequency of urination:   Daytime: depends on fluid intake - if he's at home and is drinking more fluids he'll go 20+ times/day. When he's at work in [place] he'll limits fluids so he doesn't have to pee as much.            Nighttime: at least once/night, sometimes 3    Difficulty initiating urine stream: Yes, if he's held it for a while there will be a pause before he can initiate a urine stream despite feeling like "he's about to burst"    Urine stream: variable   Complete emptying: No, there have been times where he'll finish urinating, go to brush his teeth, and then it feels like he has to pee again. Confirmed on US at urologist office.    Bladder leakage: No   Frequency of incidents: not applicable    Urinary Urgency: Yes, if he's been holding it for a while (in traffic, on a call, etc)    Frequency of bowel movements: 1-2 times a day   Difficulty initiating BM: No   Does Patient Feel Empty after BM? Yes   Fiber Supplements or Laxative Use? No   Colon leakage: No       Medical History: Jan  has a past medical history of Colon polyp, GERD (gastroesophageal reflux disease), Giardial enteritis, H. pylori infection (2015), and Prostatitis (2016).     Surgical History: Valentin Johnson  has a past surgical history that includes Nasal septum surgery; Upper gastrointestinal endoscopy; Colonoscopy (N/A, 12/16/2015); Circumcision; Colonoscopy (N/A, 8/14/2020); Robot-assisted laparoscopic nephrectomy (Right, 9/2/2020); Hernia repair (Right, 1990); Examination under anesthesia (N/A, 1/18/2021); Rectal biopsy (N/A, 1/18/2021); Transrectal ultrasound examination (N/A, 8/9/2022); and Cystoscopy (N/A, 8/9/2022).    Medications: Valentin has a current medication list which includes the following prescription(s): ascorbic acid (vitamin c), ciprofloxacin hcl, " multivitamin, mupirocin, vuity, tamsulosin, vitamin d, and zinc.    Allergies:   Review of patient's allergies indicates:   Allergen Reactions    Penicillins      Childhood allergy. Reaction unknown.        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   Prior Level of Function: independent   Current Level of Function: see above     Types of fluid intake: water: 32 oz cup that he'll fill 5-6 times/day, 1 cup coffee in AM - will make it feel more urgent in the mornings if he has to drive anywhere for work; he'll urinate at least 5 times in 2 hrs in the morning if he's at home.      Pts goals: resolve urinary dysfunction/sexual dysfunction     OBJECTIVE   2    ORTHO SCREEN  Posture in sitting: WNL  Posture in standing: WNL  Pelvic alignment: Not assessed today        BREATHING MECHANICS ASSESSMENT   Thorax Assessment During Quiet Respiration: WNL excursion of ribcage  Thorax Assessment During Deep Respiration: WNL excursion of ribcage and WNL excursion of abdominal wall      RECTAL PELVIC FLOOR EXAM - deferred today      TREATMENT     Treatment Time In: 3:00  Treatment Time Out: 3:30  Total Treatment time (time-based codes) separate from Evaluation: 30 minutes      Neuromuscular Re-education to develop Coordination, Control and Down training for 30 minutes including:   diaphragmatic breathing and stretches for down-training       Patient Education provided:   general anatomy/physiology of urinary/ bowel  system  and benefits of treatment were discussed with the pt. Additionally, bladder irritants, anatomy/physiology of pelvic floor, diaphragmatic breathing, double voiding techniques, fluid intake/dietary modifications and behavior modifications were reviewed.     Home Exercises provided:  Written Home Exercises provided: yes.  Exercises were reviewed and Jan was able to demonstrate them prior to the end of the session.    Jan demonstrated good  understanding of the education provided.     See EMR under Patient Instructions for exercises provided 8/16/2022.    Assessment     Valentin is a 51 y.o. male referred to outpatient Physical Therapy with a medical diagnosis of Pelvic floor tension [M62.89], Dysfunctional voiding of urine [N39.8]  . Pt presents with decreased pelvic muscle strength, decreased phasic ability of the pelvic muscles, increased tension of the pelvic muscles, poor quality of pelvic muscle contraction, increased frequency of urination, increased nocturia, dysfunctional voiding, unable to co-contract or co-relax abdominal wall and pelvic floor muscles and decreased knowledge of bladder irritants . Patient presents with signs and symptoms consistent with voiding and sexual dysfunction due to hypertonicity of pelvic floor muscles. Due to patient's schedule, he wishes to obtain home exercise program that he can perform independently. Discussed likely rationale for his symptoms and prescribed program to address resting tone of pelvic floor muscles. Discussed scheduling an appointment to follow up in a couple months, if patient feels his symptoms have resolved, he can call to cancel prior.      Pt prognosis is Excellent.   Pt will benefit from skilled outpatient Physical Therapy to address the deficits stated above and in the chart below, provide pt/family education, and to maximize pt's level of independence.     Plan of care discussed with patient: Yes  Pt's spiritual, cultural and educational needs  considered and patient is agreeable to the plan of care and goals as stated below:     Anticipated Barriers for therapy: commute     Medical Necessity is demonstrated by the following:    History  Co-morbidities and personal factors that may impact the plan of care Co-morbidities    has a past medical history of Colon polyp, GERD (gastroesophageal reflux disease), Giardial enteritis, H. pylori infection (2015), and Prostatitis (2016).   has a past surgical history that includes Nasal septum surgery; Upper gastrointestinal endoscopy; Colonoscopy (N/A, 12/16/2015); Circumcision; Colonoscopy (N/A, 8/14/2020); Robot-assisted laparoscopic nephrectomy (Right, 9/2/2020); Hernia repair (Right, 1990); Examination under anesthesia (N/A, 1/18/2021); Rectal biopsy (N/A, 1/18/2021); Transrectal ultrasound examination (N/A, 8/9/2022); and Cystoscopy (N/A, 8/9/2022).      Personal Factors  lifestyle     moderate   Examination  Body structures and functions, activity limitations and participation restrictions that may impact the plan of care Body Regions/Systems/Functions:   decreased pelvic muscle strength, decreased phasic ability of the pelvic muscles, increased tension of the pelvic muscles, poor quality of pelvic muscle contraction, increased frequency of urination, increased nocturia, dysfunctional voiding, unable to co-contract or co-relax abdominal wall and pelvic floor muscles and decreased knowledge of bladder irritants .    Activity Limitations:  urgency , delaying urge to urinate, pain with full bladder affecting ADL participation and/or sleep, full bladder emptying, rectal exam without pain, sleep uninterrupted by excessive nocturia, difficulty getting an erection  and bathroom mapping    Participation Restrictions:  all ADLs/iADLs uninterrupted by urinary incontinence/urgency/frequency, ADLs affected by inability to fully empty bladder , social activities with friends/family, relationship with spouse/partner, work  duties, Sleep restrictions and difficulty starting urine stream or fully emptying bladder     Activity limitations:   Learning and applying knowledge  no deficits    General Tasks and Commands  no deficits    Communication  no deficits    Mobility  driving (bike, car, motorcycle)    Self care  toileting    Domestic Life  no deficits    Interactions/Relationships  relating with strangers  formal relationships  family relationships  intimate relationships    Life Areas  employment  basic economic transactions    Community and Social Life  community life  recreation and leisure       moderate   Clinical Presentation evolving clinical presentation with changing clinical characteristics moderate   Decision Making/ Complexity Score: moderate       Goals:  Short Term Goals: 6 weeks   - Pt will demonstrate excellent knowledge and adherence to HEP to facilitate optimal recovery.    Long Term Goals: 12-16 weeks   - Pt will demonstrate excellent knowledge and adherence to HEP for continued self-maintenance of symptoms.  - Pt will report voiding interval of 2-3 hours for improved ADL tolerance.  - Pt will report ability to delay urinary urge for at least 15 minutes to maintain continence with ADL/IADLs.   - Pt will report ability to fully empty bladder for improved tolerance with ADL/IADLs.   - Pt will be able to successfully maintain erection and complete sexual intercourse at least twice in one day for improved ADL tolerance.       Plan     Plan of care Certification: 8/16/2022 to 12/16/2022.    Outpatient Physical Therapy 1 times weekly for 16 weeks to include the following interventions: therapeutic exercises, therapeutic activity, neuromuscular re-education, manual therapy, modalities PRN, patient/family education, dry needling and self care/home management    Estelle Aviles, PT

## 2022-08-23 ENCOUNTER — CLINICAL SUPPORT (OUTPATIENT)
Dept: INTERNAL MEDICINE | Facility: CLINIC | Age: 52
End: 2022-08-23
Payer: COMMERCIAL

## 2022-08-23 DIAGNOSIS — Z00.00 ANNUAL PHYSICAL EXAM: Primary | ICD-10-CM

## 2022-08-23 LAB
25(OH)D3+25(OH)D2 SERPL-MCNC: 68 NG/ML (ref 30–96)
ALBUMIN SERPL BCP-MCNC: 4.8 G/DL (ref 3.5–5.2)
ALP SERPL-CCNC: 71 U/L (ref 55–135)
ALT SERPL W/O P-5'-P-CCNC: 19 U/L (ref 10–44)
ANION GAP SERPL CALC-SCNC: 12 MMOL/L (ref 8–16)
AST SERPL-CCNC: 18 U/L (ref 10–40)
BILIRUB SERPL-MCNC: 0.8 MG/DL (ref 0.1–1)
BUN SERPL-MCNC: 20 MG/DL (ref 6–20)
CALCIUM SERPL-MCNC: 9.7 MG/DL (ref 8.7–10.5)
CHLORIDE SERPL-SCNC: 103 MMOL/L (ref 95–110)
CHOLEST SERPL-MCNC: 169 MG/DL (ref 120–199)
CHOLEST/HDLC SERPL: 3.6 {RATIO} (ref 2–5)
CO2 SERPL-SCNC: 26 MMOL/L (ref 23–29)
COMPLEXED PSA SERPL-MCNC: 0.72 NG/ML (ref 0–4)
CREAT SERPL-MCNC: 1.8 MG/DL (ref 0.5–1.4)
ERYTHROCYTE [DISTWIDTH] IN BLOOD BY AUTOMATED COUNT: 13.4 % (ref 11.5–14.5)
EST. GFR  (NO RACE VARIABLE): 45 ML/MIN/1.73 M^2
ESTIMATED AVG GLUCOSE: 114 MG/DL (ref 68–131)
GLUCOSE SERPL-MCNC: 99 MG/DL (ref 70–110)
HBA1C MFR BLD: 5.6 % (ref 4–5.6)
HCT VFR BLD AUTO: 45 % (ref 40–54)
HDLC SERPL-MCNC: 47 MG/DL (ref 40–75)
HDLC SERPL: 27.8 % (ref 20–50)
HGB BLD-MCNC: 15.2 G/DL (ref 14–18)
LDLC SERPL CALC-MCNC: 100.8 MG/DL (ref 63–159)
MCH RBC QN AUTO: 30.1 PG (ref 27–31)
MCHC RBC AUTO-ENTMCNC: 33.8 G/DL (ref 32–36)
MCV RBC AUTO: 89 FL (ref 82–98)
NONHDLC SERPL-MCNC: 122 MG/DL
PLATELET # BLD AUTO: 300 K/UL (ref 150–450)
PMV BLD AUTO: 9.8 FL (ref 9.2–12.9)
POTASSIUM SERPL-SCNC: 4.3 MMOL/L (ref 3.5–5.1)
PROT SERPL-MCNC: 7.7 G/DL (ref 6–8.4)
RBC # BLD AUTO: 5.05 M/UL (ref 4.6–6.2)
SODIUM SERPL-SCNC: 141 MMOL/L (ref 136–145)
TESTOST SERPL-MCNC: 355 NG/DL (ref 304–1227)
TRIGL SERPL-MCNC: 106 MG/DL (ref 30–150)
TSH SERPL DL<=0.005 MIU/L-ACNC: 1.06 UIU/ML (ref 0.4–4)
WBC # BLD AUTO: 5.83 K/UL (ref 3.9–12.7)

## 2022-08-23 PROCEDURE — 83036 HEMOGLOBIN GLYCOSYLATED A1C: CPT | Performed by: FAMILY MEDICINE

## 2022-08-23 PROCEDURE — 82306 VITAMIN D 25 HYDROXY: CPT | Performed by: FAMILY MEDICINE

## 2022-08-23 PROCEDURE — 80053 COMPREHEN METABOLIC PANEL: CPT | Mod: PO | Performed by: FAMILY MEDICINE

## 2022-08-23 PROCEDURE — 84153 ASSAY OF PSA TOTAL: CPT | Performed by: FAMILY MEDICINE

## 2022-08-23 PROCEDURE — 80061 LIPID PANEL: CPT | Performed by: FAMILY MEDICINE

## 2022-08-23 PROCEDURE — 84443 ASSAY THYROID STIM HORMONE: CPT | Performed by: FAMILY MEDICINE

## 2022-08-23 PROCEDURE — 84403 ASSAY OF TOTAL TESTOSTERONE: CPT | Performed by: FAMILY MEDICINE

## 2022-08-23 PROCEDURE — 85027 COMPLETE CBC AUTOMATED: CPT | Mod: PO | Performed by: FAMILY MEDICINE

## 2022-08-25 ENCOUNTER — OFFICE VISIT (OUTPATIENT)
Dept: FAMILY MEDICINE | Facility: CLINIC | Age: 52
End: 2022-08-25
Payer: COMMERCIAL

## 2022-08-25 VITALS
OXYGEN SATURATION: 97 % | HEIGHT: 73 IN | WEIGHT: 195.75 LBS | HEART RATE: 63 BPM | BODY MASS INDEX: 25.94 KG/M2 | DIASTOLIC BLOOD PRESSURE: 88 MMHG | SYSTOLIC BLOOD PRESSURE: 122 MMHG

## 2022-08-25 DIAGNOSIS — R40.0 DAYTIME SOMNOLENCE: ICD-10-CM

## 2022-08-25 DIAGNOSIS — R79.89 LOW TESTOSTERONE: ICD-10-CM

## 2022-08-25 DIAGNOSIS — R06.83 SNORING: ICD-10-CM

## 2022-08-25 DIAGNOSIS — Z00.00 WELLNESS EXAMINATION: Primary | ICD-10-CM

## 2022-08-25 PROCEDURE — 3044F PR MOST RECENT HEMOGLOBIN A1C LEVEL <7.0%: ICD-10-PCS | Mod: CPTII,S$GLB,, | Performed by: FAMILY MEDICINE

## 2022-08-25 PROCEDURE — 3044F HG A1C LEVEL LT 7.0%: CPT | Mod: CPTII,S$GLB,, | Performed by: FAMILY MEDICINE

## 2022-08-25 PROCEDURE — 3079F DIAST BP 80-89 MM HG: CPT | Mod: CPTII,S$GLB,, | Performed by: FAMILY MEDICINE

## 2022-08-25 PROCEDURE — 3008F BODY MASS INDEX DOCD: CPT | Mod: CPTII,S$GLB,, | Performed by: FAMILY MEDICINE

## 2022-08-25 PROCEDURE — 99999 PR PBB SHADOW E&M-EST. PATIENT-LVL III: CPT | Mod: PBBFAC,,, | Performed by: FAMILY MEDICINE

## 2022-08-25 PROCEDURE — 1159F MED LIST DOCD IN RCRD: CPT | Mod: CPTII,S$GLB,, | Performed by: FAMILY MEDICINE

## 2022-08-25 PROCEDURE — 3079F PR MOST RECENT DIASTOLIC BLOOD PRESSURE 80-89 MM HG: ICD-10-PCS | Mod: CPTII,S$GLB,, | Performed by: FAMILY MEDICINE

## 2022-08-25 PROCEDURE — 99396 PR PREVENTIVE VISIT,EST,40-64: ICD-10-PCS | Mod: S$GLB,,, | Performed by: FAMILY MEDICINE

## 2022-08-25 PROCEDURE — 99999 PR PBB SHADOW E&M-EST. PATIENT-LVL III: ICD-10-PCS | Mod: PBBFAC,,, | Performed by: FAMILY MEDICINE

## 2022-08-25 PROCEDURE — 3074F SYST BP LT 130 MM HG: CPT | Mod: CPTII,S$GLB,, | Performed by: FAMILY MEDICINE

## 2022-08-25 PROCEDURE — 1159F PR MEDICATION LIST DOCUMENTED IN MEDICAL RECORD: ICD-10-PCS | Mod: CPTII,S$GLB,, | Performed by: FAMILY MEDICINE

## 2022-08-25 PROCEDURE — 99396 PREV VISIT EST AGE 40-64: CPT | Mod: S$GLB,,, | Performed by: FAMILY MEDICINE

## 2022-08-25 PROCEDURE — 3008F PR BODY MASS INDEX (BMI) DOCUMENTED: ICD-10-PCS | Mod: CPTII,S$GLB,, | Performed by: FAMILY MEDICINE

## 2022-08-25 PROCEDURE — 3074F PR MOST RECENT SYSTOLIC BLOOD PRESSURE < 130 MM HG: ICD-10-PCS | Mod: CPTII,S$GLB,, | Performed by: FAMILY MEDICINE

## 2022-08-25 NOTE — PROGRESS NOTES
Subjective:       Patient ID: Valentin Johnson is a 51 y.o. male.    Chief Complaint: Annual Exam    Here for wellness and f/u chronic issues. Doing well overall. More fatigue lately.    Review of Systems   Constitutional: Negative for chills and fever.   Respiratory: Negative for cough, chest tightness and shortness of breath.    Cardiovascular: Negative for chest pain, palpitations and leg swelling.   Endocrine: Negative for cold intolerance and heat intolerance.   Psychiatric/Behavioral: Negative for decreased concentration. The patient is not nervous/anxious.        Objective:      Physical Exam  Vitals and nursing note reviewed.   Constitutional:       Appearance: He is well-developed.   HENT:      Head: Normocephalic and atraumatic.   Cardiovascular:      Rate and Rhythm: Normal rate and regular rhythm.      Heart sounds: Normal heart sounds.   Pulmonary:      Effort: Pulmonary effort is normal.      Breath sounds: Normal breath sounds.         Assessment:       1. Wellness examination    2. Snoring    3. Daytime somnolence    4. Low testosterone        Plan:       Wellness examination    Snoring  -     Ambulatory referral/consult to Sleep Disorders; Future; Expected date: 09/01/2022  -     Polysomnogram (CPAP will be added if patient meets diagnostic criteria.); Future    Daytime somnolence  -     Ambulatory referral/consult to Sleep Disorders; Future; Expected date: 09/01/2022  -     Polysomnogram (CPAP will be added if patient meets diagnostic criteria.); Future    Low testosterone  -     Testosterone; Future; Expected date: 08/25/2022      Reviewed labs  Home bp check  Hx of possible ruth ann    Follow up in about 1 year (around 8/25/2023), or if symptoms worsen or fail to improve.

## 2022-10-04 ENCOUNTER — PATIENT MESSAGE (OUTPATIENT)
Dept: FAMILY MEDICINE | Facility: CLINIC | Age: 52
End: 2022-10-04
Payer: COMMERCIAL

## 2022-10-20 ENCOUNTER — TELEPHONE (OUTPATIENT)
Dept: FAMILY MEDICINE | Facility: CLINIC | Age: 52
End: 2022-10-20
Payer: COMMERCIAL

## 2022-10-20 DIAGNOSIS — G47.19 EXCESSIVE DAYTIME SLEEPINESS: Primary | ICD-10-CM

## 2022-12-02 ENCOUNTER — LAB VISIT (OUTPATIENT)
Dept: LAB | Facility: HOSPITAL | Age: 52
End: 2022-12-02
Attending: FAMILY MEDICINE
Payer: COMMERCIAL

## 2022-12-02 DIAGNOSIS — R79.89 LOW TESTOSTERONE: ICD-10-CM

## 2022-12-02 LAB — TESTOST SERPL-MCNC: 368 NG/DL (ref 304–1227)

## 2022-12-02 PROCEDURE — 36415 COLL VENOUS BLD VENIPUNCTURE: CPT | Mod: PO | Performed by: FAMILY MEDICINE

## 2022-12-02 PROCEDURE — 84403 ASSAY OF TOTAL TESTOSTERONE: CPT | Performed by: FAMILY MEDICINE

## 2023-02-24 ENCOUNTER — OFFICE VISIT (OUTPATIENT)
Dept: DERMATOLOGY | Facility: CLINIC | Age: 53
End: 2023-02-24
Payer: COMMERCIAL

## 2023-02-24 VITALS — WEIGHT: 195 LBS | HEIGHT: 73 IN | BODY MASS INDEX: 25.84 KG/M2

## 2023-02-24 DIAGNOSIS — B07.8 COMMON WART: Primary | ICD-10-CM

## 2023-02-24 DIAGNOSIS — Z12.83 SKIN CANCER SCREENING: ICD-10-CM

## 2023-02-24 DIAGNOSIS — D22.9 MULTIPLE BENIGN NEVI: ICD-10-CM

## 2023-02-24 DIAGNOSIS — L81.4 SOLAR LENTIGO: ICD-10-CM

## 2023-02-24 DIAGNOSIS — L57.0 ACTINIC KERATOSES: ICD-10-CM

## 2023-02-24 DIAGNOSIS — L82.1 SEBORRHEIC KERATOSES: ICD-10-CM

## 2023-02-24 DIAGNOSIS — L73.8 SEBACEOUS HYPERPLASIA OF FACE: ICD-10-CM

## 2023-02-24 DIAGNOSIS — L30.4 INTERTRIGO: ICD-10-CM

## 2023-02-24 PROCEDURE — 17000 DESTRUCT PREMALG LESION: CPT | Mod: ,,, | Performed by: DERMATOLOGY

## 2023-02-24 PROCEDURE — 99203 PR OFFICE/OUTPT VISIT, NEW, LEVL III, 30-44 MIN: ICD-10-PCS | Mod: 25,,, | Performed by: DERMATOLOGY

## 2023-02-24 PROCEDURE — 99203 OFFICE O/P NEW LOW 30 MIN: CPT | Mod: 25,,, | Performed by: DERMATOLOGY

## 2023-02-24 PROCEDURE — 17003 DESTRUCTION, PREMALIGNANT LESIONS; SECOND THROUGH 14 LESIONS: ICD-10-PCS | Mod: ,,, | Performed by: DERMATOLOGY

## 2023-02-24 PROCEDURE — 3008F PR BODY MASS INDEX (BMI) DOCUMENTED: ICD-10-PCS | Mod: CPTII,,, | Performed by: DERMATOLOGY

## 2023-02-24 PROCEDURE — 1159F MED LIST DOCD IN RCRD: CPT | Mod: CPTII,,, | Performed by: DERMATOLOGY

## 2023-02-24 PROCEDURE — 99999 PR PBB SHADOW E&M-EST. PATIENT-LVL III: ICD-10-PCS | Mod: PBBFAC,,, | Performed by: DERMATOLOGY

## 2023-02-24 PROCEDURE — 17110 PR DESTRUCTION BENIGN LESIONS UP TO 14: ICD-10-PCS | Mod: CSM,S$GLB,, | Performed by: DERMATOLOGY

## 2023-02-24 PROCEDURE — 17110 DESTRUCTION B9 LES UP TO 14: CPT | Mod: CSM,S$GLB,, | Performed by: DERMATOLOGY

## 2023-02-24 PROCEDURE — 1159F PR MEDICATION LIST DOCUMENTED IN MEDICAL RECORD: ICD-10-PCS | Mod: CPTII,,, | Performed by: DERMATOLOGY

## 2023-02-24 PROCEDURE — 17003 DESTRUCT PREMALG LES 2-14: CPT | Mod: ,,, | Performed by: DERMATOLOGY

## 2023-02-24 PROCEDURE — 1160F RVW MEDS BY RX/DR IN RCRD: CPT | Mod: CPTII,,, | Performed by: DERMATOLOGY

## 2023-02-24 PROCEDURE — 99999 PR PBB SHADOW E&M-EST. PATIENT-LVL III: CPT | Mod: PBBFAC,,, | Performed by: DERMATOLOGY

## 2023-02-24 PROCEDURE — 3008F BODY MASS INDEX DOCD: CPT | Mod: CPTII,,, | Performed by: DERMATOLOGY

## 2023-02-24 PROCEDURE — 17000 PR DESTRUCTION(LASER SURGERY,CRYOSURGERY,CHEMOSURGERY),PREMALIGNANT LESIONS,FIRST LESION: ICD-10-PCS | Mod: ,,, | Performed by: DERMATOLOGY

## 2023-02-24 PROCEDURE — 1160F PR REVIEW ALL MEDS BY PRESCRIBER/CLIN PHARMACIST DOCUMENTED: ICD-10-PCS | Mod: CPTII,,, | Performed by: DERMATOLOGY

## 2023-02-24 RX ORDER — KETOCONAZOLE 20 MG/G
CREAM TOPICAL
Qty: 60 G | Refills: 3 | Status: SHIPPED | OUTPATIENT
Start: 2023-02-24

## 2023-02-24 NOTE — PROGRESS NOTES
Subjective:       Patient ID:  Valentin Johnson is a 52 y.o. male who presents for   Chief Complaint   Patient presents with    Skin Check     tbsc    Spot     Face, arms legs       New patient    Here today for a TBSC  C/o spots on arms and face  Intense sun exposure in the past, highschool sports fields    Has no hx of NMSC  Has no fhx of MM    Current Outpatient Medications:   ·  ascorbic acid, vitamin C, (VITAMIN C) 100 MG tablet, Take 500 mg by mouth once daily. , Disp: , Rfl:   ·  multivitamin (THERAGRAN) per tablet, Take 1 tablet by mouth once daily., Disp: , Rfl:   ·  vitamin D (VITAMIN D3) 1000 units Tab, Take 1,000 Units by mouth once daily., Disp: , Rfl:   ·  zinc 50 mg Tab, Take 1 tablet by mouth once daily. , Disp: , Rfl:   ·  tamsulosin (FLOMAX) 0.4 mg Cap, Take 1 capsule (0.4 mg total) by mouth every evening., Disp: 30 capsule, Rfl: 11      Review of Systems   Constitutional:  Negative for fever, chills and fatigue.   Skin:  Positive for activity-related sunscreen use and wears hat. Negative for itching, rash, dry skin and daily sunscreen use.   Hematologic/Lymphatic: Does not bruise/bleed easily.      Objective:    Physical Exam   Constitutional: He appears well-developed and well-nourished. No distress.   Neurological: He is alert and oriented to person, place, and time. He is not disoriented.   Psychiatric: He has a normal mood and affect.   Skin:   Areas Examined (abnormalities noted in diagram):   Scalp / Hair Palpated and Inspected  Head / Face Inspection Performed  Neck Inspection Performed  Chest / Axilla Inspection Performed  Abdomen Inspection Performed  Genitals / Buttocks / Groin Inspection Performed  Back Inspection Performed  RUE Inspected  LUE Inspection Performed  RLE Inspected  LLE Inspection Performed  Nails and Digits Inspection Performed                     Diagram Legend     Erythematous scaling macule/papule c/w actinic keratosis       Vascular papule c/w angioma       Pigmented verrucoid papule/plaque c/w seborrheic keratosis      Yellow umbilicated papule c/w sebaceous hyperplasia      Irregularly shaped tan macule c/w lentigo     1-2 mm smooth white papules consistent with Milia      Movable subcutaneous cyst with punctum c/w epidermal inclusion cyst      Subcutaneous movable cyst c/w pilar cyst      Firm pink to brown papule c/w dermatofibroma      Pedunculated fleshy papule(s) c/w skin tag(s)      Evenly pigmented macule c/w junctional nevus     Mildly variegated pigmented, slightly irregular-bordered macule c/w mildly atypical nevus      Flesh colored to evenly pigmented papule c/w intradermal nevus       Pink pearly papule/plaque c/w basal cell carcinoma      Erythematous hyperkeratotic cursted plaque c/w SCC      Surgical scar with no sign of skin cancer recurrence      Open and closed comedones      Inflammatory papules and pustules      Verrucoid papule consistent consistent with wart     Erythematous eczematous patches and plaques     Dystrophic onycholytic nail with subungual debris c/w onychomycosis     Umbilicated papule    Erythematous-base heme-crusted tan verrucoid plaque consistent with inflamed seborrheic keratosis     Erythematous Silvery Scaling Plaque c/w Psoriasis     See annotation      Assessment / Plan:      AK  Cryosurgery Procedure Note    Verbal consent from the patient is obtained and the patient is aware of the precancerous quality and need for treatment of these lesions. Liquid nitrogen cryosurgery is applied to the 2 actinic keratoses, as detailed in the physical exam, to produce a freeze injury. The patient is aware that blisters may form and is instructed on wound care with gentle cleansing and use of vaseline ointment to keep moist until healed. The patient is supplied a handout on cryosurgery and is instructed to call if lesions do not completely resolve.    Common wart  Cryosurgery procedure note:    Verbal consent from the patient is  obtained. Liquid nitrogen cryosurgery is applied to 2 verruca with prior paring, as detailed in the physical exam, to produce a freeze injury. 3 consecutive freeze thaw cycles are applied to each verruca. The patient is aware that blisters (possibly blood blisters) may form.    Seborrheic keratoses  These are benign inherited growths without a malignant potential. Reassurance given to patient. No treatment is necessary.     Solar lentigo  This is a benign hyperpigmented sun induced lesion. Daily sun protection will reduce the number of new lesions. Treatment of these benign lesions are considered cosmetic.    Multiple benign nevi  Careful dermoscopy evaluation of nevi performed with none identified as needing biopsy today  Monitor for new mole or moles that are becoming bigger, darker, irritated, or developing irregular borders.     Sebaceous hyperplasia of face  This is a common condition representing benign enlargement of the sebaceous lobule. It typically occurs in adulthood. Reassurance given to patient.     Skin cancer screening  Total body skin examination performed today including at least 12 points as noted in physical examination. No lesions suspicious for malignancy noted.    Intertrigo  Keto cream   Tried lotrisone with success - potent steroid part not ideal for folds chronic use  Drying measures    Patient instructed in importance in daily broad spectrum sun protection of at least spf 30. Mineral sunscreen ingredients preferred (Zinc +/- Titanium) and can be found OTC.   Recommend Elta MD for daily use on face and neck.  Patient encouraged to wear hat for all outdoor exposure.   Also discussed sun avoidance and use of protective clothing.             No follow-ups on file.

## 2023-02-24 NOTE — PATIENT INSTRUCTIONS

## 2023-03-15 ENCOUNTER — OFFICE VISIT (OUTPATIENT)
Dept: OPTOMETRY | Facility: CLINIC | Age: 53
End: 2023-03-15
Payer: COMMERCIAL

## 2023-03-15 DIAGNOSIS — H52.7 REFRACTIVE ERROR: ICD-10-CM

## 2023-03-15 DIAGNOSIS — Z01.00 EXAMINATION OF EYES AND VISION: Primary | ICD-10-CM

## 2023-03-15 DIAGNOSIS — H04.123 DRY EYE SYNDROME, BILATERAL: ICD-10-CM

## 2023-03-15 PROCEDURE — 92014 COMPRE OPH EXAM EST PT 1/>: CPT | Mod: S$GLB,,, | Performed by: OPTOMETRIST

## 2023-03-15 PROCEDURE — 92015 DETERMINE REFRACTIVE STATE: CPT | Mod: S$GLB,,, | Performed by: OPTOMETRIST

## 2023-03-15 PROCEDURE — 99999 PR PBB SHADOW E&M-EST. PATIENT-LVL II: CPT | Mod: PBBFAC,,, | Performed by: OPTOMETRIST

## 2023-03-15 PROCEDURE — 99999 PR PBB SHADOW E&M-EST. PATIENT-LVL II: ICD-10-PCS | Mod: PBBFAC,,, | Performed by: OPTOMETRIST

## 2023-03-15 PROCEDURE — 92014 PR EYE EXAM, EST PATIENT,COMPREHESV: ICD-10-PCS | Mod: S$GLB,,, | Performed by: OPTOMETRIST

## 2023-03-15 PROCEDURE — 92015 PR REFRACTION: ICD-10-PCS | Mod: S$GLB,,, | Performed by: OPTOMETRIST

## 2023-03-15 NOTE — PROGRESS NOTES
HPI    Pt here for yearly routine exam. Pt states vision is decreasing at both   distance and near, having to wear specs more often.    Pt denies headaches, eye pain.     Pt states tried vuity and did not notice much help so stopped using after   a few days and has not used since.     Pt did not bring specs.  Last edited by Brenton Alejo, OD on 3/15/2023  8:34 AM.            Assessment /Plan     For exam results, see Encounter Report.    Examination of eyes and vision    Refractive error    Dry eye syndrome, bilateral      1. Examination of eyes and vision    2. Refractive error  Dispensed updated spectacle Rx. Discussed various spectacle lens options. Discussed adaptation period to new specs.     3. Dry eye syndrome, bilateral  Discussed ocular affects of dry eyes. Recommend OTC artificial tears 2-4 times a day in both eyes. Discussed chronicity of RHIANNON. RTC if symptoms not alleviated by continued use of artificial tears.

## 2023-03-31 ENCOUNTER — PATIENT MESSAGE (OUTPATIENT)
Dept: OPTOMETRY | Facility: CLINIC | Age: 53
End: 2023-03-31
Payer: COMMERCIAL

## 2024-07-09 ENCOUNTER — PATIENT MESSAGE (OUTPATIENT)
Dept: ADMINISTRATIVE | Facility: HOSPITAL | Age: 54
End: 2024-07-09
Payer: COMMERCIAL

## 2025-05-23 ENCOUNTER — TELEPHONE (OUTPATIENT)
Dept: FAMILY MEDICINE | Facility: CLINIC | Age: 55
End: 2025-05-23
Payer: COMMERCIAL

## (undated) DEVICE — GLOVE BIOGEL MICRO SURGEON PINK SZ 7

## (undated) DEVICE — EVACUATOR WOUND BULB 100CC

## (undated) DEVICE — COVER TIP CURVED SCISSORS XI

## (undated) DEVICE — PACK CUSTOM UNIV BASIN SLI

## (undated) DEVICE — SPONGE DERMACEA 4X4IN 12PLY

## (undated) DEVICE — TRAY SKIN PREP DRY

## (undated) DEVICE — SUT PROLENE 5-0 24 C-1 BL

## (undated) DEVICE — TROCAR ENDO Z THRD KII 12X150

## (undated) DEVICE — SUT VICRYL 4-0 RB1 27IN UD

## (undated) DEVICE — SPONGE GAUZE 16PLY 4X4

## (undated) DEVICE — TUBE NG W/ANTI-REFLUX FILTER 1

## (undated) DEVICE — GLOVE SURG ULTRA TOUCH 7

## (undated) DEVICE — GUIDE BIOPSY BIPLANAR 18G

## (undated) DEVICE — KIT CATH SURESTEP 350ML URIN18

## (undated) DEVICE — NDL HYPODERMIC BLUNT 18G 1.5IN

## (undated) DEVICE — IRRIGATOR SUCTION W/TIP

## (undated) DEVICE — LINER SUCTION 3000CC

## (undated) DEVICE — COVER TRANSDUCER LATEX N/STERI

## (undated) DEVICE — DRESSING TRANS 4X4 TEGADERM

## (undated) DEVICE — BAG INZII TISS RETRV 12/15MM

## (undated) DEVICE — CLIPPER BLADE MOD 4406 (CAREF)

## (undated) DEVICE — STRAP OR TABLE 5IN X 72IN

## (undated) DEVICE — LUBRICANT SURGILUBE 2 OZ

## (undated) DEVICE — TIP BOVIE 6.5 0014

## (undated) DEVICE — HEMOSTAT SURGICEL 4X8

## (undated) DEVICE — SYR 10CC LUER LOCK

## (undated) DEVICE — UNDERGLOVE BIOGEL PI MICRO BLUE SZ 7

## (undated) DEVICE — ELECTRODE REM PLYHSV RETURN 9

## (undated) DEVICE — CORD BIPOLAR 12 FOOT

## (undated) DEVICE — SYR DISP LL 5CC

## (undated) DEVICE — SUT 0 VICRYL / CT-1

## (undated) DEVICE — SUT 2-0 ETHILON 18 FS

## (undated) DEVICE — GLOVE BIOGEL MICRO SURGEON PINK SZ 7.5

## (undated) DEVICE — SPONGE LAP 4X18 PREWASHED

## (undated) DEVICE — SUCTION YANKAUER 34970

## (undated) DEVICE — SUTURE VICRYL 3-0 SH 27 VCP416H

## (undated) DEVICE — SEE MEDLINE ITEM 152622

## (undated) DEVICE — SUT PDS II 0 CT-1 VIL MONO

## (undated) DEVICE — BRIEF MESH XLARGE GREEN BULK MB5100

## (undated) DEVICE — SUT ABS CLIP LAPRA-TY CTD

## (undated) DEVICE — ADHESIVE DERMABOND ADVANCED

## (undated) DEVICE — SOLUTION PREP IODINE 4OZ

## (undated) DEVICE — ENDOAPTH VAS RELOAD WHITE 2.5

## (undated) DEVICE — UNDERGLOVE BIOGEL PI SZ 6.5 LF

## (undated) DEVICE — WATER STERILE INJ 500ML BAG

## (undated) DEVICE — PAD BOVIE ADULT

## (undated) DEVICE — SEE MEDLINE ITEM 157116

## (undated) DEVICE — DRAPE ABDOMINAL TIBURON 14X11

## (undated) DEVICE — GLOVE SURGEONS ULTRA TOUCH 6.5

## (undated) DEVICE — SET TUBE PNEUMOCLEAR SE HI FLO

## (undated) DEVICE — NDL SAFETY 21G X 1 1/2 ECLPSE

## (undated) DEVICE — TROCAR ENDOPATH XCEL 5X100MM

## (undated) DEVICE — APPLICATOR CHLORAPREP ORN 26ML

## (undated) DEVICE — SEE MEDLINE ITEM 157117

## (undated) DEVICE — ECHELON FLEX POWERED VAS STPLR

## (undated) DEVICE — SET CYSTO IRRIGATION UNIV SPIK

## (undated) DEVICE — BLADE SURG CARBON STEEL SZ11

## (undated) DEVICE — GLOVE SURG ULTRA TOUCH 7.5

## (undated) DEVICE — PAD ABD 5X9   7196D

## (undated) DEVICE — SEE MEDLINE ITEM 146292

## (undated) DEVICE — TOWEL OR XRAY WHITE 17X26IN

## (undated) DEVICE — SPONGE GAUZE 10S 4X4  442214

## (undated) DEVICE — UNDERGLOVES BIOGEL PI SZ 7 LF

## (undated) DEVICE — RETAINER VISCERA STER DISP LRG

## (undated) DEVICE — SCRUB 10% POVIDONE IODINE 4OZ

## (undated) DEVICE — DRAPE LAPAROTOMY 72X100X124 89228

## (undated) DEVICE — SPONGE IV DRAIN 4X4 STERILE

## (undated) DEVICE — SUT MONOCRYL 4-0 PS-2

## (undated) DEVICE — NDL PNEUMO INSUFFLATI 120MM

## (undated) DEVICE — NDL SPINAL SPINOCAN 22GX3.5

## (undated) DEVICE — TUBING MINIBORE EXTENSION

## (undated) DEVICE — SUT CTD VICRYL VIL BR SH 27

## (undated) DEVICE — NEEDLE SAFETY ECLIPSE 25G 1-1/2IN 305767

## (undated) DEVICE — APPLICATOR CHLORAPREP CLR 10.5

## (undated) DEVICE — SYS CLSR WND ENDOSCP XL

## (undated) DEVICE — DRAIN SIL FLT 7MM FULL PERF ST

## (undated) DEVICE — KIT ROBOTIC 3 ARM DA VINCI SI

## (undated) DEVICE — SUT 0 36IN COATED VICRYL VI

## (undated) DEVICE — SLEEVE SCD EXPRESS CALF MEDIUM

## (undated) DEVICE — TRAY GENERAL SURGERY

## (undated) DEVICE — ADHESIVE MASTISOL VIAL 0523-48

## (undated) DEVICE — TROCAR ENDOPATH XCEL 12X100MM

## (undated) DEVICE — SOL ELECTROLUBE ANTI-STIC

## (undated) DEVICE — SCISSOR 5MMX35CM DIRECT DRIVE

## (undated) DEVICE — SOL IRR NACL .9% 3000ML

## (undated) DEVICE — CLIP HEMO-LOK MLX LARGE LF

## (undated) DEVICE — SOLUTION SCRUB IODINE 4OZ

## (undated) DEVICE — NDL SAFETY 25G X 1.5 ECLIPSE

## (undated) DEVICE — HEMOSTAT SURGICEL 4X8IN

## (undated) DEVICE — CLIP HEMO-LOK ML

## (undated) DEVICE — TAPE SILK 3IN

## (undated) DEVICE — SYR ONLY LUER LOCK 20CC

## (undated) DEVICE — DRESSING ABSRBNT ISLAND 3.6X8

## (undated) DEVICE — PACK BASIC

## (undated) DEVICE — SEALER LIGASURE LAP 37CM 5MM

## (undated) DEVICE — SOL CLEARIFY VISUALIZATION LAP

## (undated) DEVICE — SOL 9P NACL IRR PIC IL

## (undated) DEVICE — DRAPE UINDERBUT GRAD PCH

## (undated) DEVICE — SYS LABEL CORRECT MED

## (undated) DEVICE — SYRINGE HYPODERMC LL 22G 1.5 ECLIPSE 30